# Patient Record
Sex: FEMALE | Race: WHITE | NOT HISPANIC OR LATINO | Employment: FULL TIME | URBAN - METROPOLITAN AREA
[De-identification: names, ages, dates, MRNs, and addresses within clinical notes are randomized per-mention and may not be internally consistent; named-entity substitution may affect disease eponyms.]

---

## 2020-11-11 ENCOUNTER — OFFICE VISIT (OUTPATIENT)
Dept: URGENT CARE | Facility: URGENT CARE | Age: 45
End: 2020-11-11
Payer: COMMERCIAL

## 2020-11-11 ENCOUNTER — ANCILLARY PROCEDURE (OUTPATIENT)
Dept: GENERAL RADIOLOGY | Facility: CLINIC | Age: 45
End: 2020-11-11
Attending: FAMILY MEDICINE
Payer: COMMERCIAL

## 2020-11-11 VITALS
TEMPERATURE: 100.8 F | SYSTOLIC BLOOD PRESSURE: 136 MMHG | HEART RATE: 121 BPM | DIASTOLIC BLOOD PRESSURE: 97 MMHG | OXYGEN SATURATION: 99 %

## 2020-11-11 DIAGNOSIS — Z20.822 SUSPECTED COVID-19 VIRUS INFECTION: ICD-10-CM

## 2020-11-11 DIAGNOSIS — R05.9 COUGH: ICD-10-CM

## 2020-11-11 DIAGNOSIS — R07.9 CHEST PAIN, UNSPECIFIED TYPE: ICD-10-CM

## 2020-11-11 DIAGNOSIS — R06.02 SOB (SHORTNESS OF BREATH): Primary | ICD-10-CM

## 2020-11-11 LAB
ALBUMIN SERPL-MCNC: 4 G/DL (ref 3.4–5)
ALP SERPL-CCNC: 68 U/L (ref 40–150)
ALT SERPL W P-5'-P-CCNC: 42 U/L (ref 0–50)
ANION GAP SERPL CALCULATED.3IONS-SCNC: 5 MMOL/L (ref 3–14)
AST SERPL W P-5'-P-CCNC: 94 U/L (ref 0–45)
BASOPHILS # BLD AUTO: 0 10E9/L (ref 0–0.2)
BASOPHILS NFR BLD AUTO: 0.3 %
BILIRUB SERPL-MCNC: 1 MG/DL (ref 0.2–1.3)
BUN SERPL-MCNC: 9 MG/DL (ref 7–30)
CALCIUM SERPL-MCNC: 8.8 MG/DL (ref 8.5–10.1)
CHLORIDE SERPL-SCNC: 103 MMOL/L (ref 94–109)
CO2 SERPL-SCNC: 26 MMOL/L (ref 20–32)
CREAT SERPL-MCNC: 0.8 MG/DL (ref 0.52–1.04)
DIFFERENTIAL METHOD BLD: ABNORMAL
EOSINOPHIL # BLD AUTO: 0.2 10E9/L (ref 0–0.7)
EOSINOPHIL NFR BLD AUTO: 1.6 %
ERYTHROCYTE [DISTWIDTH] IN BLOOD BY AUTOMATED COUNT: 13.2 % (ref 10–15)
ERYTHROCYTE [SEDIMENTATION RATE] IN BLOOD BY WESTERGREN METHOD: 5 MM/H (ref 0–20)
GFR SERPL CREATININE-BSD FRML MDRD: 89 ML/MIN/{1.73_M2}
GLUCOSE SERPL-MCNC: 97 MG/DL (ref 70–99)
HCT VFR BLD AUTO: 41.7 % (ref 35–47)
HGB BLD-MCNC: 14.4 G/DL (ref 11.7–15.7)
LYMPHOCYTES # BLD AUTO: 2.5 10E9/L (ref 0.8–5.3)
LYMPHOCYTES NFR BLD AUTO: 21.1 %
MCH RBC QN AUTO: 34.4 PG (ref 26.5–33)
MCHC RBC AUTO-ENTMCNC: 34.5 G/DL (ref 31.5–36.5)
MCV RBC AUTO: 100 FL (ref 78–100)
MONOCYTES # BLD AUTO: 1 10E9/L (ref 0–1.3)
MONOCYTES NFR BLD AUTO: 8.5 %
NEUTROPHILS # BLD AUTO: 8 10E9/L (ref 1.6–8.3)
NEUTROPHILS NFR BLD AUTO: 68.5 %
PLATELET # BLD AUTO: 203 10E9/L (ref 150–450)
POTASSIUM SERPL-SCNC: 4 MMOL/L (ref 3.4–5.3)
PROT SERPL-MCNC: 6.8 G/DL (ref 6.8–8.8)
RBC # BLD AUTO: 4.18 10E12/L (ref 3.8–5.2)
SODIUM SERPL-SCNC: 134 MMOL/L (ref 133–144)
WBC # BLD AUTO: 11.6 10E9/L (ref 4–11)

## 2020-11-11 PROCEDURE — 71046 X-RAY EXAM CHEST 2 VIEWS: CPT | Performed by: RADIOLOGY

## 2020-11-11 PROCEDURE — 85025 COMPLETE CBC W/AUTO DIFF WBC: CPT | Performed by: FAMILY MEDICINE

## 2020-11-11 PROCEDURE — U0003 INFECTIOUS AGENT DETECTION BY NUCLEIC ACID (DNA OR RNA); SEVERE ACUTE RESPIRATORY SYNDROME CORONAVIRUS 2 (SARS-COV-2) (CORONAVIRUS DISEASE [COVID-19]), AMPLIFIED PROBE TECHNIQUE, MAKING USE OF HIGH THROUGHPUT TECHNOLOGIES AS DESCRIBED BY CMS-2020-01-R: HCPCS | Performed by: FAMILY MEDICINE

## 2020-11-11 PROCEDURE — 93000 ELECTROCARDIOGRAM COMPLETE: CPT | Performed by: FAMILY MEDICINE

## 2020-11-11 PROCEDURE — 36415 COLL VENOUS BLD VENIPUNCTURE: CPT | Performed by: FAMILY MEDICINE

## 2020-11-11 PROCEDURE — 80053 COMPREHEN METABOLIC PANEL: CPT | Performed by: FAMILY MEDICINE

## 2020-11-11 PROCEDURE — 99203 OFFICE O/P NEW LOW 30 MIN: CPT | Performed by: FAMILY MEDICINE

## 2020-11-11 PROCEDURE — 85652 RBC SED RATE AUTOMATED: CPT | Performed by: FAMILY MEDICINE

## 2020-11-11 RX ORDER — ALPRAZOLAM 0.5 MG
0.5 TABLET ORAL 3 TIMES DAILY PRN
COMMUNITY
End: 2022-10-20

## 2020-11-11 RX ORDER — BENZONATATE 200 MG/1
200 CAPSULE ORAL 3 TIMES DAILY PRN
Qty: 30 CAPSULE | Refills: 0 | Status: SHIPPED | OUTPATIENT
Start: 2020-11-11 | End: 2022-08-15

## 2020-11-11 RX ORDER — ALBUTEROL SULFATE 90 UG/1
2 AEROSOL, METERED RESPIRATORY (INHALATION) EVERY 6 HOURS
Qty: 1 INHALER | Refills: 0 | Status: SHIPPED | OUTPATIENT
Start: 2020-11-11 | End: 2022-08-15

## 2020-11-11 RX ORDER — TOPIRAMATE SPINKLE 25 MG/1
25 CAPSULE ORAL 2 TIMES DAILY
COMMUNITY
End: 2022-08-15

## 2020-11-11 RX ORDER — BUPROPION HYDROCHLORIDE 75 MG/1
75 TABLET ORAL 2 TIMES DAILY
COMMUNITY
End: 2022-08-15

## 2020-11-11 NOTE — PROGRESS NOTES
SUBJECTIVE:   Mariya Núñez is a 45 year old female presenting with a chief complaint of chest pain, cough, SOB.    Initial symptoms was fatigue, started 2-3 days ago.  Endorsed positive exposure at work to COVID, thinks that was last week on Friday.  Wears her mask when out but clients does not always wear mask.    Today was taking client in to have COVID test done and patient started to feel more dizzy, cough and SOB.  Complain of having more chest pain anterior.  Appetite down.  Able to drink fluids.    No history of asthma.  Positive for TOB use.   May have had COVID infection back in March, test was obtained about 2 weeks after symptoms, result was negative.    No family history of asthma, CAD.  Father with melanoma    Medical history - depression/anxiety, bipolar 2,     No past medical history on file.  Current Outpatient Medications   Medication Sig Dispense Refill     ALPRAZolam (XANAX) 0.5 MG tablet Take 0.5 mg by mouth 3 times daily as needed for anxiety       buPROPion (WELLBUTRIN) 75 MG tablet Take 75 mg by mouth 2 times daily       topiramate (TOPAMAX) 25 MG capsule Take 25 mg by mouth 2 times daily       Social History     Tobacco Use     Smoking status: Current Every Day Smoker     Types: Cigarettes     Smokeless tobacco: Never Used   Substance Use Topics     Alcohol use: Not on file       ROS:  Review of systems negative except as stated above.    OBJECTIVE:  BP (!) 136/97 (BP Location: Right arm)   Pulse 121   Temp 100.8  F (38.2  C) (Tympanic)   SpO2 99%   Breastfeeding No   GENERAL APPEARANCE: healthy, alert, fatigue  EYES: EOMI,  PERRL, conjunctiva clear  HENT: ear canals and TM's normal.  Nose and mouth without ulcers, erythema or lesions  RESP: lungs clear to auscultation - no rales, rhonchi or wheezes  CV: tachycardic  Extremities: no peripheral edema or tenderness, peripheral pulses normal  PSYCH: mentation appears normal and anxious    CXR - no acute infiltrate, no pleural effusion, no  pneumothorax personally viewed by me    EKG - sinus tachy, rate 104, no PVC, no ST c/w ischemia    Results for orders placed or performed in visit on 11/11/20   XR Chest 2 Views     Status: None (Preliminary result)    Narrative    CHEST TWO VIEWS 11/11/2020 5:45 PM     HISTORY: Cough, chest pain, shortness of breath. SOB (shortness of  breath). Chest pain, unspecified type.    COMPARISON: None.       Impression    IMPRESSION: There are no acute infiltrates. The cardiac silhouette is  not enlarged. Pulmonary vasculature is unremarkable.   CBC with platelets and differential     Status: Abnormal   Result Value Ref Range    WBC 11.6 (H) 4.0 - 11.0 10e9/L    RBC Count 4.18 3.8 - 5.2 10e12/L    Hemoglobin 14.4 11.7 - 15.7 g/dL    Hematocrit 41.7 35.0 - 47.0 %     78 - 100 fl    MCH 34.4 (H) 26.5 - 33.0 pg    MCHC 34.5 31.5 - 36.5 g/dL    RDW 13.2 10.0 - 15.0 %    Platelet Count 203 150 - 450 10e9/L    Diff Method Automated Method     % Neutrophils 68.5 %    % Lymphocytes 21.1 %    % Monocytes 8.5 %    % Eosinophils 1.6 %    % Basophils 0.3 %    Absolute Neutrophil 8.0 1.6 - 8.3 10e9/L    Absolute Lymphocytes 2.5 0.8 - 5.3 10e9/L    Absolute Monocytes 1.0 0.0 - 1.3 10e9/L    Absolute Eosinophils 0.2 0.0 - 0.7 10e9/L    Absolute Basophils 0.0 0.0 - 0.2 10e9/L   Comprehensive metabolic panel     Status: Abnormal   Result Value Ref Range    Sodium 134 133 - 144 mmol/L    Potassium 4.0 3.4 - 5.3 mmol/L    Chloride 103 94 - 109 mmol/L    Carbon Dioxide 26 20 - 32 mmol/L    Anion Gap 5 3 - 14 mmol/L    Glucose 97 70 - 99 mg/dL    Urea Nitrogen 9 7 - 30 mg/dL    Creatinine 0.80 0.52 - 1.04 mg/dL    GFR Estimate 89 >60 mL/min/[1.73_m2]    GFR Estimate If Black 103 >60 mL/min/[1.73_m2]    Calcium 8.8 8.5 - 10.1 mg/dL    Bilirubin Total 1.0 0.2 - 1.3 mg/dL    Albumin 4.0 3.4 - 5.0 g/dL    Protein Total 6.8 6.8 - 8.8 g/dL    Alkaline Phosphatase 68 40 - 150 U/L    ALT 42 0 - 50 U/L    AST 94 (H) 0 - 45 U/L   ESR: Erythrocyte  sedimentation rate     Status: None   Result Value Ref Range    Sed Rate 5 0 - 20 mm/h       ASSESSMENT/PLAN:  (R06.02) SOB (shortness of breath)  (primary encounter diagnosis)  Plan: EKG 12-lead complete w/read - Clinics, XR Chest        2 Views, CBC with platelets and differential,         Comprehensive metabolic panel, ESR: Erythrocyte        sedimentation rate, albuterol (PROAIR         HFA/PROVENTIL HFA/VENTOLIN HFA) 108 (90 Base)         MCG/ACT inhaler, Symptomatic COVID-19 Virus         (Coronavirus) by PCR            (R07.9) Chest pain, unspecified type  Plan: EKG 12-lead complete w/read - Clinics, XR Chest        2 Views, CBC with platelets and differential,         Comprehensive metabolic panel, ESR: Erythrocyte        sedimentation rate            (Z20.828) Suspected COVID-19 virus infection  Plan: Symptomatic COVID-19 Virus (Coronavirus) by PCR            (R05) Cough  Plan: albuterol (PROAIR HFA/PROVENTIL HFA/VENTOLIN         HFA) 108 (90 Base) MCG/ACT inhaler, benzonatate        (TESSALON) 200 MG capsule            Reassurance given, reviewed symptomatic treatment with tylenol, ibuprofen, plenty of fluids and rest.  RX tessalon perles and RX albuterol inhaler given to help with symptoms.  Reviewed importance of hydration in setting of fever.  COVID screen obtained today and recommend to quarantine for at least 10 days from symptoms onset.    Work excuse note given  Follow up with primary provider if no improvement in 1 week    PPE - mask, face shield, gown, gloves    Ba Paul MD  November 11, 2020 7:00 PM

## 2020-11-11 NOTE — LETTER
November 11, 2020      Mariya Núñez  [address] [place] [address] [address]  [place] [address]        To Whom It May Concern:    Mariya Núñez  was seen on 11/11.  Please excuse her from work thru 11/19 due to illness, COVID-19 suspect.  She needs to quarantine for at least 10 days from symptom onset of 11/9.        Sincerely,        Ba Paul MD

## 2020-11-12 NOTE — PATIENT INSTRUCTIONS
Okay to take ibuprofen 200 mg - 4 tablets (800 mg) every 8 hours as needed.  Okay to take tylenol 500 mg - 2 tablets (1000 mg) every 6-8 hours as needed, do not exceed 3000 mg in 24 hours.  Okay to take tessalon perles to help with cough  Use albuterol inhaler to help with cough, shortness of breath and wheezing.      Patient Education     Understanding COVID-19 (Coronavirus Disease 2019)  COVID-19 is an illness of the lungs. It causes fever, coughing and trouble breathing. The illness is caused by a new virus in the coronavirus family called SARS-CoV-2.  First seen in late 2019, this virus has spread to many cities and countries around the world. Scientists are working to understand it better.   For the latest information, visit the CDC website at www.cdc.gov/coronavirus/2019-ncov. Or call 244-GIV-PXMJ ([phone]).   How does COVID-19 spread?  The virus seems to spread and infect people fairly easily. It may spread by:    Breathing in droplets of fluid that someone coughs or sneezes into the air.    Touching your eyes, nose or mouth after touching an infected surface. (The virus can live on handles, objects, and other surfaces.)  What are the symptoms of COVID-19?  Some people have no symptoms or only mild symptoms. Symptoms can vary from person to person. As experts learn more about COVID-19, new symptoms are being reported.  Symptoms may appear 2 to 14 days after contact with the virus. They include:    Fever or chills    Coughing    Trouble breathing or feeling short of breath    Sore throat    Stuffy or runny nose    Headache and body aches    Fatigue (feeling very tired)    Loss of appetite    Nausea or vomiting (feeling sick to your stomach or throwing up)    Diarrhea (loose, watery poop)    Abdominal (belly) pain    Loss of smell or taste  You can check your symptoms with the CDC s Coronavirus Self-.   What are possible problems from COVID-19?  In many cases, this virus can cause infection  (pneumonia) in both lungs. This can make a person very ill, and it can even cause death.  Your chances of severe illness are higher if:    You re an older adult    You have a serious health issue, such as heart or lung disease, diabetes or kidney disease    You have a health problem (or take a medicine) that suppresses the immune system  Rarely, some children develop a severe problem called MIS-C. This is similar to [place] disease, which causes blood vessels and body organs to be inflamed. We don t yet know if MIS-C happens only in children, or if adults are also at risk. We also don t know if it's related to COVID-19--many children with MIS-C test positive for the virus, but not all.  Experts continue to study MIS-C. The [facility] has asked hospitals to report any person under 21 who has all of the following:     A fever over 100.4 F (38.0 C) for more than 24 hours and either a positive COVID-19 test or exposure to the virus in the last 4 weeks    Inflammation in at least 2 organs such as the heart, lungs or kidneys, with lab tests that show inflammation    No other diagnoses besides COVID-19 explain the child's symptoms  How is COVID-19 diagnosed?  Your care team will ask about your symptoms, recent travel and contact with sick people.  Not everyone will be tested for the virus. If you need to be tested, we will use a cotton swab to take a sample of cells from your nose and throat.  You may have other tests as well, such as:    Antibody (blood test).  This test may show if you ve had the virus in the past--it won t tell us if you have it right now. (It takes a few weeks for the antibodies to show up in your blood). If you ve had the virus, you may have immunity (protection from the virus) in the future. We don t know yet how long you would be immune. Antibody tests aren t always accurate.    Sputum test (we may collect mucus that you have coughed up from your lungs).    Chest X-ray or CT scan.  Note about immunity  We  don t yet know if people can catch COVID-19 more than once. If a person who has fully recovered is re-tested within 3 months, the test may still show low levels of the virus in their body. (In other words, the test may show that they still have COVID-19, even though they aren t spreading it to others.)  This doesn't mean they can't catch COVID-19 again. They may be protected from the virus for a time, but we don t know how long immunity might last.  How is COVID-19 treated?  At this time, there is no medicine to prevent or treat COVID-19. Experts are testing different medicines, trying to find one that works.  So far, the most proven treatment is to support your body while it fights the virus.    Getting extra rest.    Drink extra fluids (6 to 8 glasses of liquids each day), unless a doctor has told you not to. Ask your care team which fluids are best for you. Avoid fluids that contain caffeine or alcohol.    Take over-the-counter (OTC) pain medicine to reduce pain and fever. Your care team will tell you which medicine to use.  If you are very sick, you may need to stay in the hospital.    We may give you fluids through a vein to keep you hydrated (IV fluids).    To make sure your body gets enough oxygen, we may give you an oxygen mask or a breathing machine (ventilator).    We may turn you onto your stomach (called  prone positioning ). This will increase the oxygen in your lungs.  Those who have recovered from COVID-19 may be asked to donate plasma. (This is called COVID-19 convalescent plasma donation.) The plasma may contain antibodies to help fight the virus in others. Scientists are testing whether this might be a treatment in the future. For more information, talk to your care team.  Are you at risk for COVID-19?  Some studies suggest that people without symptoms may spread COVID-19.  You re at risk for getting COVID-19 if:    You live in (or recently traveled to) an area with a COVID-19 outbreak.    You had  close contact with someone who has or may have COVID-19. Close contact means being within 6 feet, living in the same house, or visiting.  Date last modified: 10/13/2020  Ann last reviewed this educational content on 1/1/2020  This information has been modified by your health care provider with permission from the publisher.    6591-2343 [employer]. [address] [place] [address]. All rights reserved. This information is not intended as a substitute for professional medical care. Always follow your healthcare professional's instructions.

## 2020-11-12 NOTE — NURSING NOTE
I was in the room with this patient for more than 15 mins. Pt was ordered and EKG and other various task.    KATIUSKA Webb

## 2020-11-13 LAB
SARS-COV-2 RNA SPEC QL NAA+PROBE: NOT DETECTED
SPECIMEN SOURCE: NORMAL

## 2022-08-15 ENCOUNTER — HOSPITAL ENCOUNTER (INPATIENT)
Facility: CLINIC | Age: 47
LOS: 1 days | Discharge: HOME | End: 2022-08-16
Attending: EMERGENCY MEDICINE | Admitting: HOSPITALIST
Payer: COMMERCIAL

## 2022-08-15 ENCOUNTER — APPOINTMENT (OUTPATIENT)
Dept: CT IMAGING | Facility: CLINIC | Age: 47
End: 2022-08-15
Attending: EMERGENCY MEDICINE
Payer: COMMERCIAL

## 2022-08-15 DIAGNOSIS — F10.930 ALCOHOL WITHDRAWAL SYNDROME WITHOUT COMPLICATION (H): ICD-10-CM

## 2022-08-15 DIAGNOSIS — R11.2 NAUSEA AND VOMITING, INTRACTABILITY OF VOMITING NOT SPECIFIED, UNSPECIFIED VOMITING TYPE: Primary | ICD-10-CM

## 2022-08-15 DIAGNOSIS — E87.6 HYPOKALEMIA: ICD-10-CM

## 2022-08-15 DIAGNOSIS — K92.0 HEMATEMESIS, PRESENCE OF NAUSEA NOT SPECIFIED: ICD-10-CM

## 2022-08-15 DIAGNOSIS — E83.42 HYPOMAGNESEMIA: ICD-10-CM

## 2022-08-15 LAB
ABO/RH(D): NORMAL
ALBUMIN SERPL BCG-MCNC: 4.7 G/DL (ref 3.5–5.2)
ALBUMIN UR-MCNC: 200 MG/DL
ALP SERPL-CCNC: 89 U/L (ref 35–104)
ALT SERPL W P-5'-P-CCNC: 29 U/L (ref 10–35)
ANION GAP SERPL CALCULATED.3IONS-SCNC: 19 MMOL/L (ref 7–15)
ANTIBODY SCREEN: NEGATIVE
APPEARANCE UR: ABNORMAL
APTT PPP: 25 SECONDS (ref 22–38)
AST SERPL W P-5'-P-CCNC: 117 U/L (ref 10–35)
BACTERIA #/AREA URNS HPF: ABNORMAL /HPF
BASOPHILS # BLD AUTO: 0.1 10E3/UL (ref 0–0.2)
BASOPHILS NFR BLD AUTO: 1 %
BILIRUB SERPL-MCNC: 1.5 MG/DL
BILIRUB UR QL STRIP: ABNORMAL
BUN SERPL-MCNC: 13.3 MG/DL (ref 6–20)
CALCIUM SERPL-MCNC: 10.1 MG/DL (ref 8.6–10)
CHLORIDE SERPL-SCNC: 94 MMOL/L (ref 98–107)
COLOR UR AUTO: ABNORMAL
CREAT SERPL-MCNC: 0.7 MG/DL (ref 0.51–0.95)
DEPRECATED HCO3 PLAS-SCNC: 24 MMOL/L (ref 22–29)
EOSINOPHIL # BLD AUTO: 0.1 10E3/UL (ref 0–0.7)
EOSINOPHIL NFR BLD AUTO: 1 %
ERYTHROCYTE [DISTWIDTH] IN BLOOD BY AUTOMATED COUNT: 12.8 % (ref 10–15)
ETHANOL SERPL-MCNC: <0.01 G/DL
GFR SERPL CREATININE-BSD FRML MDRD: >90 ML/MIN/1.73M2
GLUCOSE SERPL-MCNC: 147 MG/DL (ref 70–99)
GLUCOSE UR STRIP-MCNC: 50 MG/DL
HCT VFR BLD AUTO: 47.3 % (ref 35–47)
HGB BLD-MCNC: 13 G/DL (ref 11.7–15.7)
HGB BLD-MCNC: 13.8 G/DL (ref 11.7–15.7)
HGB BLD-MCNC: 15.8 G/DL (ref 11.7–15.7)
HGB UR QL STRIP: ABNORMAL
HYALINE CASTS: 130 /LPF
IMM GRANULOCYTES # BLD: 0 10E3/UL
IMM GRANULOCYTES NFR BLD: 0 %
INR PPP: 0.95 (ref 0.85–1.15)
KETONES UR STRIP-MCNC: 60 MG/DL
LACTATE SERPL-SCNC: 1 MMOL/L (ref 0.7–2)
LEUKOCYTE ESTERASE UR QL STRIP: ABNORMAL
LIPASE SERPL-CCNC: 31 U/L (ref 13–60)
LYMPHOCYTES # BLD AUTO: 1.6 10E3/UL (ref 0.8–5.3)
LYMPHOCYTES NFR BLD AUTO: 17 %
MAGNESIUM SERPL-MCNC: 1.4 MG/DL (ref 1.7–2.3)
MCH RBC QN AUTO: 33 PG (ref 26.5–33)
MCHC RBC AUTO-ENTMCNC: 33.4 G/DL (ref 31.5–36.5)
MCV RBC AUTO: 99 FL (ref 78–100)
MONOCYTES # BLD AUTO: 0.6 10E3/UL (ref 0–1.3)
MONOCYTES NFR BLD AUTO: 7 %
MUCOUS THREADS #/AREA URNS LPF: PRESENT /LPF
NEUTROPHILS # BLD AUTO: 6.9 10E3/UL (ref 1.6–8.3)
NEUTROPHILS NFR BLD AUTO: 74 %
NITRATE UR QL: NEGATIVE
NRBC # BLD AUTO: 0 10E3/UL
NRBC BLD AUTO-RTO: 0 /100
PH UR STRIP: 6 [PH] (ref 5–7)
PHOSPHATE SERPL-MCNC: 2.9 MG/DL (ref 2.5–4.5)
PLATELET # BLD AUTO: 195 10E3/UL (ref 150–450)
POTASSIUM SERPL-SCNC: 3.3 MMOL/L (ref 3.4–5.3)
POTASSIUM SERPL-SCNC: 3.7 MMOL/L (ref 3.4–5.3)
PROT SERPL-MCNC: 7.5 G/DL (ref 6.4–8.3)
RBC # BLD AUTO: 4.79 10E6/UL (ref 3.8–5.2)
RBC URINE: 40 /HPF
SARS-COV-2 RNA RESP QL NAA+PROBE: NEGATIVE
SODIUM SERPL-SCNC: 137 MMOL/L (ref 136–145)
SP GR UR STRIP: 1.03 (ref 1–1.03)
SPECIMEN EXPIRATION DATE: NORMAL
SQUAMOUS EPITHELIAL: 25 /HPF
TRANSITIONAL EPI: 1 /HPF
UROBILINOGEN UR STRIP-MCNC: 6 MG/DL
WBC # BLD AUTO: 9.3 10E3/UL (ref 4–11)
WBC URINE: 26 /HPF

## 2022-08-15 PROCEDURE — 258N000003 HC RX IP 258 OP 636: Performed by: PHYSICIAN ASSISTANT

## 2022-08-15 PROCEDURE — 82077 ASSAY SPEC XCP UR&BREATH IA: CPT | Performed by: EMERGENCY MEDICINE

## 2022-08-15 PROCEDURE — 81001 URINALYSIS AUTO W/SCOPE: CPT | Performed by: EMERGENCY MEDICINE

## 2022-08-15 PROCEDURE — 86850 RBC ANTIBODY SCREEN: CPT | Performed by: EMERGENCY MEDICINE

## 2022-08-15 PROCEDURE — 36415 COLL VENOUS BLD VENIPUNCTURE: CPT | Performed by: EMERGENCY MEDICINE

## 2022-08-15 PROCEDURE — 96365 THER/PROPH/DIAG IV INF INIT: CPT | Mod: 59

## 2022-08-15 PROCEDURE — C9113 INJ PANTOPRAZOLE SODIUM, VIA: HCPCS | Performed by: EMERGENCY MEDICINE

## 2022-08-15 PROCEDURE — 250N000011 HC RX IP 250 OP 636: Performed by: EMERGENCY MEDICINE

## 2022-08-15 PROCEDURE — 96376 TX/PRO/DX INJ SAME DRUG ADON: CPT

## 2022-08-15 PROCEDURE — 250N000013 HC RX MED GY IP 250 OP 250 PS 637: Performed by: PHYSICIAN ASSISTANT

## 2022-08-15 PROCEDURE — 82040 ASSAY OF SERUM ALBUMIN: CPT | Performed by: EMERGENCY MEDICINE

## 2022-08-15 PROCEDURE — 99285 EMERGENCY DEPT VISIT HI MDM: CPT | Mod: 25

## 2022-08-15 PROCEDURE — 99223 1ST HOSP IP/OBS HIGH 75: CPT | Mod: AI | Performed by: HOSPITALIST

## 2022-08-15 PROCEDURE — 36415 COLL VENOUS BLD VENIPUNCTURE: CPT | Performed by: HOSPITALIST

## 2022-08-15 PROCEDURE — 80053 COMPREHEN METABOLIC PANEL: CPT | Performed by: EMERGENCY MEDICINE

## 2022-08-15 PROCEDURE — 83690 ASSAY OF LIPASE: CPT | Performed by: EMERGENCY MEDICINE

## 2022-08-15 PROCEDURE — 84132 ASSAY OF SERUM POTASSIUM: CPT | Performed by: HOSPITALIST

## 2022-08-15 PROCEDURE — 84100 ASSAY OF PHOSPHORUS: CPT | Performed by: EMERGENCY MEDICINE

## 2022-08-15 PROCEDURE — C9803 HOPD COVID-19 SPEC COLLECT: HCPCS

## 2022-08-15 PROCEDURE — U0003 INFECTIOUS AGENT DETECTION BY NUCLEIC ACID (DNA OR RNA); SEVERE ACUTE RESPIRATORY SYNDROME CORONAVIRUS 2 (SARS-COV-2) (CORONAVIRUS DISEASE [COVID-19]), AMPLIFIED PROBE TECHNIQUE, MAKING USE OF HIGH THROUGHPUT TECHNOLOGIES AS DESCRIBED BY CMS-2020-01-R: HCPCS | Performed by: EMERGENCY MEDICINE

## 2022-08-15 PROCEDURE — 250N000013 HC RX MED GY IP 250 OP 250 PS 637: Performed by: HOSPITALIST

## 2022-08-15 PROCEDURE — 85610 PROTHROMBIN TIME: CPT | Performed by: EMERGENCY MEDICINE

## 2022-08-15 PROCEDURE — 250N000009 HC RX 250: Performed by: EMERGENCY MEDICINE

## 2022-08-15 PROCEDURE — 96375 TX/PRO/DX INJ NEW DRUG ADDON: CPT

## 2022-08-15 PROCEDURE — 258N000003 HC RX IP 258 OP 636: Performed by: EMERGENCY MEDICINE

## 2022-08-15 PROCEDURE — 83735 ASSAY OF MAGNESIUM: CPT | Performed by: EMERGENCY MEDICINE

## 2022-08-15 PROCEDURE — 250N000013 HC RX MED GY IP 250 OP 250 PS 637: Performed by: EMERGENCY MEDICINE

## 2022-08-15 PROCEDURE — 85025 COMPLETE CBC W/AUTO DIFF WBC: CPT | Performed by: EMERGENCY MEDICINE

## 2022-08-15 PROCEDURE — 85730 THROMBOPLASTIN TIME PARTIAL: CPT | Performed by: EMERGENCY MEDICINE

## 2022-08-15 PROCEDURE — 74177 CT ABD & PELVIS W/CONTRAST: CPT

## 2022-08-15 PROCEDURE — 120N000001 HC R&B MED SURG/OB

## 2022-08-15 PROCEDURE — 250N000011 HC RX IP 250 OP 636: Performed by: PHYSICIAN ASSISTANT

## 2022-08-15 PROCEDURE — 85018 HEMOGLOBIN: CPT | Performed by: HOSPITALIST

## 2022-08-15 PROCEDURE — C9113 INJ PANTOPRAZOLE SODIUM, VIA: HCPCS | Performed by: PHYSICIAN ASSISTANT

## 2022-08-15 PROCEDURE — 96367 TX/PROPH/DG ADDL SEQ IV INF: CPT

## 2022-08-15 PROCEDURE — 87086 URINE CULTURE/COLONY COUNT: CPT | Performed by: EMERGENCY MEDICINE

## 2022-08-15 PROCEDURE — 83605 ASSAY OF LACTIC ACID: CPT | Performed by: EMERGENCY MEDICINE

## 2022-08-15 RX ORDER — ONDANSETRON 2 MG/ML
4 INJECTION INTRAMUSCULAR; INTRAVENOUS ONCE
Status: COMPLETED | OUTPATIENT
Start: 2022-08-15 | End: 2022-08-15

## 2022-08-15 RX ORDER — GABAPENTIN 100 MG/1
100 CAPSULE ORAL EVERY 8 HOURS
Status: DISCONTINUED | OUTPATIENT
Start: 2022-08-22 | End: 2022-08-16 | Stop reason: HOSPADM

## 2022-08-15 RX ORDER — GABAPENTIN 600 MG/1
1200 TABLET ORAL ONCE
Status: DISCONTINUED | OUTPATIENT
Start: 2022-08-15 | End: 2022-08-15

## 2022-08-15 RX ORDER — BUPROPION HYDROCHLORIDE 300 MG/1
300 TABLET ORAL EVERY MORNING
COMMUNITY

## 2022-08-15 RX ORDER — GABAPENTIN 300 MG/1
900 CAPSULE ORAL EVERY 8 HOURS
Status: DISCONTINUED | OUTPATIENT
Start: 2022-08-15 | End: 2022-08-15

## 2022-08-15 RX ORDER — FLUMAZENIL 0.1 MG/ML
0.2 INJECTION, SOLUTION INTRAVENOUS
Status: DISCONTINUED | OUTPATIENT
Start: 2022-08-15 | End: 2022-08-16 | Stop reason: HOSPADM

## 2022-08-15 RX ORDER — AMOXICILLIN 250 MG
1 CAPSULE ORAL 2 TIMES DAILY PRN
Status: DISCONTINUED | OUTPATIENT
Start: 2022-08-15 | End: 2022-08-16 | Stop reason: HOSPADM

## 2022-08-15 RX ORDER — HALOPERIDOL 5 MG/ML
2.5-5 INJECTION INTRAMUSCULAR EVERY 6 HOURS PRN
Status: DISCONTINUED | OUTPATIENT
Start: 2022-08-15 | End: 2022-08-15

## 2022-08-15 RX ORDER — SODIUM CHLORIDE 9 MG/ML
INJECTION, SOLUTION INTRAVENOUS CONTINUOUS
Status: DISCONTINUED | OUTPATIENT
Start: 2022-08-15 | End: 2022-08-16

## 2022-08-15 RX ORDER — MAGNESIUM SULFATE HEPTAHYDRATE 40 MG/ML
2 INJECTION, SOLUTION INTRAVENOUS ONCE
Status: COMPLETED | OUTPATIENT
Start: 2022-08-15 | End: 2022-08-15

## 2022-08-15 RX ORDER — FOLIC ACID 1 MG/1
1 TABLET ORAL ONCE
Status: COMPLETED | OUTPATIENT
Start: 2022-08-15 | End: 2022-08-15

## 2022-08-15 RX ORDER — GABAPENTIN 100 MG/1
100 CAPSULE ORAL EVERY 8 HOURS
Status: DISCONTINUED | OUTPATIENT
Start: 2022-08-22 | End: 2022-08-15

## 2022-08-15 RX ORDER — ONDANSETRON 4 MG/1
4 TABLET, ORALLY DISINTEGRATING ORAL EVERY 6 HOURS PRN
Status: DISCONTINUED | OUTPATIENT
Start: 2022-08-15 | End: 2022-08-16 | Stop reason: HOSPADM

## 2022-08-15 RX ORDER — DIAZEPAM 10 MG
10 TABLET ORAL EVERY 30 MIN PRN
Status: DISCONTINUED | OUTPATIENT
Start: 2022-08-15 | End: 2022-08-16 | Stop reason: HOSPADM

## 2022-08-15 RX ORDER — GABAPENTIN 300 MG/1
600 CAPSULE ORAL EVERY 8 HOURS
Status: DISCONTINUED | OUTPATIENT
Start: 2022-08-18 | End: 2022-08-15

## 2022-08-15 RX ORDER — OLANZAPINE 5 MG/1
5-10 TABLET, ORALLY DISINTEGRATING ORAL EVERY 6 HOURS PRN
Status: DISCONTINUED | OUTPATIENT
Start: 2022-08-15 | End: 2022-08-15

## 2022-08-15 RX ORDER — IOPAMIDOL 755 MG/ML
500 INJECTION, SOLUTION INTRAVASCULAR ONCE
Status: COMPLETED | OUTPATIENT
Start: 2022-08-15 | End: 2022-08-15

## 2022-08-15 RX ORDER — GABAPENTIN 100 MG/1
300 CAPSULE ORAL EVERY 8 HOURS
Status: DISCONTINUED | OUTPATIENT
Start: 2022-08-20 | End: 2022-08-15

## 2022-08-15 RX ORDER — FLUMAZENIL 0.1 MG/ML
0.2 INJECTION, SOLUTION INTRAVENOUS
Status: DISCONTINUED | OUTPATIENT
Start: 2022-08-15 | End: 2022-08-15

## 2022-08-15 RX ORDER — GABAPENTIN 600 MG/1
1200 TABLET ORAL ONCE
Status: COMPLETED | OUTPATIENT
Start: 2022-08-15 | End: 2022-08-15

## 2022-08-15 RX ORDER — DIAZEPAM 10 MG/2ML
5-10 INJECTION, SOLUTION INTRAMUSCULAR; INTRAVENOUS EVERY 30 MIN PRN
Status: DISCONTINUED | OUTPATIENT
Start: 2022-08-15 | End: 2022-08-16 | Stop reason: HOSPADM

## 2022-08-15 RX ORDER — TRAZODONE HYDROCHLORIDE 150 MG/1
75-150 TABLET ORAL
COMMUNITY
End: 2024-05-02

## 2022-08-15 RX ORDER — FOLIC ACID 1 MG/1
1 TABLET ORAL DAILY
Status: DISCONTINUED | OUTPATIENT
Start: 2022-08-16 | End: 2022-08-16 | Stop reason: HOSPADM

## 2022-08-15 RX ORDER — MULTIPLE VITAMINS W/ MINERALS TAB 9MG-400MCG
1 TAB ORAL DAILY
Status: DISCONTINUED | OUTPATIENT
Start: 2022-08-16 | End: 2022-08-16 | Stop reason: HOSPADM

## 2022-08-15 RX ORDER — ONDANSETRON 2 MG/ML
4 INJECTION INTRAMUSCULAR; INTRAVENOUS EVERY 6 HOURS PRN
Status: DISCONTINUED | OUTPATIENT
Start: 2022-08-15 | End: 2022-08-16 | Stop reason: HOSPADM

## 2022-08-15 RX ORDER — GABAPENTIN 300 MG/1
600 CAPSULE ORAL EVERY 8 HOURS
Status: DISCONTINUED | OUTPATIENT
Start: 2022-08-18 | End: 2022-08-16 | Stop reason: HOSPADM

## 2022-08-15 RX ORDER — POTASSIUM CHLORIDE 1500 MG/1
40 TABLET, EXTENDED RELEASE ORAL ONCE
Status: COMPLETED | OUTPATIENT
Start: 2022-08-15 | End: 2022-08-15

## 2022-08-15 RX ORDER — OLANZAPINE 5 MG/1
5-10 TABLET, ORALLY DISINTEGRATING ORAL EVERY 6 HOURS PRN
Status: DISCONTINUED | OUTPATIENT
Start: 2022-08-15 | End: 2022-08-16 | Stop reason: HOSPADM

## 2022-08-15 RX ORDER — BUPROPION HYDROCHLORIDE 150 MG/1
300 TABLET ORAL EVERY MORNING
Status: DISCONTINUED | OUTPATIENT
Start: 2022-08-15 | End: 2022-08-16 | Stop reason: HOSPADM

## 2022-08-15 RX ORDER — DIAZEPAM 10 MG/2ML
5 INJECTION, SOLUTION INTRAMUSCULAR; INTRAVENOUS ONCE
Status: COMPLETED | OUTPATIENT
Start: 2022-08-15 | End: 2022-08-15

## 2022-08-15 RX ORDER — GABAPENTIN 300 MG/1
900 CAPSULE ORAL EVERY 8 HOURS
Status: DISCONTINUED | OUTPATIENT
Start: 2022-08-15 | End: 2022-08-16 | Stop reason: HOSPADM

## 2022-08-15 RX ORDER — DIAZEPAM 10 MG/2ML
10 INJECTION, SOLUTION INTRAMUSCULAR; INTRAVENOUS ONCE
Status: COMPLETED | OUTPATIENT
Start: 2022-08-15 | End: 2022-08-15

## 2022-08-15 RX ORDER — DIAZEPAM 10 MG
10 TABLET ORAL EVERY 30 MIN PRN
Status: DISCONTINUED | OUTPATIENT
Start: 2022-08-15 | End: 2022-08-15

## 2022-08-15 RX ORDER — LIDOCAINE 40 MG/G
CREAM TOPICAL
Status: DISCONTINUED | OUTPATIENT
Start: 2022-08-15 | End: 2022-08-16 | Stop reason: HOSPADM

## 2022-08-15 RX ORDER — DIAZEPAM 10 MG/2ML
5-10 INJECTION, SOLUTION INTRAMUSCULAR; INTRAVENOUS EVERY 30 MIN PRN
Status: DISCONTINUED | OUTPATIENT
Start: 2022-08-15 | End: 2022-08-15

## 2022-08-15 RX ORDER — TOPIRAMATE 100 MG/1
150 TABLET, FILM COATED ORAL AT BEDTIME
COMMUNITY

## 2022-08-15 RX ORDER — POTASSIUM CHLORIDE 7.45 MG/ML
10 INJECTION INTRAVENOUS ONCE
Status: COMPLETED | OUTPATIENT
Start: 2022-08-15 | End: 2022-08-15

## 2022-08-15 RX ORDER — GABAPENTIN 300 MG/1
300 CAPSULE ORAL EVERY 8 HOURS
Status: DISCONTINUED | OUTPATIENT
Start: 2022-08-20 | End: 2022-08-16 | Stop reason: HOSPADM

## 2022-08-15 RX ORDER — PROCHLORPERAZINE MALEATE 10 MG
10 TABLET ORAL EVERY 6 HOURS PRN
Status: DISCONTINUED | OUTPATIENT
Start: 2022-08-15 | End: 2022-08-16 | Stop reason: HOSPADM

## 2022-08-15 RX ORDER — PROCHLORPERAZINE 25 MG
25 SUPPOSITORY, RECTAL RECTAL EVERY 12 HOURS PRN
Status: DISCONTINUED | OUTPATIENT
Start: 2022-08-15 | End: 2022-08-16 | Stop reason: HOSPADM

## 2022-08-15 RX ORDER — MULTIPLE VITAMINS W/ MINERALS TAB 9MG-400MCG
1 TAB ORAL ONCE
Status: COMPLETED | OUTPATIENT
Start: 2022-08-15 | End: 2022-08-15

## 2022-08-15 RX ORDER — HALOPERIDOL 5 MG/ML
2.5-5 INJECTION INTRAMUSCULAR EVERY 6 HOURS PRN
Status: DISCONTINUED | OUTPATIENT
Start: 2022-08-15 | End: 2022-08-16 | Stop reason: HOSPADM

## 2022-08-15 RX ORDER — THIAMINE HYDROCHLORIDE 100 MG/ML
100 INJECTION, SOLUTION INTRAMUSCULAR; INTRAVENOUS ONCE
Status: COMPLETED | OUTPATIENT
Start: 2022-08-15 | End: 2022-08-15

## 2022-08-15 RX ORDER — AMOXICILLIN 250 MG
2 CAPSULE ORAL 2 TIMES DAILY PRN
Status: DISCONTINUED | OUTPATIENT
Start: 2022-08-15 | End: 2022-08-16 | Stop reason: HOSPADM

## 2022-08-15 RX ADMIN — Medication 1 MG: at 12:10

## 2022-08-15 RX ADMIN — SODIUM CHLORIDE: 9 INJECTION, SOLUTION INTRAVENOUS at 22:43

## 2022-08-15 RX ADMIN — POTASSIUM CHLORIDE 40 MEQ: 1500 TABLET, EXTENDED RELEASE ORAL at 18:07

## 2022-08-15 RX ADMIN — DIAZEPAM 10 MG: 5 INJECTION, SOLUTION INTRAMUSCULAR; INTRAVENOUS at 09:33

## 2022-08-15 RX ADMIN — POTASSIUM CHLORIDE 10 MEQ: 7.46 INJECTION, SOLUTION INTRAVENOUS at 09:34

## 2022-08-15 RX ADMIN — TOPIRAMATE 150 MG: 100 TABLET, FILM COATED ORAL at 22:42

## 2022-08-15 RX ADMIN — THIAMINE HYDROCHLORIDE 100 MG: 100 INJECTION, SOLUTION INTRAMUSCULAR; INTRAVENOUS at 12:11

## 2022-08-15 RX ADMIN — GABAPENTIN 1200 MG: 600 TABLET, FILM COATED ORAL at 12:10

## 2022-08-15 RX ADMIN — SODIUM CHLORIDE, POTASSIUM CHLORIDE, SODIUM LACTATE AND CALCIUM CHLORIDE 1000 ML: 600; 310; 30; 20 INJECTION, SOLUTION INTRAVENOUS at 12:11

## 2022-08-15 RX ADMIN — SODIUM CHLORIDE 59 ML: 9 INJECTION, SOLUTION INTRAVENOUS at 10:19

## 2022-08-15 RX ADMIN — PANTOPRAZOLE SODIUM 80 MG: 40 INJECTION, POWDER, FOR SOLUTION INTRAVENOUS at 08:10

## 2022-08-15 RX ADMIN — SODIUM CHLORIDE: 9 INJECTION, SOLUTION INTRAVENOUS at 14:39

## 2022-08-15 RX ADMIN — TRAZODONE HYDROCHLORIDE 150 MG: 100 TABLET ORAL at 22:42

## 2022-08-15 RX ADMIN — SERTRALINE HYDROCHLORIDE 50 MG: 50 TABLET ORAL at 14:39

## 2022-08-15 RX ADMIN — DIAZEPAM 5 MG: 5 INJECTION, SOLUTION INTRAMUSCULAR; INTRAVENOUS at 08:46

## 2022-08-15 RX ADMIN — MAGNESIUM SULFATE HEPTAHYDRATE 2 G: 40 INJECTION, SOLUTION INTRAVENOUS at 08:46

## 2022-08-15 RX ADMIN — GABAPENTIN 900 MG: 300 CAPSULE ORAL at 21:10

## 2022-08-15 RX ADMIN — BUPROPION HYDROCHLORIDE 300 MG: 150 TABLET, FILM COATED, EXTENDED RELEASE ORAL at 14:39

## 2022-08-15 RX ADMIN — PANTOPRAZOLE SODIUM 40 MG: 40 INJECTION, POWDER, FOR SOLUTION INTRAVENOUS at 21:13

## 2022-08-15 RX ADMIN — IOPAMIDOL 71 ML: 755 INJECTION, SOLUTION INTRAVENOUS at 10:19

## 2022-08-15 RX ADMIN — MULTIPLE VITAMINS W/ MINERALS TAB 1 TABLET: TAB at 12:10

## 2022-08-15 RX ADMIN — SODIUM CHLORIDE 1000 ML: 9 INJECTION, SOLUTION INTRAVENOUS at 08:19

## 2022-08-15 RX ADMIN — THIAMINE HCL TAB 100 MG 100 MG: 100 TAB at 12:10

## 2022-08-15 RX ADMIN — ONDANSETRON 4 MG: 2 INJECTION INTRAMUSCULAR; INTRAVENOUS at 08:11

## 2022-08-15 ASSESSMENT — ACTIVITIES OF DAILY LIVING (ADL)
ADLS_ACUITY_SCORE: 35
ADLS_ACUITY_SCORE: 22
ADLS_ACUITY_SCORE: 22
ADLS_ACUITY_SCORE: 35
ADLS_ACUITY_SCORE: 22
ADLS_ACUITY_SCORE: 35

## 2022-08-15 ASSESSMENT — ENCOUNTER SYMPTOMS
TREMORS: 1
ABDOMINAL PAIN: 1
DIARRHEA: 0
FEVER: 0
VOMITING: 1
BLOOD IN STOOL: 0

## 2022-08-15 NOTE — ED PROVIDER NOTES
"  History   Chief Complaint:  Hematemesis     The history is provided by the patient.      Mariya Núñez is a 47 year old female with history of alcohol abuse, bulimia who presents with hematemesis with associated intermittent central abdominal pain, loose stool, and decreased urine output (only 2 times in last 24-36 hours). Patient states she began vomiting 36 hours ago and while initially non-bloody, she noted mild hematemesis as vomiting progressed. Patients states she is \"always shaky.\" Patient denies fever, diarrhea, bloody or tarry stools. Patients states she has hypertension along with anxiety, for which she manages with sertraline, citalopram, and trazodone. Patient denies any abdominal surgeries. Patient reports no recent drug or alcohol use.    Review of Systems   Constitutional: Negative for fever.   Gastrointestinal: Positive for abdominal pain (central) and vomiting (w blood). Negative for blood in stool and diarrhea.        + Loose stool   Genitourinary: Positive for decreased urine volume.   Neurological: Positive for tremors (at baseline).   All other systems reviewed and are negative.    Allergies:  The patient has no known allergies.     Medications:  Albuterol  Alprazolam  Benzonatate  Bupropion  Trazodone  Topiramate  Sertraline  Cetrizine  Lurasidone  Omeprazole    Past Medical History:     Uterine polyp  Depressive disorder  Anxiety  Alcohol abuse  Alcohol withdrawal hallucinosis  Adjustment disorder with mixed anxiety and depressed mood  Acute alcoholic hepatitis  Bulimia and anorexia  ADD  Bipolar 2 disorder     Past Surgical History:    Operative hysteroscopy  Right elbow repair  Polypectomy  [place] teeth extraction     Family History:    Father- hypertension  Mother- hyperlipidemia, thyroid disease    Social History:  PCP: No Ref-Primary, Physician   Patient presents with mother  Patient enters by car  Alcohol abuse    Physical Exam     Patient Vitals for the past 24 hrs:   BP Temp Temp " src Pulse Resp SpO2   08/15/22 1000 104/83 -- -- 108 -- 95 %   08/15/22 0930 (!) 134/103 -- -- 98 -- 97 %   08/15/22 0915 (!) 126/97 -- -- 99 -- 97 %   08/15/22 0901 (!) 121/95 -- -- 109 -- --   08/15/22 0859 (!) 134/99 -- -- 100 -- 95 %   08/15/22 0822 (!) 130/99 -- -- -- 18 95 %   08/15/22 0805 -- -- -- 103 -- 94 %   08/15/22 0740 (!) 151/107 96.8  F (36  C) Temporal (!) 129 18 94 %     Physical Exam  Constitutional: Alert, attentive, GCS 15   HENT:    Mouth/Throat: Dry mucosa, no tongue fasciculations  Eyes: EOM are normal, anicteric, conjugate gaze  CV: distal extremities warm, well perfused, tachycardic  Chest: Non-labored breathing on RA  GI:  No distension. No guarding or rebound. Centralized upper abdominal tenderness.  Neurological: Alert, attentive, moving all extremities equally. Tremulous.  Skin: Skin is warm and dry.    Emergency Department Course     Imaging:  Abd/pelvis CT,  IV  contrast only TRAUMA / AAA   Final Result   IMPRESSION:    1.  Marked fatty infiltration of the liver compatible with history.   Steatohepatitis not excluded.   2.  Otherwise no acute process demonstrated.       JEFF SOLER MD            SYSTEM ID:  [id number]        Report per radiology    Laboratory:  Labs Ordered and Resulted from Time of ED Arrival to Time of ED Departure   COMPREHENSIVE METABOLIC PANEL - Abnormal       Result Value    Sodium 137      Potassium 3.3 (*)     Creatinine 0.70      Urea Nitrogen 13.3      Chloride 94 (*)     Carbon Dioxide (CO2) 24      Anion Gap 19 (*)     Glucose 147 (*)     Calcium 10.1 (*)     Protein Total 7.5      Albumin 4.7      Bilirubin Total 1.5 (*)     Alkaline Phosphatase 89       (*)     ALT 29      GFR Estimate >90     ETHYL ALCOHOL LEVEL - Abnormal    Alcohol ethyl <0.01 (*)    MAGNESIUM - Abnormal    Magnesium 1.4 (*)    CBC WITH PLATELETS AND DIFFERENTIAL - Abnormal    WBC Count 9.3      RBC Count 4.79      Hemoglobin 15.8 (*)     Hematocrit 47.3 (*)     MCV 99      MCH  33.0      MCHC 33.4      RDW 12.8      Platelet Count 195      % Neutrophils 74      % Lymphocytes 17      % Monocytes 7      % Eosinophils 1      % Basophils 1      % Immature Granulocytes 0      NRBCs per 100 WBC 0      Absolute Neutrophils 6.9      Absolute Lymphocytes 1.6      Absolute Monocytes 0.6      Absolute Eosinophils 0.1      Absolute Basophils 0.1      Absolute Immature Granulocytes 0.0      Absolute NRBCs 0.0     ROUTINE UA WITH MICROSCOPIC - Abnormal    Color Urine Orange (*)     Appearance Urine Slightly Cloudy (*)     Glucose Urine 50  (*)     Bilirubin Urine Moderate (*)     Ketones Urine 60  (*)     Specific Gravity Urine 1.034      Blood Urine Trace (*)     pH Urine 6.0      Protein Albumin Urine 200  (*)     Urobilinogen Urine 6.0 (*)     Nitrite Urine Negative      Leukocyte Esterase Urine Large (*)     Bacteria Urine Few (*)     Mucus Urine Present (*)     RBC Urine 40 (*)     WBC Urine 26 (*)     Squamous Epithelials Urine 25 (*)     Transitional Epithelials Urine 1      Hyaline Casts Urine 130 (*)    INR - Normal    INR 0.95     PARTIAL THROMBOPLASTIN TIME - Normal    aPTT 25     LIPASE - Normal    Lipase 31     LACTIC ACID WHOLE BLOOD - Normal    Lactic Acid 1.0     PHOSPHORUS - Normal    Phosphorus 2.9     COVID-19 VIRUS (CORONAVIRUS) BY PCR - Normal    SARS CoV2 PCR Negative     TYPE AND SCREEN, ADULT    ABO/RH(D) O POS      Antibody Screen Negative      SPECIMEN EXPIRATION DATE [id number]     URINE CULTURE   ABO/RH TYPE AND SCREEN      Emergency Department Course:     Reviewed:  I reviewed nursing notes, vitals, past medical history and Care Everywhere.    Assessments:  0751 I obtained history and examined the patient as noted above.   1120 I rechecked the patient and explained findings. I discussed plan for admission and the patient is in agreement.    Consults:  1142 I consulted with Beronica Hoffman PA-C, a hospitalist, regarding the patient's history and presentation here in the  emergency department who accepted the patient for admission.    Interventions:  810 Pantoprazole 80 mg IV  0811 Ondansetron 4 mg IV  0819 NS 1 L IV  0846 Diazepam 5 mg IV  0846 Magnesium sulfate 2 g IV  0933 Diazepam 10 mg IV  0934 Potassium chloride 10 mEq IV    Disposition:  The patient was admitted to the hospital under the care of Dr. Henley.     Impression & Plan     Medical Decision Makin-year-old woman past medical history significant for bulimia and alcohol abuse presenting with reports of 36 hours of nausea vomiting and potentially mild hematemesis versus bilious vomiting.  She had predominantly epigastric abdominal tenderness prompting CT imaging, this is unremarkable except for fatty liver infiltrate, LFTs and lipase are within normal limits labs notable for mild hypomagnesemia at 1.4, mild hypokalemia at 3.3 with mildly elevated anion gap however lactate was within normal limits.  She was noted on exam to be tremulous, tachycardic, hypertensive with high suspicion for alcohol as etiology of this she is however here with her mother and does not want her mom to know about her alcohol abuse.  She reports her last drink was 2 days prior, she does not have a known history of varices though has not had an EGD.  Given persistent withdrawal symptoms despite multiple doses of IV Valium, will admit to medicine for continued alcohol withdrawal treatment.    Diagnosis:    ICD-10-CM    1. Hypokalemia  E87.6    2. Hypomagnesemia  E83.42    3. Alcohol withdrawal syndrome without complication (H)  F10.230    4. Hematemesis, presence of nausea not specified  K92.0     possible     Dougie Oro MD  [employer]  11:45 AM 08/15/22     Scribe Disclosure:  IAna Rosa, am serving as a scribe at 7:46 AM on 8/15/2022 to document services personally performed by Dougie Oro MD based on my observations and the provider's statements to me.     Darby LOMELI, am serving as  a scribe at 7:46 AM on 8/15/2022 to document services personally performed by Dougie Oro MD based on my observations and the provider's statements to me.       Dougie Oro MD  08/15/22 [id number]

## 2022-08-15 NOTE — ED TRIAGE NOTES
Patient presents to the ED reporting vomiting for the past 36 hours. States is unable to keep anything down. Reports that vomit has been black with some bright red blood in it as well.

## 2022-08-15 NOTE — H&P
History and Physical     Mariya Núñez MRN# [id number]   YOB: 1975 Age: 47 year old      Date of Admission:  8/15/2022    Primary care provider: No Ref-Primary, Physician          Assessment and Plan:   Mariya Núñez is a 47 year old female with a PMH significant for alcoholism, anxiety and bulimia who presents with intractable nausea, vomiting and hematemesis.     Patient was discussed with Dr. Oro, who was provider in ED. Chart review of ED work up was reviewed as well as chart review of Care Everywhere, previous visits and admissions.     #Suspect alcoholic gastritis and/or Eva-Palmer tear  #Intractable nausea/vomiting and generalized stomach discomfort  -Patient reports binge drinking session from 8/4 through 8/11 with development of intractable nausea, vomiting and abdominal pain on 8/13.    -Now describes inability to keep anything down along with generalized stomach discomfort and intractable vomiting with hematemesis without melena or hematochezia  -Hemoglobin 15.8 but there is some degree of volume contraction, she is mildly tachycardic but I suspect that is related to withdrawal/dehydration  -Repeat hemoglobin this afternoon  -CT of the abdomen is unremarkable except for fatty liver, lipase is normal  -On exam her abdomen is nondistended, soft and diffusely tender to palpation  -No further episodes of hematemesis in the ED  -Protonix IV BID  -If she has further evidence of bleeding will obtain GI consult for possible EGD  -Fluid support, clear liquid diet    #Alcohol withdrawal  #Alcoholism  -Last alcoholic beverage on 8/11 with development of above symptoms  -Patient is tremulous in the ED, nauseous and anxious.  Tachycardic with hypertension.  Visibly looks like she is going through withdrawal  -Continue CIWA protocol with Valium triggered dosing, gabapentin protocol ordered  -IV fluids with vitamins which we will transition to oral once ready  -Monitor electrolytes and replace  as needed  -Please do not discuss alcohol use in front of her mother  -Patient may possibly want to see chemical dependency    #Hypokalemia  #Hypomagnesemia  -Will order replacement protocol      #Anxiety  -Unfortunately missed her psychiatry appointment today with new psychiatrist  -Would like to see psychiatry while admitted  -Continue Wellbutrin, Zoloft, Topamax and trazodone    Social: Concern for alcoholism  Code: Discussed with patient  and they have chosen full code  VTE prophylaxis: PCDs  Disposition: Inpatient                    Chief Complaint:   Intractable nausea, vomiting and abdominal pain with hematemesis         History of Present Illness:   Mariya Núñez is a 47 year old female who presents with intractable nausea, vomiting and abdominal discomfort that started on the evening of 8/13.  She admits to having a prolonged binge drinking session from August 4 through 11.  She has not drink since but has had development of intractable nausea, vomiting and abdominal discomfort.  After she developed the vomiting she had a couple episodes of hematemesis but no bright red blood in her stool or melena.  She has never had stomach pain like this before or hematemesis.  She does not use any illegal drugs but does smoke 4 to 5 cigarettes a day.  She denies fever, shortness of breath, cough, chest discomfort, diarrhea and urinary symptoms.             Past Medical History:   Alcohol abuse, bulimia and anxiety          Past Surgical History:     Hysterectomy  Elbow repair          Social History:     Social History     Socioeconomic History     Marital status: Single     Spouse name: Not on file     Number of children: Not on file     Years of education: Not on file     Highest education level: Not on file   Occupational History     Not on file   Tobacco Use     Smoking status: Current Every Day Smoker     Types: Cigarettes     Smokeless tobacco: Never Used   Substance and Sexual Activity     Alcohol use: Not on  file     Drug use: Not on file     Sexual activity: Not on file   Other Topics Concern     Not on file   Social History Narrative     Not on file     Social Determinants of Health     Financial Resource Strain: Not on file   Food Insecurity: Not on file   Transportation Needs: Not on file   Physical Activity: Not on file   Stress: Not on file   Social Connections: Not on file   Intimate Partner Violence: Not on file   Housing Stability: Not on file               Family History:   Family history reviewed and is non contributory         Allergies:    No Known Allergies            Medications:     Prior to Admission medications    Medication Sig Last Dose Taking? Auth Provider Long Term End Date   ALPRAZolam (XANAX) 0.5 MG tablet Take 0.5 mg by mouth 3 times daily as needed for anxiety More than a month at Unknown time Yes Reported, Patient     buPROPion (WELLBUTRIN XL) 300 MG 24 hr tablet Take 300 mg by mouth every morning 8/14/2022 at am Yes Unknown, Entered By History No    sertraline (ZOLOFT) 50 MG tablet Take 50 mg by mouth daily 8/14/2022 at am Yes Unknown, Entered By History Yes    topiramate (TOPAMAX) 100 MG tablet Take 150 mg by mouth At Bedtime 8/14/2022 at hs Yes Unknown, Entered By History No    traZODone (DESYREL) 150 MG tablet Take  mg by mouth nightly as needed for sleep Past Week at Unknown time Yes Unknown, Entered By History Yes               Review of Systems:   A Comprehensive greater than 10 system review of systems was carried out.  Pertinent positives and negatives are noted above.  Otherwise negative for contributory information.            Physical Exam:   Blood pressure 104/83, pulse 108, temperature 96.8  F (36  C), temperature source Temporal, resp. rate 18, SpO2 95 %, not currently breastfeeding.  Exam:  GENERAL: Tremulous, seems uncomfortable  PSYCH: oriented  HEENT:  PERRLA. Normal conjunctiva, normal hearing, nasal mucosa and Oropharynx are normal.  NECK:  Supple, no neck vein  distention, adenopathy or bruits, normal thyroid.  HEART:  Tachycardic with no murmur, no pericardial rub, gallops or S3 or S4.  LUNGS:  Clear to auscultation, normal Respiratory effort. No wheezing, rales or ronchi.  ABDOMEN:  Soft, no hepatosplenomegaly, normal bowel sounds. Non-tender, non distended.   EXTREMITIES:  No pedal edema, +2 pulses bilateral and equal.  SKIN: Small scabs noted on bilateral arms and stomach without surrounding erythema or signs of infection  NEUROLOGIC:  CN 2-12 grossly intact,  sensation is intact with no focal deficits.               Data:     Recent Labs   Lab 08/15/22  0752   WBC 9.3   HGB 15.8*   HCT 47.3*   MCV 99        Recent Labs   Lab 08/15/22  0752      POTASSIUM 3.3*   CHLORIDE 94*   CO2 24   ANIONGAP 19*   *   BUN 13.3   CR 0.70   GFRESTIMATED >90   KATHRIN 10.1*   MAG 1.4*   PHOS 2.9   PROTTOTAL 7.5   ALBUMIN 4.7   BILITOTAL 1.5*   ALKPHOS 89   *   ALT 29     Recent Labs   Lab 08/15/22  0752   LIPASE 31         Recent Results (from the past 24 hour(s))   Abd/pelvis CT,  IV  contrast only TRAUMA / AAA    Narrative    CT ABDOMEN PELVIS WITH CONTRAST 8/15/2022 10:30 AM    CLINICAL HISTORY: Abdominal pain, upper abdominal, ETOH abuse.    TECHNIQUE: CT scan of the abdomen and pelvis was performed following  injection of IV contrast. Multiplanar reformats were obtained. Dose  reduction techniques were used.  CONTRAST: 71mL Isovue-370    COMPARISON: None.    FINDINGS:   LOWER CHEST: Minimal peripheral atelectasis and/or fibrosis. No  consolidation or effusion.    HEPATOBILIARY: Diffuse hepatic steatosis. No significant mass. No bile  duct dilatation. No calcified gallstones.    PANCREAS: No significant mass, duct dilatation, or inflammatory  change.    SPLEEN: Normal size.    ADRENAL GLANDS: No significant nodules.    KIDNEYS/BLADDER: No significant mass, stones, or hydronephrosis.    BOWEL: No obstruction or inflammatory change. Normal appendix.    PELVIC  ORGANS: No pelvic masses. IUD centrally positioned in the  uterus.    ADDITIONAL FINDINGS: No ascites.    MUSCULOSKELETAL: No frankly destructive bony lesions. Spine  degenerative changes including advanced degenerative disc disease in  the lower lumbar spine.      Impression    IMPRESSION:   1.  Marked fatty infiltration of the liver compatible with history.  Steatohepatitis not excluded.  2.  Otherwise no acute process demonstrated.          Beronica Hoffman PA-C    This patient was seen and discussed with Dr. Henley who agrees with the current plans as outlined above.

## 2022-08-15 NOTE — PHARMACY-ADMISSION MEDICATION HISTORY
Admission medication history interview status for this patient is complete. See [facility] admission navigator for allergy information, prior to admission medications and immunization status.     Medication history interview done, indicate source(s): Patient  Medication history resources (including written lists, pill bottles, clinic record):None  Pharmacy: [facility] [phone]    Changes made to PTA medication list:  Added: sertraline, trazodone  Changed: bupropion 75 mg BID --> bupropion  mg once daily, topiramate (25 mg BID --> 150 mg at bedtime)  Reported as Not Taking: none  Removed: none    Actions taken by pharmacist (provider contacted, etc):None   Additional medication history information:None  Medication reconciliation/reorder completed by provider prior to medication history?  no     Prior to Admission medications    Medication Sig Last Dose Taking? Auth Provider Long Term End Date   ALPRAZolam (XANAX) 0.5 MG tablet Take 0.5 mg by mouth 3 times daily as needed for anxiety More than a month at Unknown time Yes Reported, Patient     buPROPion (WELLBUTRIN XL) 300 MG 24 hr tablet Take 300 mg by mouth every morning 8/14/2022 at am Yes Unknown, Entered By History No    sertraline (ZOLOFT) 50 MG tablet Take 50 mg by mouth daily 8/14/2022 at am Yes Unknown, Entered By History Yes    topiramate (TOPAMAX) 100 MG tablet Take 150 mg by mouth At Bedtime 8/14/2022 at hs Yes Unknown, Entered By History No    traZODone (DESYREL) 150 MG tablet Take  mg by mouth nightly as needed for sleep Past Week at Unknown time Yes Unknown, Entered By History Yes

## 2022-08-15 NOTE — PLAN OF CARE
Goal Outcome Evaluation:    Pertinent assessments: Assumed care of pt from 0889-5030. VSS. A&Ox4. Apical pulse regular. Lungs CTA. Bowel sounds active in all quadrants. Tolerating clear liquid diet. Voiding spontaneously with adequate output. MIVF running at 100cc/hr. Denies pain.   Major Shift Events: up to floor, chem dep consult completed  Treatment Plan: serial HGB, pain management, monitor for bloody output

## 2022-08-15 NOTE — CONSULTS
"Triage and Transition - Consult and Liaison     Mariya Núñez  August 15, 2022    Session start: 3:43p  Session end: 4:10 pm  Session duration in minutes: 27  CPT utilized: 53576 - Brief diagnostic assessment (modifier 52)  Patient was seen virtually ([employer] cart or other teleconferencing device).    Diagnosis:   300.02 (F41.1) Generalized Anxiety Disorder, present, primary;  311 (F32.9) Unspecified Depressive Disorder , by history;     Plan/Recommendations:     Continue care coordination.    Will ask provider to review/comment on meds.    Pt expresses interest in OP mental health therapy and med. Management, appt setups as follows: (setting up currently)   Date: Wednesday, 8/17/2022  Time: 1:00 pm - 2:00 pm  Provider: Laura Morales MA  Supervised by: Kristin Chan MS  LMFT  Location: [facility], [address] [place] [address]  Phone: [phone]  Type: Therapy - Initial (In-Person)    Date: Wednesday, 8/24/2022  Time: 10:00 am - 11:00 am  Provider: Telma Park  MSN  CNP  Location: [facility], [address] [place] [address] [address] [facility] [address], [place] [address]  Phone: [phone]  Type: Medication Mgmt - Initial (In-Person)      Maintain current transition plan per care team.    Patient will not continue to be followed by this service.    Reason for consult: Psychiatry consult was requested due to \"anxiety with alcohol abuse\". Patient was seen by Triage and Transition Consult & Liaison team.     Identifying information: Mariya is 47 year old White  female   followed related to \"anxiety with alcohol abuse\".  Pt reports she lives in an apartment in [place].  Pt reports she works M-Sat. as a [employer] worker\".    Summary of Patient Situation  Pt agreeable to meet via TH today.  Pt mom in room prior to meeting, pt asked mom to leave room for assessment.  Per chart review pt with a hx of unspecified depression and alcohol use dx.    Pt today presents with anxiety sx including " "excessive worry, difficulty controlling worry.  Pt reports anxiety sx increasing with \"the start of covid, and a recent vacation\".  Pt denies any current depression sx.  Pt denies any SI, HI, AH/VH.  Pt reports she is open to CD consult to discuss alcohol use.  Pt reports she is hopeful and future oriented looking forward to getting back to daily life and seeing her cat.  Pt reports she does not see a therapist however expresses desire to do so (OP appt set up in plan).  Pt reports she had a OP psychiatry appt for today however missed because she in hosp.  Pt expresses desire in OP med. Management (OP appt set up in plan).  Pt reports the meds she is taking are not currently helping completely, pt interested in seeing onsite provider (LM for provider to comment on meds).      Upon inquiry regarding supports, pt reports she has adequate supports including \"my mom, bother and I have a lot of supportive friends\".      Upon inquiry regarding coping skills pt reports historical coping skills of walking and distraction.  Clinician psychoeducated on additional coping skills including deep breathing, and progressive muscle relaxation.  Pt reports she is Qu[employer] in [place] and that both her parents are/were retired pastors.        Collateral information:   Reviewed chart and coordinated with team.      Mental Status Exam   Affect: Appropriate  Appearance: Appropriate   Attention Span/Concentration: Attentive    Eye Contact: Engaged  Fund of Knowledge: Appropriate   Language /Speech Content: Fluent  Language /Speech Volume: Normal   Language /Speech Rate/Productions: Normal   Recent Memory: Intact  Remote Memory: Intact  Mood: Anxious   Orientation:   Person: Yes   Place: Yes  Time of Day: Yes   Date: Yes   Situation (Do they understand why they are here?): Yes   Psychomotor Behavior: Normal   Thought Content: Clear  Thought Form: Intact    Current medications: Per EHR  Current Facility-Administered Medications   Medication "     buPROPion (WELLBUTRIN XL) 24 hr tablet 300 mg     diazepam (VALIUM) tablet 10 mg    Or     diazepam (VALIUM) injection 5-10 mg     flumazenil (ROMAZICON) injection 0.2 mg     [START ON 8/16/2022] folic acid (FOLVITE) tablet 1 mg     [START ON 8/22/2022] gabapentin (NEURONTIN) capsule 100 mg     [START ON 8/20/2022] gabapentin (NEURONTIN) capsule 300 mg     [START ON 8/18/2022] gabapentin (NEURONTIN) capsule 600 mg     gabapentin (NEURONTIN) capsule 900 mg     OLANZapine zydis (zyPREXA) ODT tab 5-10 mg    Or     haloperidol lactate (HALDOL) injection 2.5-5 mg     lidocaine (LMX4) cream     lidocaine 1 % 0.1-1 mL     melatonin tablet 5 mg     [START ON 8/16/2022] multivitamin w/minerals (THERA-VIT-M) tablet 1 tablet     ondansetron (ZOFRAN ODT) ODT tab 4 mg    Or     ondansetron (ZOFRAN) injection 4 mg     pantoprazole (PROTONIX) IV push injection 40 mg     prochlorperazine (COMPAZINE) injection 10 mg    Or     prochlorperazine (COMPAZINE) tablet 10 mg    Or     prochlorperazine (COMPAZINE) suppository 25 mg     senna-docusate (SENOKOT-S/PERICOLACE) 8.6-50 MG per tablet 1 tablet    Or     senna-docusate (SENOKOT-S/PERICOLACE) 8.6-50 MG per tablet 2 tablet     sertraline (ZOLOFT) tablet 50 mg     sodium chloride (PF) 0.9% PF flush 3 mL     sodium chloride (PF) 0.9% PF flush 3 mL     sodium chloride 0.9% infusion     [START ON 8/16/2022] thiamine (B-1) tablet 100 mg     topiramate (TOPAMAX) tablet 150 mg     traZODone (DESYREL) tablet 150 mg     Therapeutic intervention and progress:  Therapeutic intervention consisted of building therapeutic rapport, active listening, validation, thought reframing, engaging in learning/practicing coping skills, normalizing and CBT concepts. Patient is making progress towards treatment goals as evidenced by pt engaged in assessment, pt willingness to engage in therapy.      Nazanin Ibrahim MSW, LGSW  Triage and Transition - Consult and Liaison   [phone]

## 2022-08-15 NOTE — PROVIDER NOTIFICATION
MD notified about pt's abnormal electrolyte values. Waiting to hear if RN protocols will be initiated.

## 2022-08-15 NOTE — ED NOTES
[facility]  ED Nurse Handoff Report    Mariya Núñez is a 47 year old female   ED Chief complaint: Hematemesis  . ED Diagnosis:   Final diagnoses:   Hypokalemia   Hypomagnesemia   Alcohol withdrawal syndrome without complication (H)   Hematemesis, presence of nausea not specified - possible     Allergies: No Known Allergies    Code Status: Full Code  Activity level - Baseline/Home: Independent . Activity Level - Current: Stand by Assist. Lift room needed: No. Bariatric: No   Needed: No   Isolation: No. Infection: Not Applicable.     Vital Signs:   Vitals:    08/15/22 0901 08/15/22 0915 08/15/22 0930 08/15/22 1000   BP: (!) 121/95 (!) 126/97 (!) 134/103 104/83   BP Location:       Pulse: 109 99 98 108   Resp:       Temp:       TempSrc:       SpO2:  97% 97% 95%       Cardiac Rhythm:  ,      Pain level:    Patient confused: No. Patient Falls Risk: Yes.   Elimination Status: Has voided   Patient Report - Initial Complaint: Patient presents to the ED reporting vomiting for the past 36 hours. States is unable to keep anything down. Reports that vomit has been black with some bright red blood in it as well.    Focused Assessment: Other flowsheet entries - Auditory Disturbances: 0-->not present  Paroxysmal Sweats: 0-->no sweat visible  Nausea and Vomitin  Visual Disturbances: 0-->not present  Anxiety: 2  Tactile Disturbances: 0-->none  Tremor: 3  Score: 8  Headache, Fullness in Head: 0-->not present  Agitation: 1-->somewhat more than normal activity  Orientation and Clouding of Sensorium: 0-->oriented and can do serial additions   Hematemesis Assessments - Nausea/Vomiting Signs/Symptoms: brown; emesis   Respiratory WDL - Respiratory WDL: WDL   Cognitive/Neuro/Behavioral WDL - Cognitive/Neuro/Behavioral WDL: WDL   Gastrointestinal WDL - Gastrointestinal WDL: .WDL except; GI symptoms  GI Signs/Symptoms: abdominal discomfort; vomiting   Skin WDL - Skin WDL: .WDL except; characteristics; integrity   Skin Integrity: bruised  Bruised (ecchymotic) location: Right; Left; Fore Arm  Tests Performed: EKG. Labs, covid, abd CT. Abnormal Results:   Abnormal Labs Resulted from Time of ED Arrival to Time of ED Departure   COMPREHENSIVE METABOLIC PANEL - Abnormal       Result Value    Sodium 137      Potassium 3.3 (*)     Creatinine 0.70      Urea Nitrogen 13.3      Chloride 94 (*)     Carbon Dioxide (CO2) 24      Anion Gap 19 (*)     Glucose 147 (*)     Calcium 10.1 (*)     Protein Total 7.5      Albumin 4.7      Bilirubin Total 1.5 (*)     Alkaline Phosphatase 89       (*)     ALT 29      GFR Estimate >90     ETHYL ALCOHOL LEVEL - Abnormal    Alcohol ethyl <0.01 (*)    MAGNESIUM - Abnormal    Magnesium 1.4 (*)    CBC WITH PLATELETS AND DIFFERENTIAL - Abnormal    WBC Count 9.3      RBC Count 4.79      Hemoglobin 15.8 (*)     Hematocrit 47.3 (*)     MCV 99      MCH 33.0      MCHC 33.4      RDW 12.8      Platelet Count 195      % Neutrophils 74      % Lymphocytes 17      % Monocytes 7      % Eosinophils 1      % Basophils 1      % Immature Granulocytes 0      NRBCs per 100 WBC 0      Absolute Neutrophils 6.9      Absolute Lymphocytes 1.6      Absolute Monocytes 0.6      Absolute Eosinophils 0.1      Absolute Basophils 0.1      Absolute Immature Granulocytes 0.0      Absolute NRBCs 0.0     ROUTINE UA WITH MICROSCOPIC - Abnormal    Color Urine Orange (*)     Appearance Urine Slightly Cloudy (*)     Glucose Urine 50  (*)     Bilirubin Urine Moderate (*)     Ketones Urine 60  (*)     Specific Gravity Urine 1.034      Blood Urine Trace (*)     pH Urine 6.0      Protein Albumin Urine 200  (*)     Urobilinogen Urine 6.0 (*)     Nitrite Urine Negative      Leukocyte Esterase Urine Large (*)     Bacteria Urine Few (*)     Mucus Urine Present (*)     RBC Urine 40 (*)     WBC Urine 26 (*)     Squamous Epithelials Urine 25 (*)     Transitional Epithelials Urine 1      Hyaline Casts Urine 130 (*)       Abd/pelvis CT,  IV   contrast only TRAUMA / AAA   Preliminary Result   IMPRESSION:    1.  Marked fatty infiltration of the liver compatible with history.   Steatohepatitis not excluded.   2.  Otherwise no acute process demonstrated.         Treatments provided: Fluids; see MAR  Family Comments: Mom at bedside  OBS brochure/video discussed/provided to patient:  N/A  ED Medications:   Medications   OLANZapine zydis (zyPREXA) ODT tab 5-10 mg (has no administration in time range)     Or   haloperidol lactate (HALDOL) injection 2.5-5 mg (has no administration in time range)   flumazenil (ROMAZICON) injection 0.2 mg (has no administration in time range)   melatonin tablet 5 mg (has no administration in time range)   gabapentin (NEURONTIN) capsule 900 mg (has no administration in time range)   gabapentin (NEURONTIN) capsule 600 mg (has no administration in time range)   gabapentin (NEURONTIN) capsule 300 mg (has no administration in time range)   gabapentin (NEURONTIN) capsule 100 mg (has no administration in time range)   diazepam (VALIUM) tablet 10 mg (has no administration in time range)     Or   diazepam (VALIUM) injection 5-10 mg (has no administration in time range)   lactated ringers BOLUS 1,000 mL (1,000 mLs Intravenous New Bag 8/15/22 1211)   0.9% sodium chloride BOLUS (0 mLs Intravenous Stopped 8/15/22 0947)   pantoprazole (PROTONIX) IV push injection 80 mg (80 mg Intravenous Given 8/15/22 0810)   ondansetron (ZOFRAN) injection 4 mg (4 mg Intravenous Given 8/15/22 0811)   diazepam (VALIUM) injection 5 mg (5 mg Intravenous Given 8/15/22 0846)   magnesium sulfate 2 g in water intermittent infusion (0 g Intravenous Stopped 8/15/22 0953)   potassium chloride 10 mEq in 100 mL sterile water intermittent infusion (premix) (0 mEq Intravenous Stopped 8/15/22 1101)   diazepam (VALIUM) injection 10 mg (10 mg Intravenous Given 8/15/22 0933)   thiamine (B-1) injection 100 mg (100 mg Intravenous Given 8/15/22 1211)   iopamidol (ISOVUE-370)  solution 500 mL (71 mLs Intravenous Given 8/15/22 1019)   CT Scan Flush (59 mLs Intravenous Given 8/15/22 1019)   gabapentin (NEURONTIN) tablet 1,200 mg (1,200 mg Oral Given 8/15/22 1210)   thiamine (B-1) tablet 100 mg (100 mg Oral Given 8/15/22 1210)   folic acid (FOLVITE) tablet 1 mg (1 mg Oral Given 8/15/22 1210)   multivitamin w/minerals (THERA-VIT-M) tablet 1 tablet (1 tablet Oral Given 8/15/22 1210)     Drips infusing:  No  For the majority of the shift, the patient's behavior Green. Interventions performed were N/A.    Sepsis treatment initiated: No     Patient tested for COVID 19 prior to admission: YES    ED Nurse Name/Phone Number: Mariya Meyers RN,   12:23 PM    RECEIVING UNIT ED HANDOFF REVIEW    Above ED Nurse Handoff Report was reviewed: Yes  Reviewed by: ANTHONY JUAN RN on August 15, 2022 at 1:10 PM

## 2022-08-15 NOTE — PROGRESS NOTES
SP[facility] Progress Note    MS 5    Met with patient and mother regarding advance directive consult request.    Patient is Episcopal and looks to her [place] as a resource.    Mother is a retired [employer] .   father was a [employer]  as well.    Medical record notes indicate that patient does NOT want her alcohol use discussed around her mother.    Briefly described all advance directive documents and left them with patient. (She described symptoms of nausea and said that she would like to look at them when she is feeling better.    SHS remains available if further needs arise.      Rev. Lu Smiley M.Div.  Staff   Phone  [phone]

## 2022-08-15 NOTE — DISCHARGE INSTRUCTIONS
"Your hospital follow up appointment has been scheduled for you with Dr. Juan Zapata at [facility] for Tuesday, August 23 at 9:10 am. Please bring your hospital discharge instructions, a photo ID, and insurance information with you to your appointment. Please call the clinic at [phone] if you need to reschedule.       Mental Health:    Pt expresses interest in OP mental health therapy and med. Management, appt setups as follows: (setting up currently)   Date: Wednesday, 8/17/2022  Time: 1:00 pm - 2:00 pm  Provider: Laura Morales MA  Supervised by: Kristin Chan MS  LMFT  Location: [facility], [address] [place] [address] [address] [place] [place] [id number]  Phone: [phone]  Type: Therapy - Initial (In-Person)     Date: Wednesday, 8/24/2022  Time: 10:00 am - 11:00 am  Provider: Telma Park  MSN  CNP  Location: [facility], [address] [place] [address], [address] [address]  Phone: [phone]  Type: Medication Mgmt - Initial (In-Person)          Please call Mental Health and Addiction Services Line: [phone] and make an appointment through [facility] if you would like an evaluation and referrals to chemical dependency treatment after you are discharged.     Chemical Dependency Resources:     [place]  Medityplus  [email or web address]     Supportive Services   SMART Recovery  https://www.smartrecovery.org     Alcoholics Anonymous  http://www.aa.org  Community Drug & Alcohol Support Resources   Alcoholics Anonymous   24/7 Phone Line: [phone]   https://aam[place].org/   https://aamin[place].org/   For additional list of online meetings: http://aa-intergroup.org       Narcotics Anonymous of [place]   24 Hour Helpline: 1-469.394.5291   https://www.[employer]Data.org/   For online meetings, click \"Find a Meeting\" on [employer]      [place] [facility]   [address] [place] [address]   Phone: " [phone]  http://[email or web address]

## 2022-08-16 VITALS
OXYGEN SATURATION: 91 % | HEIGHT: 66 IN | TEMPERATURE: 98.6 F | BODY MASS INDEX: 30.76 KG/M2 | RESPIRATION RATE: 16 BRPM | DIASTOLIC BLOOD PRESSURE: 78 MMHG | SYSTOLIC BLOOD PRESSURE: 117 MMHG | WEIGHT: 191.4 LBS | HEART RATE: 71 BPM

## 2022-08-16 LAB
ALBUMIN SERPL BCG-MCNC: 3.5 G/DL (ref 3.5–5.2)
ALP SERPL-CCNC: 67 U/L (ref 35–104)
ALT SERPL W P-5'-P-CCNC: 43 U/L (ref 10–35)
ANION GAP SERPL CALCULATED.3IONS-SCNC: 8 MMOL/L (ref 7–15)
AST SERPL W P-5'-P-CCNC: 221 U/L (ref 10–35)
BACTERIA UR CULT: NORMAL
BILIRUB SERPL-MCNC: 1.1 MG/DL
BUN SERPL-MCNC: 7.5 MG/DL (ref 6–20)
CALCIUM SERPL-MCNC: 8.5 MG/DL (ref 8.6–10)
CHLORIDE SERPL-SCNC: 106 MMOL/L (ref 98–107)
CREAT SERPL-MCNC: 0.74 MG/DL (ref 0.51–0.95)
DEPRECATED HCO3 PLAS-SCNC: 28 MMOL/L (ref 22–29)
ERYTHROCYTE [DISTWIDTH] IN BLOOD BY AUTOMATED COUNT: 12.6 % (ref 10–15)
GFR SERPL CREATININE-BSD FRML MDRD: >90 ML/MIN/1.73M2
GLUCOSE SERPL-MCNC: 113 MG/DL (ref 70–99)
HCT VFR BLD AUTO: 39.8 % (ref 35–47)
HGB BLD-MCNC: 12.8 G/DL (ref 11.7–15.7)
HGB BLD-MCNC: 12.8 G/DL (ref 11.7–15.7)
MAGNESIUM SERPL-MCNC: 1.9 MG/DL (ref 1.7–2.3)
MCH RBC QN AUTO: 33.3 PG (ref 26.5–33)
MCHC RBC AUTO-ENTMCNC: 32.2 G/DL (ref 31.5–36.5)
MCV RBC AUTO: 104 FL (ref 78–100)
PLATELET # BLD AUTO: 122 10E3/UL (ref 150–450)
POTASSIUM SERPL-SCNC: 3.6 MMOL/L (ref 3.4–5.3)
PROT SERPL-MCNC: 5.7 G/DL (ref 6.4–8.3)
RBC # BLD AUTO: 3.84 10E6/UL (ref 3.8–5.2)
SODIUM SERPL-SCNC: 142 MMOL/L (ref 136–145)
WBC # BLD AUTO: 5.7 10E3/UL (ref 4–11)

## 2022-08-16 PROCEDURE — 999N000216 HC STATISTIC ADULT CD FACE TO FACE-NO CHRG

## 2022-08-16 PROCEDURE — 36415 COLL VENOUS BLD VENIPUNCTURE: CPT | Performed by: PHYSICIAN ASSISTANT

## 2022-08-16 PROCEDURE — 83735 ASSAY OF MAGNESIUM: CPT | Performed by: HOSPITALIST

## 2022-08-16 PROCEDURE — 99222 1ST HOSP IP/OBS MODERATE 55: CPT | Performed by: NURSE PRACTITIONER

## 2022-08-16 PROCEDURE — 99239 HOSP IP/OBS DSCHRG MGMT >30: CPT | Performed by: HOSPITALIST

## 2022-08-16 PROCEDURE — 250N000011 HC RX IP 250 OP 636: Performed by: PHYSICIAN ASSISTANT

## 2022-08-16 PROCEDURE — 85027 COMPLETE CBC AUTOMATED: CPT | Performed by: PHYSICIAN ASSISTANT

## 2022-08-16 PROCEDURE — 80053 COMPREHEN METABOLIC PANEL: CPT | Performed by: PHYSICIAN ASSISTANT

## 2022-08-16 PROCEDURE — 250N000013 HC RX MED GY IP 250 OP 250 PS 637: Performed by: PHYSICIAN ASSISTANT

## 2022-08-16 PROCEDURE — C9113 INJ PANTOPRAZOLE SODIUM, VIA: HCPCS | Performed by: PHYSICIAN ASSISTANT

## 2022-08-16 RX ORDER — FOLIC ACID 1 MG/1
1 TABLET ORAL DAILY
Qty: 30 TABLET | Refills: 0 | Status: SHIPPED | OUTPATIENT
Start: 2022-08-17 | End: 2022-09-16

## 2022-08-16 RX ORDER — PANTOPRAZOLE SODIUM 40 MG/1
40 TABLET, DELAYED RELEASE ORAL 2 TIMES DAILY
Qty: 60 TABLET | Refills: 0 | Status: SHIPPED | OUTPATIENT
Start: 2022-08-16 | End: 2022-09-01

## 2022-08-16 RX ORDER — LANOLIN ALCOHOL/MO/W.PET/CERES
100 CREAM (GRAM) TOPICAL DAILY
Qty: 30 TABLET | Refills: 0 | Status: SHIPPED | OUTPATIENT
Start: 2022-08-17 | End: 2022-09-16

## 2022-08-16 RX ADMIN — BUPROPION HYDROCHLORIDE 300 MG: 150 TABLET, FILM COATED, EXTENDED RELEASE ORAL at 09:14

## 2022-08-16 RX ADMIN — PANTOPRAZOLE SODIUM 40 MG: 40 INJECTION, POWDER, FOR SOLUTION INTRAVENOUS at 09:14

## 2022-08-16 RX ADMIN — GABAPENTIN 900 MG: 300 CAPSULE ORAL at 12:27

## 2022-08-16 RX ADMIN — GABAPENTIN 900 MG: 300 CAPSULE ORAL at 04:23

## 2022-08-16 RX ADMIN — THIAMINE HCL TAB 100 MG 100 MG: 100 TAB at 09:14

## 2022-08-16 RX ADMIN — MULTIPLE VITAMINS W/ MINERALS TAB 1 TABLET: TAB at 09:14

## 2022-08-16 RX ADMIN — Medication 1 MG: at 09:14

## 2022-08-16 RX ADMIN — SERTRALINE HYDROCHLORIDE 50 MG: 50 TABLET ORAL at 09:14

## 2022-08-16 ASSESSMENT — ACTIVITIES OF DAILY LIVING (ADL)
ADLS_ACUITY_SCORE: 22
DEPENDENT_IADLS:: INDEPENDENT

## 2022-08-16 NOTE — CONSULTS
"Psychiatry Consultation      Patient name: Mariya Núñez   MRN: [id number]    Age: 47 year old    YOB: 1975    Please refer to Triage and Transition - Consult and Liaison  note for additional historical information, safety assessment, and psychosocial treatment details:     Reason for consult: Chart review and recommend medications  for medications     Diagnoses:    Alcohol use, intoxication, withdrawal: Resolved.   Mood changes due to above with anxiety features.  Adjustment reaction with anxiety due to psychosocial stressors.    Depression with anxiety.       Recommendations:  Referral to psychiatry and psychology: Consult team is assisting with that.  Follow up with psychologist as soon as the appointment is set.  Care management to provide information of crisis management/MICD resources if she is interested,   No new psychiatric medications warranted: patient does not wish for this.   Stable to discharge from psychiatric standpoint.     Mental status examination:    General Appearance: Not in acute distress, resting comfortably in bed.    Behavior: Good eye contact, no bizarre ideations  Speech: Coherent.  Thought Process: Linear, clear.  Thought content: No evidence of hallucinations, delusions or paranoia.    Thought Formation: Associations are connected  Judgment: Fair  Insight : Intact  Attention : Adequate  Memory: Sufficient  Fund Of Knowledge: Average  Affect: Neutral  Mood: Congruent  Alert : Awake  Suicidal ideation: Absent  Homicidal ideation: Absent  Orientation: X 4  Comprehension: Sufficient pertaining to current medical needs  Generative thought content: Adequate.  Spontaneous conversation  Language: Intact  Gait and Ambulation: Gait and ambulation at baseline.    Musculoskeletal: No tonal abnormalities      /78 (BP Location: Left arm)   Pulse 71   Temp 98.6  F (37  C) (Oral)   Resp 16   Ht 1.676 m (5' 6\")   Wt 86.8 kg (191 lb 6.4 oz)   SpO2 91%   BMI 30.89 kg/m  "         Pertinent information related to this encounter:      Reviewed [facility] notes, RN notes, Labs.    Mariya Núñez is a 47 year old female who presents with intractable nausea, vomiting and abdominal discomfort that started on the evening of 8/13.  She admits to having a prolonged binge drinking session from August 4 through 11.  She has not drink since but has had development of intractable nausea, vomiting and abdominal discomfort.  After she developed the vomiting she had a couple episodes of hematemesis but no bright red blood in her stool or melena.  She has never had stomach pain like this before or hematemesis.  She does not use any illegal drugs but does smoke 4 to 5 cigarettes a day.  She denies fever, shortness of breath, cough, chest discomfort, diarrhea and urinary symptoms.     Upon assessment via ipad in patient room 502 at [facility], provider location remote office in [facility]  for 15 minutes duration. Patient is consenting for this video visit.   Upon assessment, Ms Dempsey was noted to be pleasant, and reliable. She engaged in the conversation voluntarily.  Endorsing of multiple stressors in her life and not having anyone to talk to led to relapse on alcohol, after maintaining sobriety for several years. She does not want to consume alcohol as I worsens anxiety and overall her mood and well being. Therefore planning to continue with mental health services to include Psychology and psychiatry before leaving this hospital.  She use to have mental health providers at [facility] however due to a change in her insurance provider and poor coverage, she had to drop some services.   She works full time and wants to resume her work ASAP. Citing strong family support and having her mom and brother there for immediate support needs.  She has no thoughts of self harm, no hallucinations or paranoia.  Her medications are adequate and she does not want any augmentation, addition or changes to that regimen. She prefers  "to be set up with a psychiatric provider first and then review her medications.   Feeling safe, she would like to go home to be with her family and \" my cat\".She is worried about her cat also and thinks that her relapse may have \" worried my cat\".     Reviewed home medications to include but not limited to alprazolam 0.5 mg 3 times a day as needed.  Bupropion 300 mg daily.  Zoloft 50 mg daily.  Topiramate 150 mg at bedtime.  Trazodone 150 mg at bedtime.  Patient wants to continue with the aforementioned medications at this time until she sees the outpatient provider for psychiatry.    Medical History:    Refer to the latest internal medicine/hospitalist note which I reviewed.   No past medical history on file.         Medications:  Reviewed in epic   .medicati   Vital Signs:    B/P: 117/78, T: 98.6, P: 71, R: 16  Estimated body mass index is 30.89 kg/m  as calculated from the following:    Height as of this encounter: 1.676 m (5' 6\").    Weight as of this encounter: 86.8 kg (191 lb 6.4 oz).                   Please reconsult with psychiatry as needed.   TREVON Pereira CNP                           Medical History:  Refer to the latest internal medicine/hospitalist note which I reviewed.   No past medical history on file.         Medications:      Vital Signs:    B/P: 117/78, T: 98.6, P: 71, R: 16  Estimated body mass index is 30.89 kg/m  as calculated from the following:    Height as of this encounter: 1.676 m (5' 6\").    Weight as of this encounter: 86.8 kg (191 lb 6.4 oz).                    Please reconsult with psychiatry as needed.   TREVON Pereira CNP         "

## 2022-08-16 NOTE — PROGRESS NOTES
Care Management Discharge Note    Discharge Date: 08/16/2022       Discharge Disposition: Outpatient Mental Health, Outpatient Chemical Dependency    Discharge Services: Mental Health Resources, Chemical Dependency Resources    Discharge DME: None    Discharge Transportation: family or friend will provide    Private pay costs discussed: Not applicable    PAS Confirmation Code:  (N/A)  Patient/family educated on Medicare website which has current facility and service quality ratings: yes    Education Provided on the Discharge Plan:  yes  Persons Notified of Discharge Plans: Pt, pt's bedside nurse  Patient/Family in Agreement with the Plan: yes    Handoff Referral Completed: No    Additional Information:  Sw was seen by both MH and CD during this hospitalization.  There were OP MH appointments scheduled for the pt and they are on her discharge AVS.  Sw updated the pt's AVS to include the CD resources that were emailed to the pt.        SANJAY Brody, [facility]  Inpatient Care Coordination  [facility]  [phone]

## 2022-08-16 NOTE — DISCHARGE SUMMARY
[facility]    Discharge Summary  Hospitalist    Date of Admission:  8/15/2022  Date of Discharge:  8/16/2022  Discharging Provider: Enrique Henley MD  Date of Service (when I saw the patient): 08/16/22    Discharge Diagnoses   #Nausea with emesis  #Suspected Scarlet-Palmer tear from emesis  #Alcohol use disorder with withdrawal  #History of depression/anxiety  #Obesity  #Elevation of LFTs secondary to alcohol use    Hospital Course   Mariya Núñez is a 47 year old female with a PMH significant for alcoholism, anxiety and bulimia who presents with intractable nausea, vomiting and hematemesis.     #Nausea, vomiting. Suspected scarlet palmer tear. Alcohol use disorder with withdrawal: Patient reports binge drinking session from 8/4 through 8/11 with development of intractable nausea, vomiting and abdominal pain on 8/13.    Her last drink was on the a.m. of 8/13.  Subsequently describes inability to keep anything down along with generalized stomach discomfort and intractable vomiting with hematemesis without melena or hematochezia.  On admission, hemoglobin 15.8 but likely contracted due to poor p.o. intake.  She had mild tachycardia and some evidence of withdrawal in the ER.  CT abdomen pelvis done showed marked fatty infiltration of the liver but no other acute abnormality.  -Patient was admitted to the medical floor.  She had hemoglobin trended which went from 15.8 to 13.0 with IV fluid.  She did not have further episodes of emesis while on the inpatient unit.  She was able to tolerate clear liquids on 8/15.  She then was advanced to a general diet which was also tolerated.  She did have 1 BM on the a.m. of 8/16 which was more black than usual.  She did not have any lightheadedness or dizziness.  She ambulated without difficulty.  She voiced preference for discharge home.  -She was also monitored for alcohol withdrawal.  She was treated with vitamins and gabapentin.  She did not require further  Valium for withdrawal.  She did meet with chemical dependency during her admission and given resources to assist with alcohol cessation.  She did feel comfortable going home with these resources.  -She will follow-up with her PCP in 1 week with a repeat CBC and CMP.  I did also prescribe pantoprazole twice daily for 30 days along with folic acid and thiamine.     #Hypokalemia. Hypomagnesemia: Replacement    #Elevated LFT's: LFTs elevated in a manner consistent with alcohol use.  INR within normal limits.  Alcohol cessation as above.  Recommend repeat CMP with PCP follow-up in 1 week.     #Anxiety/Depression: Unfortunately missed her psychiatry appointment on the day of admission with new psychiatrist  -Patient did request to see psychiatry while admitted.  She was continued on her home medications.  She was seen by psychiatry who encouraged her to continue her current regimen and she will follow-up as an outpatient.  She did not have SI or HI.      Enrique Henley MD    Code Status   Full Code       Primary Care Physician   Physician No Ref-Primary    Physical Exam   Temp: 98.6  F (37  C) Temp src: Oral BP: 117/78 Pulse: 71   Resp: 16 SpO2: 91 % O2 Device: None (Room air)    Vitals:    08/15/22 1749   Weight: 86.8 kg (191 lb 6.4 oz)     Vital Signs with Ranges  Temp:  [98.6  F (37  C)] 98.6  F (37  C)  Pulse:  [71-77] 71  Resp:  [16-18] 16  BP: (117-128)/(78-89) 117/78  SpO2:  [91 %-95 %] 91 %  I/O last 3 completed shifts:  In: 1481.67 [P.O.:100; I.V.:1381.67]  Out: -     Gen: Nontoxic. NAD.   HEENT: MMM, no oral lesions. No elevation of JVD noted  CV: regular. No murmur. Non-tachy  Resp: Nl WOB, Clear  Abd: Obese, BS+, No tenderness. No rebound or guarding  Ext: WWP. No edema  Neuro: Nonfocal.  Ambulates without difficulty.  No tremor noted.  Psych: Calm and cooperative    Discharge Disposition   Discharged to home  Condition at discharge: Stable    Consultations This Hospital Stay   ADVANCE DIRECTIVE IP  CONSULT  PSYCHIATRY IP CONSULT  CHEMICAL DEPENDENCY IP CONSULT  SOCIAL WORK IP CONSULT    Time Spent on this Encounter   I, Enrique Henley MD, personally saw the patient today and spent greater than 30 minutes discharging this patient.    Discharge Orders      Reason for your hospital stay    You were hospitalized for nausea and emesis.  You had some blood noted in your emesis on admission.  Your hemoglobin was trended and stabilized.  You did not require any transfusion.  You were also monitored for withdrawal.  You were seen by both chemical dependency and psychiatry.  You should continue pantoprazole twice daily for 30 days.      Utilize the resources provided by chemical dependency to continue abstaining from alcohol as this will be most critical moving forward for your general health.    I do recommend you follow-up with PCP in the next week with a repeat CBC and CMP.  You should also follow-up with your outpatient therapist and psychiatrist.     Follow-up and recommended labs and tests     Follow up with primary care provider, Physician No Ref-Primary, within 7 days for hospital follow- up.  The following labs/tests are recommended: CBC and CMP.    Recommend follow-up with your outpatient therapist and psychiatrist in the coming weeks.  Utilize resources made available through chemical dependency to abstain from alcohol moving forward.     Activity    Your activity upon discharge: activity as tolerated     Full Code     Diet    Follow this diet upon discharge: Orders Placed This Encounter      Regular Diet Adult     Discharge Medications   Current Discharge Medication List      START taking these medications    Details   folic acid (FOLVITE) 1 MG tablet Take 1 tablet (1 mg) by mouth daily for 30 days  Qty: 30 tablet, Refills: 0    Associated Diagnoses: Alcohol withdrawal syndrome without complication (H)      pantoprazole (PROTONIX) 40 MG EC tablet Take 1 tablet (40 mg) by mouth 2 times daily for 30 days  Qty: 60  tablet, Refills: 0    Associated Diagnoses: Alcohol withdrawal syndrome without complication (H); Nausea and vomiting, intractability of vomiting not specified, unspecified vomiting type      thiamine (B-1) 100 MG tablet Take 1 tablet (100 mg) by mouth daily for 30 days  Qty: 30 tablet, Refills: 0    Associated Diagnoses: Alcohol withdrawal syndrome without complication (H)         CONTINUE these medications which have NOT CHANGED    Details   ALPRAZolam (XANAX) 0.5 MG tablet Take 0.5 mg by mouth 3 times daily as needed for anxiety      buPROPion (WELLBUTRIN XL) 300 MG 24 hr tablet Take 300 mg by mouth every morning      sertraline (ZOLOFT) 50 MG tablet Take 50 mg by mouth daily      topiramate (TOPAMAX) 100 MG tablet Take 150 mg by mouth At Bedtime      traZODone (DESYREL) 150 MG tablet Take  mg by mouth nightly as needed for sleep           Allergies   No Known Allergies  Data   Most Recent 3 CBC's:  Recent Labs   Lab Test 08/16/22  0621 08/15/22  2155 08/15/22  1452 08/15/22  0752 08/15/22  0752 11/11/20  1715   WBC 5.7  --   --   --  9.3 11.6*   HGB 12.8  12.8 13.0 13.8   < > 15.8* 14.4   *  --   --   --  99 100   *  --   --   --  195 203    < > = values in this interval not displayed.      Most Recent 3 BMP's:  Recent Labs   Lab Test 08/16/22  0621 08/15/22  2155 08/15/22  0752 11/11/20  1715     --  137 134   POTASSIUM 3.6 3.7 3.3* 4.0   CHLORIDE 106  --  94* 103   CO2 28  --  24 26   BUN 7.5  --  13.3 9   CR 0.74  --  0.70 0.80   ANIONGAP 8  --  19* 5   KATHRIN 8.5*  --  10.1* 8.8   *  --  147* 97     Most Recent 2 LFT's:  Recent Labs   Lab Test 08/16/22  0621 08/15/22  0752   * 117*   ALT 43* 29   ALKPHOS 67 89   BILITOTAL 1.1 1.5*     Most Recent INR's and Anticoagulation Dosing History:  Anticoagulation Dose History     Recent Dosing and Labs Latest Ref Rng & Units 8/15/2022    INR 0.85 - 1.15 0.95        Most Recent 3 Troponin's:No lab results found.  Most Recent  Cholesterol Panel:No lab results found.  Most Recent 6 Bacteria Isolates From Any Culture (See EPIC Reports for Culture Details):No lab results found.  Most Recent TSH, T4 and A1c Labs:No lab results found.

## 2022-08-16 NOTE — PLAN OF CARE
Goal Outcome Evaluation:    Pertinent assessments: Assumed care of pt from [id number]. AVSS. A&Ox4. Apical pulse regular. Lungs CTA. Bowel sounds active in all quadrants. Tolerating diet. Voiding spontaneously with adequate output. PIV removed. Denies pain. AVS signed and discussed with patient. No further questions or concerns. Pt discharge home with mother and all belongings.

## 2022-08-16 NOTE — PLAN OF CARE
Goal Outcome Evaluation:      To Do:  End of Shift Summary  For vital signs and complete assessments, please see documentation flowsheets.     Pertinent assessments: Pt A/Ox4. VSS. Denies nausea and SOB. Reported headache but declined intervention. CIWA 6 and 4. Hgb 13. No sign of bleeding noted. Up SBA.   Major Shift Events: uneventful  Treatment Plan: IVF, CIWA and symptom management and serial hgb Q8.  Bedside Nurse: Sudha Madrid RN

## 2022-08-16 NOTE — CONSULTS
"8/16/2022      Spoke with pt via telephone to offer CD services. Pt reports she would like to talk with her therapist before she makes a decision about CD treatment. Pt reports she has attended CD treatment but it was a long time ago. Pt reports she would like to receive CD resources. Email has been sent to pt with CD resources. Pt is able to call Mental Health and Addiction Services Line: [phone] and make an appt through [facility] if she would like an evaluation and referrals to CD treatment after she is discharged.     Home  Alum.ni  https://www.[employer]    Supportive Services   zuuka!  https://www.[employer].org    Alcoholics Anonymous  http://www.aa.org  Community Drug & Alcohol Support Resources   Alcoholics Anonymous   24/7 Phone Line: [phone]   https://ComCrowd.org/   https://DecideQuickneaViralytics.org/   For additional list of online meetings: http://aa-intergroup.org      Narcotics Anonymous [facility]   24 Hour Helpline: 1-515.847.5485   https://www.daysoft.org/   For online meetings, click \"Find a Meeting\" on homepage     [place] [facility]   [address] [place] [address] [address] [place] [address]   Phone: [phone]  [email or web address]      JESSICA Santiago  Phone: [phone]  Email: [email or web address]@[email or web address]  "

## 2022-08-16 NOTE — CONSULTS
Care Management Initial Consult    General Information  Assessment completed with: Patient,    Type of CM/SW Visit: Initial Assessment    Primary Care Provider verified and updated as needed:  (interested in establishing care with a [facility] provider)   Readmission within the last 30 days:        Reason for Consult: care coordination/care conference  Advance Care Planning:            Communication Assessment  Patient's communication style: spoken language (English or Bilingual)    Hearing Difficulty or Deaf: no   Wear Glasses or Blind: yes    Cognitive  Cognitive/Neuro/Behavioral: WDL                      Living Environment:   People in home: alone     Current living Arrangements: apartment      Able to return to prior arrangements: yes       Family/Social Support:  Care provided by: self  Provides care for: no one  Marital Status: Single             Description of Support System: Supportive, Involved    Support Assessment: Adequate family and caregiver support    Current Resources:   Patient receiving home care services: No     Community Resources: None  Equipment currently used at home: none  Supplies currently used at home: None    Employment/Financial:  Employment Status: employed full-time        Financial Concerns: No concerns identified           Lifestyle & Psychosocial Needs:  Social Determinants of Health     Tobacco Use: Not on file   Alcohol Use: Not on file   Financial Resource Strain: Not on file   Food Insecurity: Not on file   Transportation Needs: Not on file   Physical Activity: Not on file   Stress: Not on file   Social Connections: Not on file   Intimate Partner Violence: Not on file   Depression: Not on file   Housing Stability: Not on file       Functional Status:  Prior to admission patient needed assistance:   Dependent ADLs:: Independent  Dependent IADLs:: Independent       Mental Health Status:  Mental Health Status: No Current Concerns       Chemical Dependency Status:  Chemical  Dependency Status: No Current Concerns             Values/Beliefs:  Spiritual, Cultural Beliefs, Temple Practices, Values that affect care: no               Additional Information:   Patient has orders for discharge to home. CTS consulted to assist with scheduling PCP follow up. Met with patient. She confirms she does not have a PCP. Patient would like to establish care with [facility] provider. Assisted with scheduling appointment with Dr. Juan Zapata at [facility] for Tuesday, August 23 at 9:10 am. AVS updated with appointment information.     Patient also has resources and OP MH appointments and OP CD appointments currently scheduled.    Patient discharging home. Family available at bedside and will provide transportation.     Terri Trevizo RN Case Manager  Inpatient Care Coordination   [facility]   [phone]    Terri Trevizo RN

## 2022-08-16 NOTE — CONSULTS
Received consult and reviewed.  Request for medication consultation for psych meds.  Referred to [facility] for consult.  They will contact staff today

## 2022-09-01 ENCOUNTER — OFFICE VISIT (OUTPATIENT)
Dept: FAMILY MEDICINE | Facility: CLINIC | Age: 47
End: 2022-09-01
Payer: COMMERCIAL

## 2022-09-01 VITALS
SYSTOLIC BLOOD PRESSURE: 110 MMHG | TEMPERATURE: 97.8 F | DIASTOLIC BLOOD PRESSURE: 78 MMHG | WEIGHT: 192 LBS | BODY MASS INDEX: 30.99 KG/M2 | OXYGEN SATURATION: 96 % | HEART RATE: 96 BPM | RESPIRATION RATE: 16 BRPM

## 2022-09-01 DIAGNOSIS — E83.42 HYPOMAGNESEMIA: ICD-10-CM

## 2022-09-01 DIAGNOSIS — F41.9 ANXIETY AND DEPRESSION: ICD-10-CM

## 2022-09-01 DIAGNOSIS — F43.23 ADJUSTMENT DISORDER WITH MIXED ANXIETY AND DEPRESSED MOOD: ICD-10-CM

## 2022-09-01 DIAGNOSIS — Z11.4 SCREENING FOR HIV (HUMAN IMMUNODEFICIENCY VIRUS): ICD-10-CM

## 2022-09-01 DIAGNOSIS — R11.2 NAUSEA AND VOMITING, INTRACTABILITY OF VOMITING NOT SPECIFIED, UNSPECIFIED VOMITING TYPE: Primary | ICD-10-CM

## 2022-09-01 DIAGNOSIS — Z23 NEED FOR VACCINATION: ICD-10-CM

## 2022-09-01 DIAGNOSIS — E87.6 HYPOKALEMIA: ICD-10-CM

## 2022-09-01 DIAGNOSIS — F10.10 ALCOHOL ABUSE, CONTINUOUS: ICD-10-CM

## 2022-09-01 DIAGNOSIS — Z13.220 SCREENING FOR HYPERLIPIDEMIA: ICD-10-CM

## 2022-09-01 DIAGNOSIS — Z12.31 VISIT FOR SCREENING MAMMOGRAM: ICD-10-CM

## 2022-09-01 DIAGNOSIS — R39.89 URINE DISCOLORATION: ICD-10-CM

## 2022-09-01 DIAGNOSIS — Z11.59 NEED FOR HEPATITIS C SCREENING TEST: ICD-10-CM

## 2022-09-01 DIAGNOSIS — F10.939 ALCOHOL WITHDRAWAL SYNDROME WITH COMPLICATION (H): ICD-10-CM

## 2022-09-01 DIAGNOSIS — F32.A ANXIETY AND DEPRESSION: ICD-10-CM

## 2022-09-01 DIAGNOSIS — Z12.11 SCREEN FOR COLON CANCER: ICD-10-CM

## 2022-09-01 PROBLEM — F17.200 SMOKER: Status: ACTIVE | Noted: 2018-11-27

## 2022-09-01 LAB
ALBUMIN SERPL-MCNC: 3.2 G/DL (ref 3.4–5)
ALBUMIN UR-MCNC: NEGATIVE MG/DL
ALP SERPL-CCNC: 72 U/L (ref 40–150)
ALT SERPL W P-5'-P-CCNC: 22 U/L (ref 0–50)
ANION GAP SERPL CALCULATED.3IONS-SCNC: 11 MMOL/L (ref 3–14)
APPEARANCE UR: CLEAR
AST SERPL W P-5'-P-CCNC: 31 U/L (ref 0–45)
BACTERIA #/AREA URNS HPF: ABNORMAL /HPF
BASOPHILS # BLD AUTO: 0.1 10E3/UL (ref 0–0.2)
BASOPHILS NFR BLD AUTO: 1 %
BILIRUB SERPL-MCNC: 0.4 MG/DL (ref 0.2–1.3)
BILIRUB UR QL STRIP: NEGATIVE
BUN SERPL-MCNC: 8 MG/DL (ref 7–30)
CALCIUM SERPL-MCNC: 9 MG/DL (ref 8.5–10.1)
CHLORIDE BLD-SCNC: 111 MMOL/L (ref 94–109)
CHOLEST SERPL-MCNC: 132 MG/DL
CO2 SERPL-SCNC: 19 MMOL/L (ref 20–32)
COLOR UR AUTO: YELLOW
CREAT SERPL-MCNC: 0.67 MG/DL (ref 0.52–1.04)
EOSINOPHIL # BLD AUTO: 0.4 10E3/UL (ref 0–0.7)
EOSINOPHIL NFR BLD AUTO: 3 %
ERYTHROCYTE [DISTWIDTH] IN BLOOD BY AUTOMATED COUNT: 12.4 % (ref 10–15)
FASTING STATUS PATIENT QL REPORTED: YES
GFR SERPL CREATININE-BSD FRML MDRD: >90 ML/MIN/1.73M2
GLUCOSE BLD-MCNC: 139 MG/DL (ref 70–99)
GLUCOSE UR STRIP-MCNC: NEGATIVE MG/DL
HCT VFR BLD AUTO: 41.9 % (ref 35–47)
HCV AB SERPL QL IA: NONREACTIVE
HDLC SERPL-MCNC: 48 MG/DL
HGB BLD-MCNC: 14.4 G/DL (ref 11.7–15.7)
HGB UR QL STRIP: NEGATIVE
HIV 1+2 AB+HIV1 P24 AG SERPL QL IA: NONREACTIVE
KETONES UR STRIP-MCNC: NEGATIVE MG/DL
LDLC SERPL CALC-MCNC: 56 MG/DL
LEUKOCYTE ESTERASE UR QL STRIP: NEGATIVE
LYMPHOCYTES # BLD AUTO: 2.3 10E3/UL (ref 0.8–5.3)
LYMPHOCYTES NFR BLD AUTO: 17 %
MAGNESIUM SERPL-MCNC: 2.1 MG/DL (ref 1.6–2.3)
MCH RBC QN AUTO: 33.7 PG (ref 26.5–33)
MCHC RBC AUTO-ENTMCNC: 34.4 G/DL (ref 31.5–36.5)
MCV RBC AUTO: 98 FL (ref 78–100)
MONOCYTES # BLD AUTO: 0.9 10E3/UL (ref 0–1.3)
MONOCYTES NFR BLD AUTO: 6 %
NEUTROPHILS # BLD AUTO: 10.4 10E3/UL (ref 1.6–8.3)
NEUTROPHILS NFR BLD AUTO: 74 %
NITRATE UR QL: NEGATIVE
NONHDLC SERPL-MCNC: 84 MG/DL
PH UR STRIP: 6.5 [PH] (ref 5–7)
PLATELET # BLD AUTO: 342 10E3/UL (ref 150–450)
POTASSIUM BLD-SCNC: 4.1 MMOL/L (ref 3.4–5.3)
PROT SERPL-MCNC: 6.7 G/DL (ref 6.8–8.8)
RBC # BLD AUTO: 4.27 10E6/UL (ref 3.8–5.2)
RBC #/AREA URNS AUTO: ABNORMAL /HPF
SODIUM SERPL-SCNC: 141 MMOL/L (ref 133–144)
SP GR UR STRIP: 1.01 (ref 1–1.03)
SQUAMOUS #/AREA URNS AUTO: ABNORMAL /LPF
TRIGL SERPL-MCNC: 142 MG/DL
UROBILINOGEN UR STRIP-ACNC: 0.2 E.U./DL
WBC # BLD AUTO: 14 10E3/UL (ref 4–11)
WBC #/AREA URNS AUTO: ABNORMAL /HPF

## 2022-09-01 PROCEDURE — 90472 IMMUNIZATION ADMIN EACH ADD: CPT | Performed by: INTERNAL MEDICINE

## 2022-09-01 PROCEDURE — 85025 COMPLETE CBC W/AUTO DIFF WBC: CPT | Performed by: INTERNAL MEDICINE

## 2022-09-01 PROCEDURE — 86803 HEPATITIS C AB TEST: CPT | Performed by: INTERNAL MEDICINE

## 2022-09-01 PROCEDURE — 80061 LIPID PANEL: CPT | Performed by: INTERNAL MEDICINE

## 2022-09-01 PROCEDURE — 99215 OFFICE O/P EST HI 40 MIN: CPT | Mod: 25 | Performed by: INTERNAL MEDICINE

## 2022-09-01 PROCEDURE — 36415 COLL VENOUS BLD VENIPUNCTURE: CPT | Performed by: INTERNAL MEDICINE

## 2022-09-01 PROCEDURE — 90471 IMMUNIZATION ADMIN: CPT | Performed by: INTERNAL MEDICINE

## 2022-09-01 PROCEDURE — 83735 ASSAY OF MAGNESIUM: CPT | Performed by: INTERNAL MEDICINE

## 2022-09-01 PROCEDURE — 90677 PCV20 VACCINE IM: CPT | Performed by: INTERNAL MEDICINE

## 2022-09-01 PROCEDURE — 81001 URINALYSIS AUTO W/SCOPE: CPT | Performed by: INTERNAL MEDICINE

## 2022-09-01 PROCEDURE — 87389 HIV-1 AG W/HIV-1&-2 AB AG IA: CPT | Performed by: INTERNAL MEDICINE

## 2022-09-01 PROCEDURE — 90686 IIV4 VACC NO PRSV 0.5 ML IM: CPT | Performed by: INTERNAL MEDICINE

## 2022-09-01 PROCEDURE — 80053 COMPREHEN METABOLIC PANEL: CPT | Performed by: INTERNAL MEDICINE

## 2022-09-01 RX ORDER — PANTOPRAZOLE SODIUM 40 MG/1
40 TABLET, DELAYED RELEASE ORAL 2 TIMES DAILY
Qty: 60 TABLET | Refills: 0 | Status: SHIPPED | OUTPATIENT
Start: 2022-09-01 | End: 2022-09-13

## 2022-09-01 RX ORDER — TRIAMCINOLONE ACETONIDE 1 MG/G
CREAM TOPICAL
COMMUNITY
Start: 2022-04-18 | End: 2022-10-20

## 2022-09-01 RX ORDER — GABAPENTIN 100 MG/1
CAPSULE ORAL
COMMUNITY
Start: 2022-08-26 | End: 2023-10-26

## 2022-09-01 ASSESSMENT — PAIN SCALES - GENERAL: PAINLEVEL: NO PAIN (0)

## 2022-09-01 NOTE — PROGRESS NOTES
Assessment and Plan  1. Nausea and vomiting, intractability of vomiting not specified, unspecified vomiting type  2. Alcohol withdrawal syndrome with complication (H)  3. Alcohol abuse, continuous  4. Hypokalemia  5. Hypomagnesemia  Recent Hospitalization and discharge 8/15 - 8/16 for Nausea and Emesis 2/2 Acohol use disorder withdrawal , elevated LFTs. She did endorsed in hospital binge drinking session from 8/4 through 8/11 with development of intractable nausea, vomiting and abdominal pain on 8/13 , + hematemesis . CT abdomen pelvis done showed marked fatty infiltration of the liver but no other acute abnormality. Did have Hypokalemia. Hypomagnesemia and repleted. HBG was improving.   - Chronic alcohol every day since 20 yrs old and was progressively worsening , was on rehab in college and was sober for 4 years in between later gotten worser slowly and has been consuming 1 liter Vodka / day but denies any intake since this hospitalization.  - Stable. To continue Pantoprazole BID for 1 month.   - pantoprazole (PROTONIX) 40 MG EC tablet; Take 1 tablet (40 mg) by mouth 2 times daily  Dispense: 60 tablet; Refill: 0  - CBC with platelets and differential; Future  - CBC with platelets and differential  - [facility]  Referral; Future  - Comprehensive metabolic panel (BMP + Alb, Alk Phos, ALT, AST, Total. Bili, TP); Future  - Comprehensive metabolic panel (BMP + Alb, Alk Phos, ALT, AST, Total. Bili, TP)  - Magnesium; Future  - Magnesium    6. Anxiety and depression  7. Adjustment disorder with mixed anxiety and depressed mood  Ongoing problem, Uncontrolled. Pt was in [facility] 2018 for few weeks - [place] [facility]  Psychiatry who pt has been seeing is retired currently. Following [facility] ( yamel sanchez )   [address] , [facility] [address] ; Ph [phone]  - Opting for a referral with  system along with Addiction medicine, requesting for psychotherapy.   - Adult Mental  Health  Referral; Future  - Adult Mental Health  Referral; Future    8. Urine discoloration  - UA with Microscopic reflex to Culture - lab collect; Future  - UA with Microscopic reflex to Culture - lab collect  - Urine Microscopic    9. Visit for screening mammogram  - MA SCREENING DIGITAL BILAT - Future  (s+30); Future    10. Screen for colon cancer  - Colonoscopy Screening  Referral; Future    11. Screening for HIV (human immunodeficiency virus)  - HIV Antigen Antibody Combo; Future  - HIV Antigen Antibody Combo    12. Need for hepatitis C screening test  - Hepatitis C Screen Reflex to HCV RNA Quant and Genotype; Future  - Hepatitis C Screen Reflex to HCV RNA Quant and Genotype    13. Screening for hyperlipidemia  - Lipid panel reflex to direct LDL Non-fasting; Future  - Lipid panel reflex to direct LDL Non-fasting    14. Need for vaccination  - INFLUENZA VACCINE IM > 6 MONTHS VALENT IIV4 (AFLURIA/FLUZONE)      Over 40 minutes spent on reviewing patient chart,  face to face encounter, greater than 50% time spent with plan/cordination of care and documentation as above in my A/P.            Patient Instructions   As discussed , please remain sober from alcohol . Placed referral to Addiction medicine with [facility] discussed. Also placed psychotherapist as requested.     Please do the lab work ordered.     Placed referrals to Colonoscopy and Mammograms which you are due at this time.     =======================    Return in about 4 weeks (around 9/29/2022), or if symptoms worsen or fail to improve, for Preventative Visit , PAP .    Laura Vale MD  [facility] CLAUDIO SOLANOELIZABETH Meraz is a 47 year old presenting for the following health issues:  Hospital F/U      HPI       Hospital Follow-up Visit:    Hospital/Nursing Home/IP Rehab Facility: [facility]  Date of Admission: 8/15/2022  Date of Discharge: 8/16/2022  Reason(s) for Admission: Nausea  with emesis  Suspected Eva-Palmer tear from emesis  Alcohol use disorder with withdrawal  History of depression/anxiety  Obesity  Elevation of LFTs secondary to alcohol use    Was your hospitalization related to COVID-19? No   Problems taking medications regularly:  None  Medication changes since discharge: None  Problems adhering to non-medication therapy:  None    Summary of hospitalization:  [facility] discharge summary reviewed  Diagnostic Tests/Treatments reviewed.  Follow up needed: mental health , addiction medicine , PCP  Other Healthcare Providers Involved in Patient s Care:         Homecare  Update since discharge: improved.       Post Medication Reconciliation Status:        Plan of care communicated with patient           Pt is new to me, last seen [facility]  in 2020.      No Known Allergies     History reviewed. No pertinent past medical history.    History reviewed. No pertinent surgical history.    History reviewed. No pertinent family history.    Social History     Tobacco Use     Smoking status: Current Every Day Smoker     Packs/day: 0.25     Types: Cigarettes     Start date: 1/1/1990     Smokeless tobacco: Never Used     Tobacco comment: 4 Cig/day   Substance Use Topics     Alcohol use: Yes     Comment: 1 liter Vodka / day since 20 yrs age intermittently ; Last drink 8/13/22        Current Outpatient Medications   Medication     ALPRAZolam (XANAX) 0.5 MG tablet     buPROPion (WELLBUTRIN XL) 300 MG 24 hr tablet     folic acid (FOLVITE) 1 MG tablet     gabapentin (NEURONTIN) 100 MG capsule     pantoprazole (PROTONIX) 40 MG EC tablet     sertraline (ZOLOFT) 50 MG tablet     thiamine (B-1) 100 MG tablet     topiramate (TOPAMAX) 100 MG tablet     traZODone (DESYREL) 150 MG tablet     triamcinolone (KENALOG) 0.1 % external cream     No current facility-administered medications for this visit.        Review of Systems   Constitutional, HEENT, cardiovascular, pulmonary, GI, ,  musculoskeletal, neuro, skin, endocrine and psych systems are negative, except as otherwise noted.      Objective    /78 (BP Location: Right arm, Patient Position: Sitting, Cuff Size: Adult Regular)   Pulse 96   Temp 97.8  F (36.6  C) (Tympanic)   Resp 16   Wt 87.1 kg (192 lb)   SpO2 96%   Breastfeeding No   BMI 30.99 kg/m    Body mass index is 30.99 kg/m .  Physical Exam   GENERAL: healthy, alert and no distress  NECK: no adenopathy, no asymmetry, masses, or scars and thyroid normal to palpation  RESP: lungs clear to auscultation - no rales, rhonchi or wheezes  CV: regular rate and rhythm, normal S1 S2, no S3 or S4, no murmur, click or rub, no peripheral edema and peripheral pulses strong  ABDOMEN: soft, nontender, no hepatosplenomegaly, no masses and bowel sounds normal  MS: no gross musculoskeletal defects noted, no edema

## 2022-09-01 NOTE — PATIENT INSTRUCTIONS
As discussed , please remain sober from alcohol . Placed referral to Addiction medicine with [facility] discussed. Also placed psychotherapist as requested.     Please do the lab work ordered.     Placed referrals to Colonoscopy and Mammograms which you are due at this time.     =======================

## 2022-09-02 ENCOUNTER — TELEPHONE (OUTPATIENT)
Dept: FAMILY MEDICINE | Facility: CLINIC | Age: 47
End: 2022-09-02

## 2022-09-02 ENCOUNTER — NURSE TRIAGE (OUTPATIENT)
Dept: FAMILY MEDICINE | Facility: CLINIC | Age: 47
End: 2022-09-02

## 2022-09-02 NOTE — TELEPHONE ENCOUNTER
Provider Response to 2nd Level Triage Request    I have reviewed the RN documentation. My recommendation is:  Virtual Visit Same Day: work patient in on my schedule as an overbook. Ok VIDEO VISIT ( [employer] on her cell phone if she cannot do [facility] ) at 2.30 PM Double book     Thank you,   Laura Vale MD on 9/2/2022 at 12:44 PM

## 2022-09-02 NOTE — TELEPHONE ENCOUNTER
----- Message from Laura Vale MD sent at 9/1/2022 11:18 PM CDT -----  Good news! Your Liver enzymes have normalized.     Your White cell counts are remaining elevated for unspecified reason, which sometimes also happens while in stress , which was also seen in the past Will need follow up.     All your other labs normal, you may see some highlighted which do not have Clinical significance.  Laura Vale MD on 9/1/2022

## 2022-09-02 NOTE — TELEPHONE ENCOUNTER
Patient Contact     S/w pt and relayed message from Dr. Vale, see below. She stated understanding and also mentioned other symptoms she wants to be seen for, see separate telephone triage encounter from 9/2/22 sent to provider.     Gracy LANZA RN  [facility]

## 2022-09-02 NOTE — TELEPHONE ENCOUNTER
Nurse Triage SBAR    Is this a 2nd Level Triage? NO    Situation: Pt experiencing severe itching with a rash on both her arms, back, ,and stomach. She also notes chronic lower back pain. Nurse reached out to pt with result note and she mentioned these symptoms that were not discussed at yesterdays appointment and she wants to see Dr. Vale for.     Background: Future appointment for physical and f/u regarding labs was made for next moth. Rash started this past spring and chronic back pain has been going on for 25 years. Pt has gone to  2 times for the rash and tried steroids and anti itch creams, nothing have worked.     Assessment: see below    Protocol Recommended Disposition:   See in Office Today or Tomorrow    Recommendation: protocol states today or tomorrow, no availability and pt doesn't want to go to [facility] wants to see Dr. Vale. Can same day or other slot be used to schedule pt next week for this? Please review/advise.      Routed to provider    Does the patient meet one of the following criteria for ADS visit consideration? No    Ok to leave detailed vm.     Reason for Disposition    SEVERE local itching persists after 2 days of steroid cream    Additional Information    Negative: Sounds like a life-threatening emergency to the triager    Negative: Athlete's Foot suspected (i.e., itchy rash between the toes)    Negative: Insect bite(s) suspected    Negative: Jock Itch suspected (i.e., itchy rash on inner thighs near genital area)    Negative: Localized lump (or swelling) without redness or rash    Negative: Poison ivy, oak, or sumac contact suspected    Negative: Rash of female genital area (vulva)    Negative: Rash of male genital area (penis or scrotum)    Negative: Redness of immunization site    Negative: Shingles suspected (i.e., painful rash, multiple small blisters grouped together in one area of body; dermatomal distribution)    Negative: Small spot, skin growth, or mole    Negative:  "Wound infection suspected (i.e., pain, spreading redness, or pus; in a cut, puncture, scrape or sutured wound)    Negative: Fever and localized purple or blood-colored spots or dots that are not from injury or friction    Negative: Fever and localized rash is very painful    Negative: Patient sounds very sick or weak to the triager    Negative: Looks like a boil, infected sore, deep ulcer, or other infected rash (spreading redness, pus)    Negative: Painful rash with multiple small blisters grouped together (i.e., dermatomal distribution or 'band' or 'stripe')    Negative: Localized rash is very painful (no fever)    Negative: Localized purple or blood-colored spots or dots that are not from injury or friction (no fever)    Negative: Lyme disease suspected (e.g., bull's-eye rash or tick bite / exposure)    Negative: Patient wants to be seen    Answer Assessment - Initial Assessment Questions  1. APPEARANCE of RASH: \"Describe the rash.\"         Started left shoulder, right and left arm now. Little open sores, not raised, red spots.     2. LOCATION: \"Where is the rash located?\"         Both arms, stomach, back.     3. NUMBER: \"How many spots are there?\"         Several     4. SIZE: \"How big are the spots?\" (Inches, centimeters or compare to size of a coin)         Spots various sizes, about the size of pencil eraser size but some are smaller.     5. ONSET: \"When did the rash start?\"         Started in the spring. Went UC twice and was referred to derm but pt did not have insurance so didn't go. They didn't know what to do.     6. ITCHING: \"Does the rash itch?\" If Yes, ask: \"How bad is the itch?\"  (Scale 0-10; or none, mild, moderate, severe)        Yes, very badly, she can't help but itch it.     7. PAIN: \"Does the rash hurt?\" If Yes, ask: \"How bad is the pain?\"  (Scale 0-10; or none, mild, moderate, severe)     - NONE (0): no pain     - MILD (1-3): doesn't interfere with normal activities      - MODERATE (4-7): " "interferes with normal activities or awakens from sleep      - SEVERE (8-10): excruciating pain, unable to do any normal activities        Yes painful from itching. Severe, excruciating, itch so bad it's painful.     8. OTHER SYMPTOMS: \"Do you have any other symptoms?\" (e.g., fever)        No other symptoms.     9. PREGNANCY: \"Is there any chance you are pregnant?\" \"When was your last menstrual period?\"        NA    Protocols used: RASH OR REDNESS - [id number]    SEE IN OFFICE TODAY OR TOMORROW:   * You need to be examined. Let me give you an appointment.  * IF NO AVAILABLE OFFICE APPOINTMENTS: You need to be seen in an Urgent Care Center. Go there today. A nearby Urgent Care Center is often a good source of care.      CALL BACK IF:  * Rash spreads or becomes worse  * Rash lasts longer than 1 week  * You become worse        Patient/Caregiver understands and will follow care advice? Other, see kartik LANZA RN  [facility]   "

## 2022-09-02 NOTE — TELEPHONE ENCOUNTER
Patient Contact     S/w pt and relayed message from Dr. Vale, unfortunately pt is at work right now and is unable to do visit today. She requests visit next week Wednesday? The same day time at 2:30 would work for her. Is it OK to wait and schedule next Wednesday?    Gracy LANZA RN  [facility]

## 2022-09-03 NOTE — TELEPHONE ENCOUNTER
Yes, please . Can schedule as requested by the patient.     Thank you,  Laura Vale MD on 9/2/2022 at 7:42 PM

## 2022-09-06 NOTE — TELEPHONE ENCOUNTER
Provider Recommendation Follow Up:   Reached patient/caregiver. Informed of provider's recommendations. Patient verbalized understanding and agrees with the plan.     Patient was scheduled for the same day spot on 9/7/22.    Donna Shane RN  [facility] Triage Team

## 2022-09-07 ENCOUNTER — OFFICE VISIT (OUTPATIENT)
Dept: FAMILY MEDICINE | Facility: CLINIC | Age: 47
End: 2022-09-07
Payer: COMMERCIAL

## 2022-09-07 ENCOUNTER — ANCILLARY PROCEDURE (OUTPATIENT)
Dept: GENERAL RADIOLOGY | Facility: CLINIC | Age: 47
End: 2022-09-07
Payer: COMMERCIAL

## 2022-09-07 VITALS
HEART RATE: 100 BPM | BODY MASS INDEX: 30.86 KG/M2 | WEIGHT: 192 LBS | HEIGHT: 66 IN | TEMPERATURE: 97.3 F | OXYGEN SATURATION: 99 % | SYSTOLIC BLOOD PRESSURE: 104 MMHG | DIASTOLIC BLOOD PRESSURE: 75 MMHG

## 2022-09-07 DIAGNOSIS — M54.42 CHRONIC BILATERAL LOW BACK PAIN WITH BILATERAL SCIATICA: ICD-10-CM

## 2022-09-07 DIAGNOSIS — T78.40XA RASH DUE TO ALLERGY: Primary | ICD-10-CM

## 2022-09-07 DIAGNOSIS — R21 RASH DUE TO ALLERGY: Primary | ICD-10-CM

## 2022-09-07 DIAGNOSIS — G89.29 CHRONIC BILATERAL LOW BACK PAIN WITH BILATERAL SCIATICA: ICD-10-CM

## 2022-09-07 DIAGNOSIS — M54.41 CHRONIC BILATERAL LOW BACK PAIN WITH BILATERAL SCIATICA: ICD-10-CM

## 2022-09-07 DIAGNOSIS — S32.009A CLOSED FRACTURE OF LUMBAR VERTEBRA, UNSPECIFIED FRACTURE MORPHOLOGY, UNSPECIFIED LUMBAR VERTEBRAL LEVEL, INITIAL ENCOUNTER (H): ICD-10-CM

## 2022-09-07 DIAGNOSIS — R21 GENERALIZED MACULOPAPULAR RASH: ICD-10-CM

## 2022-09-07 DIAGNOSIS — F17.200 NICOTINE DEPENDENCE, UNCOMPLICATED, UNSPECIFIED NICOTINE PRODUCT TYPE: ICD-10-CM

## 2022-09-07 PROCEDURE — 72100 X-RAY EXAM L-S SPINE 2/3 VWS: CPT | Mod: TC | Performed by: RADIOLOGY

## 2022-09-07 PROCEDURE — 99214 OFFICE O/P EST MOD 30 MIN: CPT | Performed by: INTERNAL MEDICINE

## 2022-09-07 PROCEDURE — 99406 BEHAV CHNG SMOKING 3-10 MIN: CPT | Performed by: INTERNAL MEDICINE

## 2022-09-07 RX ORDER — METHYLPREDNISOLONE 4 MG
TABLET, DOSE PACK ORAL
Qty: 21 TABLET | Refills: 0 | Status: SHIPPED | OUTPATIENT
Start: 2022-09-07 | End: 2022-10-04

## 2022-09-07 ASSESSMENT — PAIN SCALES - GENERAL: PAINLEVEL: WORST PAIN (10)

## 2022-09-07 NOTE — PATIENT INSTRUCTIONS
As discussed , sent in medication for your skin rash. Placed referral to dermatology    Will check X ray of the back, sent in muscle relaxor to your pharmacy and placed referral to Physical therapy.     ============================              How to Quit Smoking  Smoking is a hard habit to break. About 50% of all people who have ever smoked have been able to quit. Most people who still smoke want to quit. Here are some of the best ways to stop smoking.     Keep in mind the health benefits of quitting  The health benefits of quitting start right away. They keep improving the longer you go without smoking. Knowing this can help inspire you to stay on track. These benefits occur at any age. If you are 17 or 70, quitting is a good choice. Some of the health benefits after your last cigarette include:     After 20 minutes: Your blood pressure and pulse return to normal.    After 8 hours: Your oxygen levels return to normal.    After 2 days: Your ability to smell and taste start to improve as damaged nerves regrow.    After 2 to 3 weeks: Your circulation and lung function improve.    After 1 to 9 months: Your coughing, congestion, and shortness of breath decrease. Your tiredness decreases.    After 1 year: Your risk of heart attack decreases by 50%.    After 5 years: Your risk of lung cancer decreases by 50%. Your risk of stroke becomes the same as a nonsmoker s.  Go cold turkey  Most former smokers quit cold turkey. This means stopping all at once. Trying to cut back slowly often doesn't work as well. This may be because it continues the habit of smoking. Also you may inhale more smoke while smoking fewer cigarettes. This leads to the same amount of nicotine in your body.   Get support  Support programs can be a big help, especially for heavy smokers. These groups offer lectures, ways to change behavior, and peer support. Here are some ways to find a support program:     Free national quitline 800-QUIT-NOW  (439.902.4551)    [facility] quit-smoking programs    American Lung Association 496-184-3769    American Cancer Society 297-226-9358  Support at home is important too. Family and friends can offer praise and reassurance. If the smoker in your life finds it hard to quit, encourage them to keep trying.   Try over-the-counter medicine  Nicotine replacement therapy may make it easier to quit. Some aids are available without a prescription. These include a nicotine patch, gum, and lozenges. But it's best to use these under the care of your healthcare provider. The skin patch gives a steady supply of nicotine. Nicotine gum and lozenges give short-time doses of low levels of nicotine. Both methods reduce the craving for cigarettes. If you have nausea, vomiting, dizziness, weakness, or a fast heartbeat, stop using these products. See your provider.   Ask about prescription medicine  After reviewing your smoking patterns and past attempts to quit, your healthcare provider may offer a prescription medicine such as bupropion, varenicline, a nicotine inhaler, or nasal spray. Each has advantages and side effects. Your provider can review these with you.   Keep trying  Most smokers make many attempts at quitting before they succeed. It s important not to give up.   To learn more  For more on how to quit smoking, try these resources:     www.cdc.gov/tobacco/quit_smoking/ 800-QUIT-NOW (797-039-1015)    www.smokefree.gov 877-44U-QUIT (340-334-0400)    www.lung.org/stop-smoking/ 800-LUNGUSA (907-166-3772)  Ann last reviewed this educational content on 12/1/2019 2000-2021 [employer]. All rights reserved. This information is not intended as a substitute for professional medical care. Always follow your healthcare professional's instructions.

## 2022-09-08 ENCOUNTER — TELEPHONE (OUTPATIENT)
Dept: FAMILY MEDICINE | Facility: CLINIC | Age: 47
End: 2022-09-08

## 2022-09-08 DIAGNOSIS — M54.42 CHRONIC BILATERAL LOW BACK PAIN WITH BILATERAL SCIATICA: ICD-10-CM

## 2022-09-08 DIAGNOSIS — G89.29 CHRONIC BILATERAL LOW BACK PAIN WITH BILATERAL SCIATICA: ICD-10-CM

## 2022-09-08 DIAGNOSIS — S32.009A CLOSED FRACTURE OF LUMBAR VERTEBRA, UNSPECIFIED FRACTURE MORPHOLOGY, UNSPECIFIED LUMBAR VERTEBRAL LEVEL, INITIAL ENCOUNTER (H): Primary | ICD-10-CM

## 2022-09-08 DIAGNOSIS — M54.41 CHRONIC BILATERAL LOW BACK PAIN WITH BILATERAL SCIATICA: ICD-10-CM

## 2022-09-08 LAB — RADIOLOGIST FLAGS: ABNORMAL

## 2022-09-08 NOTE — TELEPHONE ENCOUNTER
Spoke w/ pt regarding PCP result note below:    Your X ray is Showing recent Lumbar vertebra fracture . Recommend CT scan ordered as STAT. My TC Shea will reach out to you and help in scheduling this     Pt had no further questions. Pt understands that she will receive future call for scheduling of STAT CT. Pt states it is ok to leave detailed VM with appt time if she does not answer phone.     Smita ELMORE RN  EP Triage

## 2022-09-08 NOTE — TELEPHONE ENCOUNTER
Placed CT Lumbar as per radiology recommendations. Please call the patient and let her know. This is ordered as STAT so that Shea will reach out to patient and help in scheduling this I conveyed to her and also routing this to her. But let the patient know about the critical X ray result as mentioned in her result note by me .     Thank you ,  Laura Vale MD on 9/8/2022 at 9:40 AM

## 2022-09-08 NOTE — RESULT ENCOUNTER NOTE
Your X ray is Showing recent Lumbar vertebra fracture . Recommend CT scan ordered as STAT. My TC Shea will reach out to you and help in scheduling this    Please let me know if you have any questions.  Laura Vale MD on 9/8/2022 at 9:45 AM

## 2022-09-08 NOTE — TELEPHONE ENCOUNTER
[facility] calling to report critical XR Lumbar spine results:    Possible recent fracture involving the L3 vertebral  body requiring CT scan for further evaluation

## 2022-09-08 NOTE — TELEPHONE ENCOUNTER
Called pt to assist with scheduling STAT CT scan. Pt scheduled for Friday 9/9/22 at 12:05pm to have imaging completed.    Shea Lake,  [facility]

## 2022-09-09 ENCOUNTER — ANCILLARY PROCEDURE (OUTPATIENT)
Dept: CT IMAGING | Facility: CLINIC | Age: 47
End: 2022-09-09
Attending: INTERNAL MEDICINE
Payer: COMMERCIAL

## 2022-09-09 DIAGNOSIS — M54.42 CHRONIC BILATERAL LOW BACK PAIN WITH BILATERAL SCIATICA: ICD-10-CM

## 2022-09-09 DIAGNOSIS — S32.009A CLOSED FRACTURE OF LUMBAR VERTEBRA, UNSPECIFIED FRACTURE MORPHOLOGY, UNSPECIFIED LUMBAR VERTEBRAL LEVEL, INITIAL ENCOUNTER (H): ICD-10-CM

## 2022-09-09 DIAGNOSIS — G89.29 CHRONIC BILATERAL LOW BACK PAIN WITH BILATERAL SCIATICA: ICD-10-CM

## 2022-09-09 DIAGNOSIS — M54.41 CHRONIC BILATERAL LOW BACK PAIN WITH BILATERAL SCIATICA: ICD-10-CM

## 2022-09-09 PROCEDURE — 72131 CT LUMBAR SPINE W/O DYE: CPT

## 2022-09-12 ENCOUNTER — MYC MEDICAL ADVICE (OUTPATIENT)
Dept: FAMILY MEDICINE | Facility: CLINIC | Age: 47
End: 2022-09-12

## 2022-09-13 ENCOUNTER — TELEPHONE (OUTPATIENT)
Dept: FAMILY MEDICINE | Facility: CLINIC | Age: 47
End: 2022-09-13

## 2022-09-13 ENCOUNTER — MYC REFILL (OUTPATIENT)
Dept: FAMILY MEDICINE | Facility: CLINIC | Age: 47
End: 2022-09-13

## 2022-09-13 DIAGNOSIS — R11.2 NAUSEA AND VOMITING, INTRACTABILITY OF VOMITING NOT SPECIFIED, UNSPECIFIED VOMITING TYPE: ICD-10-CM

## 2022-09-13 DIAGNOSIS — F10.939 ALCOHOL WITHDRAWAL SYNDROME WITH COMPLICATION (H): ICD-10-CM

## 2022-09-13 NOTE — TELEPHONE ENCOUNTER
Please call the patient and let her know that I received a message from her Insurance stating that CT lumbar may not be covered , I have done Peer to Peer discussion and they have approved with below number for patient reference to tell her insurance if she receives any message.     Called second time again and spoke to Matt SWEENEY who has GIVEN the approval and the   [id number]  is the authorization number to be notified to the patient .     Thank you,  Laura Vale MD on 2022 at 8:17 AM    ====================    Hi Marivel,     Thank you for the information.     I spoke to Caterina Gee RN who has taken the explanation and justification but she states that since CT scan has already been done there may be options -     Provider Services : For post review as the health plan doesn't have capability     Caterina wants me to call - Ph : [phone] ( Provider services line ) - [facility] directly.     Called second time again and spoke to Matt SWEENEY who has GIVEN the approval and the   [id number]  is the authorization number to be notified to the patient .     Thank you,  Laura Vale MD on 2022 at 8:13 AM           ===View-only below this line===  ----- Message -----  From: Marivel Peralta  Sent: 2022   7:13 AM CDT  To: Laura Vale MD  Subject: Peer to Peer requested                           Peer to Peer Request    Patient Name:  Mariya Núñez  :    1975  MRN:    [id number]        Dr. Vale,    The authorization for procedure (description) CT LUMBAR on date of service  has been denied. We were unsuccessful in obtaining approval through clinical review. A [id number] review can be done by calling the insurance/third party authorization vendor with the following information:    Insurance: [employer]  Auth Vendor:  [employer]  Phone:  [phone]  [facility]    Clinicals already Provided  Order:    YES   Visit Notes:   OV   Results:   XRAY LUMBAR   Other:    N/A    Patient  ID: [id number]  Case/Ref #: [id number]    Patient contact: N/A  Patient response:  N/A  Patient Phone #: N/A    Denial Reason: This has not been met because:  You have not completed six weeks of provider directed treatment.  The provider directed treatment did not occur within the last three months.  Symptoms must be the same or worse after treatment to support imaging.  There was no contact with your provider after completing treatment      If you feel you have additional clinical information that could get this denial overturned, please complete the Peer to Peer.  If you choose not to do the P2P, please let me know as soon as possible so that we can reach out to the patient and advise them of their denial.      Thank you,    Marivel  [facility]      =========================

## 2022-09-14 NOTE — TELEPHONE ENCOUNTER
Patient Contact     Attempt # 1     Was call answered?    No  Left non-detailed message to call the clinic back at [phone].      On call back:      -Relay message from Dr. Vale, see below.     Gracy LANZA RN  [facility]

## 2022-09-15 NOTE — TELEPHONE ENCOUNTER
Patient given message and authorization number listed below. Patient verbalizes understanding. Chacha Tena RN

## 2022-09-16 RX ORDER — PANTOPRAZOLE SODIUM 40 MG/1
40 TABLET, DELAYED RELEASE ORAL 2 TIMES DAILY
Qty: 180 TABLET | Refills: 0 | Status: SHIPPED | OUTPATIENT
Start: 2022-09-16 | End: 2022-10-03

## 2022-09-16 NOTE — TELEPHONE ENCOUNTER
Prescription approved per [facility] Refill Protocol.  Dasha Edwards RN  [facility]

## 2022-09-30 ENCOUNTER — MYC MEDICAL ADVICE (OUTPATIENT)
Dept: FAMILY MEDICINE | Facility: CLINIC | Age: 47
End: 2022-09-30

## 2022-09-30 DIAGNOSIS — R21 GENERALIZED MACULOPAPULAR RASH: ICD-10-CM

## 2022-09-30 DIAGNOSIS — R21 RASH DUE TO ALLERGY: ICD-10-CM

## 2022-09-30 DIAGNOSIS — T78.40XA RASH DUE TO ALLERGY: ICD-10-CM

## 2022-10-03 DIAGNOSIS — F10.939 ALCOHOL WITHDRAWAL SYNDROME WITH COMPLICATION (H): ICD-10-CM

## 2022-10-03 RX ORDER — PANTOPRAZOLE SODIUM 40 MG/1
40 TABLET, DELAYED RELEASE ORAL 2 TIMES DAILY
Qty: 180 TABLET | Refills: 0 | Status: SHIPPED | OUTPATIENT
Start: 2022-10-03 | End: 2022-10-20

## 2022-10-03 NOTE — TELEPHONE ENCOUNTER
Prescription approved per [facility] Refill Protocol.  Gracy LANZA RN  [facility]

## 2022-10-03 NOTE — TELEPHONE ENCOUNTER
"We have sent Medrol dosepak to the same pharmacy too in her last OV as seen below. Can you please confirm me by calling pharmacy if she has picked up the medication at all ?     Marvin please change the \" VISIT TYPE \" to Annual physical for her upcoming OV .     Thank you,  Laura Vale MD on 10/3/2022 at 6:53 PM        "

## 2022-10-04 RX ORDER — METHYLPREDNISOLONE 4 MG
TABLET, DOSE PACK ORAL
Qty: 21 TABLET | Refills: 0 | Status: SHIPPED | OUTPATIENT
Start: 2022-10-04 | End: 2022-10-20

## 2022-10-04 NOTE — TELEPHONE ENCOUNTER
Called pharmacy and it was sent to the wrong [facility] pharmacy. The correct pharmacy is pended.    Donna Shane RN  [facility] Triage Team

## 2022-10-11 NOTE — PROGRESS NOTES
SUBJECTIVE:   CC: Mariya is an 47 year old who presents for preventive health visit.      Patient has been advised of split billing requirements and indicates understanding: Yes  History of Present Illness       Back Pain:  She presents for follow up of back pain. Patient's back pain is a chronic problem.  Location of back pain:  Right lower back and left lower back  Description of back pain: sharp, shooting and stabbing  Back pain spreads: right thigh and left thigh    Since patient first noticed back pain, pain is: always present, but gets better and worse  Does back pain interfere with her job:  Yes      Reason for visit:  Rash  uti    She eats 0-1 servings of fruits and vegetables daily.She consumes 1 sweetened beverage(s) daily.She exercises with enough effort to increase her heart rate 9 or less minutes per day.  She exercises with enough effort to increase her heart rate 3 or less days per week.   She is taking medications regularly.      Today's PHQ-2 Score:   PHQ-2 ( 1999 Pf[employer]) 9/1/2022   Q1: Little interest or pleasure in doing things 1   Q2: Feeling down, depressed or hopeless 1   PHQ-2 Score 2   Q1: Little interest or pleasure in doing things Several days   Q2: Feeling down, depressed or hopeless Several days   PHQ-2 Score 2       Abuse: Current or Past (Physical, Sexual or Emotional) - No  Do you feel safe in your environment? Yes    Have you ever done Advance Care Planning? (For example, a Health Directive, POLST, or a discussion with a medical provider or your loved ones about your wishes): No, advance care planning information given to patient to review.  Patient plans to discuss their wishes with loved ones or provider.      Social History     Tobacco Use     Smoking status: Every Day     Packs/day: 0.25     Types: Cigarettes     Start date: 1/1/1990     Smokeless tobacco: Never     Tobacco comments:     4 Cig/day   Substance Use Topics     Alcohol use: Yes     Comment: 1 liter Vodka / day since  20 yrs age intermittently ; Last drink 8/13/22     If you drink alcohol do you typically have >3 drinks per day or >7 drinks per week? Yes    No flowsheet data found.    Reviewed orders with patient.  Reviewed health maintenance and updated orders accordingly - Yes  Lab work is in process  Labs reviewed in EPIC    Breast Cancer Screening:  Any new diagnosis of family breast, ovarian, or bowel cancer? Yes     FHS-7:   Breast CA Risk Assessment (FHS-7) 10/12/2022   Did any of your first-degree relatives have breast or ovarian cancer? No   Did any of your relatives have bilateral breast cancer? Unknown   Did any man in your family have breast cancer? No   Did any woman in your family have breast and ovarian cancer? Unknown   Did any woman in your family have breast cancer before age 50 y? Unknown   Do you have 2 or more relatives with breast and/or ovarian cancer? Unknown   Do you have 2 or more relatives with breast and/or bowel cancer? Unknown     click delete button to remove this line now  Mammogram Screening: Recommended annual mammography  Pertinent mammograms are reviewed under the imaging tab.    History of abnormal Pap smear: NO - age 30- 65 PAP every 3 years recommended     Reviewed and updated as needed this visit by clinical staff   Tobacco  Allergies  Meds  Problems  Med Hx  Surg Hx  Fam Hx          Reviewed and updated as needed this visit by Provider   Tobacco  Allergies  Meds  Problems  Med Hx  Surg Hx  Fam Hx         History reviewed. No pertinent past medical history.   History reviewed. No pertinent surgical history.  OB History   No obstetric history on file.       Review of Systems  CONSTITUTIONAL: NEGATIVE for fever, chills, change in weight  INTEGUMENTARU/SKIN: NEGATIVE for worrisome rashes, moles or lesions  EYES: NEGATIVE for vision changes or irritation  ENT: NEGATIVE for ear, mouth and throat problems  RESP: NEGATIVE for significant cough or SOB  BREAST: NEGATIVE for masses,  "tenderness or discharge  CV: NEGATIVE for chest pain, palpitations or peripheral edema  GI: NEGATIVE for nausea, abdominal pain, heartburn, or change in bowel habits  : NEGATIVE for unusual urinary or vaginal symptoms. Periods are regular.  MUSCULOSKELETAL: NEGATIVE for significant arthralgias or myalgia  NEURO: NEGATIVE for weakness, dizziness or paresthesias  PSYCHIATRIC: NEGATIVE for changes in mood or affect     OBJECTIVE:   /79   Pulse 102   Temp 97.9  F (36.6  C) (Temporal)   Ht 1.676 m (5' 6\")   Wt 81.7 kg (180 lb 3.2 oz)   SpO2 97%   BMI 29.09 kg/m    Physical Exam  GENERAL: healthy, alert and no distress  EYES: Eyes grossly normal to inspection, PERRL and conjunctivae and sclerae normal  HENT: ear canals and TM's normal, nose and mouth without ulcers or lesions  NECK: no adenopathy, no asymmetry, masses, or scars and thyroid normal to palpation  RESP: lungs clear to auscultation - no rales, rhonchi or wheezes  BREAST: normal without masses, tenderness or nipple discharge and no palpable axillary masses or adenopathy  CV: regular rate and rhythm, normal S1 S2, no S3 or S4, no murmur, click or rub, no peripheral edema and peripheral pulses strong  ABDOMEN: soft, nontender, no hepatosplenomegaly, no masses and bowel sounds normal  MS: no gross musculoskeletal defects noted, no edema  SKIN: no suspicious lesions or rashes  NEURO: Normal strength and tone, mentation intact and speech normal  PSYCH: mentation appears normal, affect normal/bright    Diagnostic Test Results:  Labs reviewed in Epic    ASSESSMENT/PLAN:       Assessment and Plan  1. Routine general medical examination at a health care facility  Last seen pt on 9/7/22 for rash which did not respond to topical steroid and was given Medrol dosepak along with dermatology referral at that time. SHe is here for physical. Last labs in 9/1/2022 with normal CMP, Lipid panel but does have leucocytosis possibly due to inflammation at that time.   - " Colonoscopy Screening  Referral; Future    2. Type 2 diabetes mellitus without complication, without long-term current use of insulin (H)  Newly diagnosed on the lab work done today with A1C at 12.5% . Will start on PO medications , placed referral to Diabetic education and Diabetic HM referrals given. Pt notified.   - Albumin Random Urine Quantitative with Creat Ratio; Future  - Adult Eye  Referral; Future  - metFORMIN (GLUCOPHAGE XR) 500 MG 24 hr tablet; Take 2 tablets (1,000 mg) by mouth 2 times daily (with meals)  Dispense: 360 tablet; Refill: 1  - AMB Adult Diabetes Educator Referral; Future  - Albumin Random Urine Quantitative with Creat Ratio    3. Candidiasis of vagina  New problem, diagnosed on the vaginal swab. Medication sent to pharmacy. Pt will need to control her newly diagnosed DMT2 for improvement of this problem.   - fluconazole (DIFLUCAN) 150 MG tablet; Take 1 tablet (150 mg) by mouth once for 1 dose  Dispense: 1 tablet; Refill: 0    2. Alcohol withdrawal syndrome without complication (H)  8. Adjustment disorder with mixed anxiety and depressed mood  Pt states she has been Sober since 8/2022 Hospitalization and not consumed any given her continuous Alcohol intake. Discussed on keeping up her appointments with  Addiction medicine and Psychiatry referrals placed . She states she has been seeing [facility] with her current medication management on Trazodone, Wellbutrin, Zoloft , gabapentin and Topamax.   - TSH with free T4 reflex; Future    3. Closed fracture of lumbar vertebra with routine healing, unspecified fracture morphology, unspecified lumbar vertebral level, subsequent encounter  New problem, given recent X ray and CT spine done as follow up and following Spine surgeon referral placed recently and has appointment today.   - Vitamin D deficiency screening; Future  - DX Hip/Pelvis/Spine; Future    4. Encounter for screening for osteoporosis  Given recent fracture  and alcohol risk factor , will get DEXA for making sure no Osteoporosis.   - DX Hip/Pelvis/Spine; Future    5. Encounter for vitamin deficiency screening  - Vitamin D deficiency screening; Future    6. Neuropathy  New problem, as pt states that her feet are sometimes giving way causing falls. Will check A1C , B12 and to continue current Gabapentin for improvement.   - Vitamin B12; Future    7. IFG (impaired fasting glucose)  - Hemoglobin A1c; Future    9. Vaginal discharge  New problem, given pt symptoms will check Wet prep and treat if positive   - Wet prep - lab collect; Future    10. Dysuria  New problem, given pt symptoms will check UA and treat if positive   - UA Macro with Reflex to Micro and Culture - lab collect; Future    11. Screen for colon cancer  - Colonoscopy Screening  Referral; Future    12. Rash due to allergy  13. Generalized maculopapular rash  Ongoing problem, Uncontrolled. Last seen pt on 9/7/22 for rash which did not respond to topical steroid and was given Medrol dosepak along with dermatology referral at that time  - hydrOXYzine (ATARAX) 25 MG tablet; Take 1 tablet (25 mg) by mouth 3 times daily as needed for itching  Dispense: 30 tablet; Refill: 1    14. Leukocytosis, unspecified type  Unspecified reasons as mentioned above , will recheck to make sure its normalized.   - CBC with platelets and differential; Future    15. IUD (intrauterine device) in place  16. Visit for screening mammogram  Pt opting to follow [facility] with whom she is already scheduled for her PAP and IUD removal. Signed for the orders of Mammogram.   - Mammo Screening digital (bilat); Future    17. Menopause  New problem, with on and off spotting which pt is requesting for menopause hormaone check. Will do as requested.   - Follicle stimulating hormone; Future  - Luteinizing Hormone, Adult; Future    18. High priority for 2019-nCoV vaccine  - COVID-19,PF,PFIZER BOOSTER BIVALENT 12+Yrs    19. Need for  prophylactic vaccination and inoculation against influenza  - INFLUENZA VACCINE IM > 6 MONTHS VALENT IIV4 (AFLURIA/FLUZONE)      Over 40 minutes spent outside the preventive visit on reviewing patient chart,  face to face encounter, greater than 50% time spent with plan/cordination of care and documentation as above in my A/P.            Patient Instructions   As discussed , please keep up your appointments with Spine specialist , Addiction medicine specialist and Psychiatrist on the referral.     Complete the prednisone course given for your skin issues . Added Hydroxyzine which will help in your itching .     Please follow up with [facility] as discussed on your IUD removal and also PAP at that time as opted.     Ordered for DEXA scan     Placed hormone work up as requested.     Please call the below numbers for the referrals placed for Addiction medicine and also Psychiatry  [facility] will call you to coordinate your care as prescribed by your provider. If you don't hear from a representative within 2 business days, please call [phone].       ===============      Preventive Health Recommendations  Female Ages 40 to 49    Yearly exam:     See your health care provider every year in order to  1. Review health changes.   2. Discuss preventive care.    3. Review your medicines if your doctor prescribed any.      Get a Pap test every three years (unless you have an abnormal result and your provider advises testing more often).      If you get Pap tests with HPV test, you only need to test every 5 years, unless you have an abnormal result. You do not need a Pap test if your uterus was removed (hysterectomy) and you have not had cancer.      You should be tested each year for STDs (sexually transmitted diseases), if you're at risk.     Ask your doctor if you should have a mammogram.      Have a colonoscopy (test for colon cancer) if someone in your family has had colon cancer or polyps before age 50.  "      Have a cholesterol test every 5 years.       Have a diabetes test (fasting glucose) after age 45. If you are at risk for diabetes, you should have this test every 3 years.    Shots: Get a flu shot each year. Get a tetanus shot every 10 years.     Nutrition:     Eat at least 5 servings of fruits and vegetables each day.    Eat whole-grain bread, whole-wheat pasta and brown rice instead of white grains and rice.    Get adequate Calcium and Vitamin D.      Lifestyle    Exercise at least 150 minutes a week (an average of 30 minutes a day, 5 days a week). This will help you control your weight and prevent disease.    Limit alcohol to one drink per day.    No smoking.     Wear sunscreen to prevent skin cancer.    See your dentist every six months for an exam and cleaning.    Return in about 6 months (around 4/12/2023), or if symptoms worsen or fail to improve, for If symptoms persist, Follow up of last visit.    Laura Vlae MD  [facility]      Patient has been advised of split billing requirements and indicates understanding: Yes    COUNSELING:  Reviewed preventive health counseling, as reflected in patient instructions  Special attention given to:        Regular exercise       Healthy diet/nutrition       Vision screening       Hearing screening       Immunizations    Vaccinated for: Influenza and Td             Alcohol Use       Contraception       Osteoporosis prevention/bone health       (Sara)menopause management    Estimated body mass index is 29.09 kg/m  as calculated from the following:    Height as of this encounter: 1.676 m (5' 6\").    Weight as of this encounter: 81.7 kg (180 lb 3.2 oz).    Weight management plan: Discussed healthy diet and exercise guidelines    She reports that she has been smoking cigarettes. She started smoking about 32 years ago. She has been smoking an average of .25 packs per day. She has never used smokeless tobacco.  Nicotine/Tobacco Cessation " Plan:   Information offered: Patient not interested at this time      Counseling Resources:  ATP IV Guidelines  Pooled Cohorts Equation Calculator  Breast Cancer Risk Calculator  BRCA-Related Cancer Risk Assessment: FHS-7 Tool  FRAX Risk Assessment  ICSI Preventive Guidelines  Dietary Guidelines for Americans, 2010  USDA's MyPlate  ASA Prophylaxis  Lung CA Screening    Laura Vale MD  [facility]

## 2022-10-12 ENCOUNTER — OFFICE VISIT (OUTPATIENT)
Dept: FAMILY MEDICINE | Facility: CLINIC | Age: 47
End: 2022-10-12
Payer: COMMERCIAL

## 2022-10-12 ENCOUNTER — OFFICE VISIT (OUTPATIENT)
Dept: NEUROSURGERY | Facility: CLINIC | Age: 47
End: 2022-10-12
Attending: INTERNAL MEDICINE
Payer: COMMERCIAL

## 2022-10-12 ENCOUNTER — TELEPHONE (OUTPATIENT)
Dept: FAMILY MEDICINE | Facility: CLINIC | Age: 47
End: 2022-10-12

## 2022-10-12 VITALS
TEMPERATURE: 97.9 F | SYSTOLIC BLOOD PRESSURE: 112 MMHG | WEIGHT: 180.2 LBS | HEIGHT: 66 IN | HEART RATE: 102 BPM | BODY MASS INDEX: 28.96 KG/M2 | OXYGEN SATURATION: 97 % | DIASTOLIC BLOOD PRESSURE: 79 MMHG

## 2022-10-12 VITALS
HEART RATE: 100 BPM | SYSTOLIC BLOOD PRESSURE: 110 MMHG | HEIGHT: 66 IN | BODY MASS INDEX: 30.86 KG/M2 | WEIGHT: 192 LBS | OXYGEN SATURATION: 98 % | DIASTOLIC BLOOD PRESSURE: 80 MMHG

## 2022-10-12 DIAGNOSIS — G62.9 NEUROPATHY: ICD-10-CM

## 2022-10-12 DIAGNOSIS — Z23 HIGH PRIORITY FOR 2019-NCOV VACCINE: ICD-10-CM

## 2022-10-12 DIAGNOSIS — T78.40XA RASH DUE TO ALLERGY: ICD-10-CM

## 2022-10-12 DIAGNOSIS — Z12.11 SCREEN FOR COLON CANCER: ICD-10-CM

## 2022-10-12 DIAGNOSIS — Z23 NEED FOR PROPHYLACTIC VACCINATION AND INOCULATION AGAINST INFLUENZA: ICD-10-CM

## 2022-10-12 DIAGNOSIS — Z13.820 ENCOUNTER FOR SCREENING FOR OSTEOPOROSIS: ICD-10-CM

## 2022-10-12 DIAGNOSIS — Z13.21 ENCOUNTER FOR VITAMIN DEFICIENCY SCREENING: ICD-10-CM

## 2022-10-12 DIAGNOSIS — D72.829 LEUKOCYTOSIS, UNSPECIFIED TYPE: ICD-10-CM

## 2022-10-12 DIAGNOSIS — Z97.5 IUD (INTRAUTERINE DEVICE) IN PLACE: ICD-10-CM

## 2022-10-12 DIAGNOSIS — R21 GENERALIZED MACULOPAPULAR RASH: ICD-10-CM

## 2022-10-12 DIAGNOSIS — F43.23 ADJUSTMENT DISORDER WITH MIXED ANXIETY AND DEPRESSED MOOD: ICD-10-CM

## 2022-10-12 DIAGNOSIS — Z78.0 MENOPAUSE: ICD-10-CM

## 2022-10-12 DIAGNOSIS — E11.9 TYPE 2 DIABETES MELLITUS WITHOUT COMPLICATION, WITHOUT LONG-TERM CURRENT USE OF INSULIN (H): ICD-10-CM

## 2022-10-12 DIAGNOSIS — Z00.00 ROUTINE GENERAL MEDICAL EXAMINATION AT A HEALTH CARE FACILITY: Primary | ICD-10-CM

## 2022-10-12 DIAGNOSIS — R30.0 DYSURIA: ICD-10-CM

## 2022-10-12 DIAGNOSIS — R73.01 IFG (IMPAIRED FASTING GLUCOSE): ICD-10-CM

## 2022-10-12 DIAGNOSIS — S32.009A CLOSED FRACTURE OF LUMBAR VERTEBRA, UNSPECIFIED FRACTURE MORPHOLOGY, UNSPECIFIED LUMBAR VERTEBRAL LEVEL, INITIAL ENCOUNTER (H): ICD-10-CM

## 2022-10-12 DIAGNOSIS — B37.31 CANDIDIASIS OF VAGINA: ICD-10-CM

## 2022-10-12 DIAGNOSIS — F10.930 ALCOHOL WITHDRAWAL SYNDROME WITHOUT COMPLICATION (H): ICD-10-CM

## 2022-10-12 DIAGNOSIS — N89.8 VAGINAL DISCHARGE: ICD-10-CM

## 2022-10-12 DIAGNOSIS — S32.009D CLOSED FRACTURE OF LUMBAR VERTEBRA WITH ROUTINE HEALING, UNSPECIFIED FRACTURE MORPHOLOGY, UNSPECIFIED LUMBAR VERTEBRAL LEVEL, SUBSEQUENT ENCOUNTER: ICD-10-CM

## 2022-10-12 DIAGNOSIS — Z12.31 VISIT FOR SCREENING MAMMOGRAM: ICD-10-CM

## 2022-10-12 DIAGNOSIS — R21 RASH DUE TO ALLERGY: ICD-10-CM

## 2022-10-12 LAB
ALBUMIN UR-MCNC: NEGATIVE MG/DL
APPEARANCE UR: CLEAR
BACTERIA #/AREA URNS HPF: ABNORMAL /HPF
BASOPHILS # BLD AUTO: 0.1 10E3/UL (ref 0–0.2)
BASOPHILS NFR BLD AUTO: 1 %
BILIRUB UR QL STRIP: ABNORMAL
CLUE CELLS: ABNORMAL
COLOR UR AUTO: YELLOW
EOSINOPHIL # BLD AUTO: 0.5 10E3/UL (ref 0–0.7)
EOSINOPHIL NFR BLD AUTO: 4 %
ERYTHROCYTE [DISTWIDTH] IN BLOOD BY AUTOMATED COUNT: 12.1 % (ref 10–15)
FSH SERPL IRP2-ACNC: 35.8 MIU/ML
GLUCOSE UR STRIP-MCNC: 500 MG/DL
HBA1C MFR BLD: 12.3 % (ref 0–5.6)
HCT VFR BLD AUTO: 41.3 % (ref 35–47)
HGB BLD-MCNC: 14.6 G/DL (ref 11.7–15.7)
HGB UR QL STRIP: ABNORMAL
KETONES UR STRIP-MCNC: >=160 MG/DL
LEUKOCYTE ESTERASE UR QL STRIP: NEGATIVE
LH SERPL-ACNC: 24.1 MIU/ML
LYMPHOCYTES # BLD AUTO: 3.6 10E3/UL (ref 0.8–5.3)
LYMPHOCYTES NFR BLD AUTO: 30 %
MCH RBC QN AUTO: 32 PG (ref 26.5–33)
MCHC RBC AUTO-ENTMCNC: 35.4 G/DL (ref 31.5–36.5)
MCV RBC AUTO: 91 FL (ref 78–100)
MONOCYTES # BLD AUTO: 0.9 10E3/UL (ref 0–1.3)
MONOCYTES NFR BLD AUTO: 8 %
NEUTROPHILS # BLD AUTO: 6.8 10E3/UL (ref 1.6–8.3)
NEUTROPHILS NFR BLD AUTO: 57 %
NITRATE UR QL: NEGATIVE
PH UR STRIP: 5.5 [PH] (ref 5–7)
PLATELET # BLD AUTO: 266 10E3/UL (ref 150–450)
RBC # BLD AUTO: 4.56 10E6/UL (ref 3.8–5.2)
RBC #/AREA URNS AUTO: ABNORMAL /HPF
SP GR UR STRIP: 1.01 (ref 1–1.03)
SQUAMOUS #/AREA URNS AUTO: ABNORMAL /LPF
TRICHOMONAS, WET PREP: ABNORMAL
TSH SERPL DL<=0.005 MIU/L-ACNC: 3.18 MU/L (ref 0.4–4)
UROBILINOGEN UR STRIP-ACNC: 0.2 E.U./DL
VIT B12 SERPL-MCNC: 1667 PG/ML (ref 232–1245)
WBC # BLD AUTO: 11.9 10E3/UL (ref 4–11)
WBC #/AREA URNS AUTO: ABNORMAL /HPF
WBC'S/HIGH POWER FIELD, WET PREP: ABNORMAL
YEAST #/AREA URNS HPF: ABNORMAL /HPF
YEAST, WET PREP: PRESENT

## 2022-10-12 PROCEDURE — 87210 SMEAR WET MOUNT SALINE/INK: CPT | Performed by: INTERNAL MEDICINE

## 2022-10-12 PROCEDURE — 99396 PREV VISIT EST AGE 40-64: CPT | Mod: 25 | Performed by: INTERNAL MEDICINE

## 2022-10-12 PROCEDURE — 84443 ASSAY THYROID STIM HORMONE: CPT | Performed by: INTERNAL MEDICINE

## 2022-10-12 PROCEDURE — 85025 COMPLETE CBC W/AUTO DIFF WBC: CPT | Performed by: INTERNAL MEDICINE

## 2022-10-12 PROCEDURE — 83001 ASSAY OF GONADOTROPIN (FSH): CPT | Performed by: INTERNAL MEDICINE

## 2022-10-12 PROCEDURE — 83002 ASSAY OF GONADOTROPIN (LH): CPT | Performed by: INTERNAL MEDICINE

## 2022-10-12 PROCEDURE — 81001 URINALYSIS AUTO W/SCOPE: CPT | Performed by: INTERNAL MEDICINE

## 2022-10-12 PROCEDURE — 99214 OFFICE O/P EST MOD 30 MIN: CPT | Mod: 25 | Performed by: INTERNAL MEDICINE

## 2022-10-12 PROCEDURE — 36415 COLL VENOUS BLD VENIPUNCTURE: CPT | Performed by: INTERNAL MEDICINE

## 2022-10-12 PROCEDURE — 99203 OFFICE O/P NEW LOW 30 MIN: CPT | Performed by: PHYSICIAN ASSISTANT

## 2022-10-12 PROCEDURE — 82607 VITAMIN B-12: CPT | Performed by: INTERNAL MEDICINE

## 2022-10-12 PROCEDURE — 82043 UR ALBUMIN QUANTITATIVE: CPT | Performed by: INTERNAL MEDICINE

## 2022-10-12 PROCEDURE — 82306 VITAMIN D 25 HYDROXY: CPT | Performed by: INTERNAL MEDICINE

## 2022-10-12 PROCEDURE — 90471 IMMUNIZATION ADMIN: CPT | Performed by: INTERNAL MEDICINE

## 2022-10-12 PROCEDURE — 0124A COVID-19,PF,PFIZER BOOSTER BIVALENT: CPT | Performed by: INTERNAL MEDICINE

## 2022-10-12 PROCEDURE — 90714 TD VACC NO PRESV 7 YRS+ IM: CPT | Performed by: INTERNAL MEDICINE

## 2022-10-12 PROCEDURE — 90686 IIV4 VACC NO PRSV 0.5 ML IM: CPT | Performed by: INTERNAL MEDICINE

## 2022-10-12 PROCEDURE — 90472 IMMUNIZATION ADMIN EACH ADD: CPT | Performed by: INTERNAL MEDICINE

## 2022-10-12 PROCEDURE — 83036 HEMOGLOBIN GLYCOSYLATED A1C: CPT | Performed by: INTERNAL MEDICINE

## 2022-10-12 PROCEDURE — 91312 COVID-19,PF,PFIZER BOOSTER BIVALENT: CPT | Performed by: INTERNAL MEDICINE

## 2022-10-12 RX ORDER — HYDROXYZINE HYDROCHLORIDE 25 MG/1
25 TABLET, FILM COATED ORAL 3 TIMES DAILY PRN
Qty: 30 TABLET | Refills: 1 | Status: SHIPPED | OUTPATIENT
Start: 2022-10-12 | End: 2022-11-03

## 2022-10-12 RX ORDER — METFORMIN HCL 500 MG
1000 TABLET, EXTENDED RELEASE 24 HR ORAL 2 TIMES DAILY WITH MEALS
Qty: 360 TABLET | Refills: 1 | Status: SHIPPED | OUTPATIENT
Start: 2022-10-12 | End: 2023-03-28

## 2022-10-12 RX ORDER — FLUCONAZOLE 150 MG/1
150 TABLET ORAL ONCE
Qty: 1 TABLET | Refills: 0 | Status: SHIPPED | OUTPATIENT
Start: 2022-10-12 | End: 2022-10-12

## 2022-10-12 ASSESSMENT — PAIN SCALES - GENERAL
PAINLEVEL: SEVERE PAIN (7)
PAINLEVEL: MODERATE PAIN (4)

## 2022-10-12 NOTE — PATIENT INSTRUCTIONS
As discussed , please keep up your appointments with Spine specialist , Addiction medicine specialist and Psychiatrist on the referral.     Complete the prednisone course given for your skin issues . Added Hydroxyzine which will help in your itching .     Please follow up with [facility] clinic as discussed on your IUD removal and also PAP at that time as opted.     Ordered for DEXA scan     Placed hormone work up as requested.     Please call the below numbers for the referrals placed for Addiction medicine and also Psychiatry  [facility] will call you to coordinate your care as prescribed by your provider. If you don't hear from a representative within 2 business days, please call [phone].       ===============      Preventive Health Recommendations  Female Ages 40 to 49    Yearly exam:   See your health care provider every year in order to  Review health changes.   Discuss preventive care.    Review your medicines if your doctor prescribed any.    Get a Pap test every three years (unless you have an abnormal result and your provider advises testing more often).    If you get Pap tests with HPV test, you only need to test every 5 years, unless you have an abnormal result. You do not need a Pap test if your uterus was removed (hysterectomy) and you have not had cancer.    You should be tested each year for STDs (sexually transmitted diseases), if you're at risk.   Ask your doctor if you should have a mammogram.    Have a colonoscopy (test for colon cancer) if someone in your family has had colon cancer or polyps before age 50.     Have a cholesterol test every 5 years.     Have a diabetes test (fasting glucose) after age 45. If you are at risk for diabetes, you should have this test every 3 years.    Shots: Get a flu shot each year. Get a tetanus shot every 10 years.     Nutrition:   Eat at least 5 servings of fruits and vegetables each day.  Eat whole-grain bread, whole-wheat pasta and brown rice instead  of white grains and rice.  Get adequate Calcium and Vitamin D.      Lifestyle  Exercise at least 150 minutes a week (an average of 30 minutes a day, 5 days a week). This will help you control your weight and prevent disease.  Limit alcohol to one drink per day.  No smoking.   Wear sunscreen to prevent skin cancer.  See your dentist every six months for an exam and cleaning.

## 2022-10-12 NOTE — PROGRESS NOTES
Neurosurgery Consult    HPI    Ms. Núñez is a 47-year-old female who presents to clinic for evaluation of back pain.  She recently had a lumbar CT scan which demonstrated subacute fractures involving the L3, L4 and L5 vertebral bodies.  There has been no changes on imaging dating back to August 15, 2022.  Patient states she has had back pain for 20 years.  She denies constant radicular symptoms but does have occasional pain shooting down her legs.  She states she has increased back pain when she stands or walks for a long time.  She denies any bowel or bladder symptoms or saddle anesthesia denies numbness in her lower extremities.    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  No change dating back to 08/15/2022.  2.  Subacute fractures involving the L3, L4 and L5 vertebral bodies.  3.  Associated mild height loss of the L3 superior endplate.  4.  10 degree levoscoliosis apex L4-L5.    Assessment    Back pain    Plan:      I recommend the patient obtain a lumbar MRI without contrast to evaluate the acuity of the compression fractures in her back, and I will follow-up with her with the results when they are available.    Addendum 10/24/2022  Lumbar MRI reviewed, no acute fractures noted, degenerative changes are stable, recommend continue conservative therapies and follow-up as needed.  Contacted the patient and left a message for her to call back to clinic to  discuss further.

## 2022-10-12 NOTE — TELEPHONE ENCOUNTER
Patient Contact     Attempt # 1     Was call answered?    Yes  Pt is currently at work and cannot talk at this time. She will call the clinic back today when finished with work. She was given clinic number.      On call back:      -Relay message from Dr. Vale, see below.     Gracy LANZA RN  [facility]

## 2022-10-12 NOTE — TELEPHONE ENCOUNTER
"----- Message from Laura Vale MD sent at 10/12/2022  1:06 PM CDT -----  Please call the patient and let her know that she has \" Newly diagnosed Diabetes mellitus \" at 12.3 % which severely high than normal of 6.5 and possibly causing your symptoms. At this levels we normally start off on Insulin for treating diabetes. Since given your age and you stopped alcohol consumption completely I am OK to try Oral Metformin oral medication which I sent to your pharmacy ( May cause diarrhea as side effect initially but resolves in 1-2 weeks ) , Placed referral to Eye doctor and Diabetic education which you will need to keep up her appointments.     Thank you,  Laura Vale MD on 10/12/2022 at 1:05 PM   "

## 2022-10-12 NOTE — NURSING NOTE
"Mariya Núñez is a 47 year old female who presents for:  Chief Complaint   Patient presents with     Consult     Closed Fracture of Lumbar Vertebra        Initial Vitals:  /80   Pulse 100   Ht 5' 6\" (1.676 m)   Wt 192 lb (87.1 kg)   SpO2 98%   BMI 30.99 kg/m   Estimated body mass index is 30.99 kg/m  as calculated from the following:    Height as of this encounter: 5' 6\" (1.676 m).    Weight as of this encounter: 192 lb (87.1 kg).. Body surface area is 2.01 meters squared. BP completed using cuff size: regular  Data Unavailable        Kimberley Gaxiola  "

## 2022-10-12 NOTE — TELEPHONE ENCOUNTER
Pt called back providers message reviewed. She has scheduled future follow up visit. Pt was given phone number for diabetes educator. She is requesting a lab letter with results. Asked if letter can be mailed to her. She is also requesting advise from PCP if she should have MRI lumbar spine. States she was seen today with neurosurgeon and he did not seem to worried about it. Please advice. Ok to leave a detailed voice message with providers advice.

## 2022-10-12 NOTE — LETTER
10/12/2022         RE: Mariya Núñez  [address]  [place] [id number]        Dear Colleague,    Thank you for referring your patient, Mariya Núñez, to the [facility]. Please see a copy of my visit note below.    Neurosurgery Consult    HPI    Ms. Núñez is a 47-year-old female who presents to clinic for evaluation of back pain.  She recently had a lumbar CT scan which demonstrated subacute fractures involving the L3, L4 and L5 vertebral bodies.  There has been no changes on imaging dating back to August 15, 2022.  Patient states she has had back pain for 20 years.  She denies constant radicular symptoms but does have occasional pain shooting down her legs.  She states she has increased back pain when she stands or walks for a long time.  She denies any bowel or bladder symptoms or saddle anesthesia denies numbness in her lower extremities.    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  No change dating back to 08/15/2022.  2.  Subacute fractures involving the L3, L4 and L5 vertebral bodies.  3.  Associated mild height loss of the L3 superior endplate.  4.  10 degree levoscoliosis apex L4-L5.    Assessment    Back pain    Plan:      I recommend the patient obtain a lumbar MRI without contrast to evaluate the acuity of the compression fractures in her back, and I will follow-up with her with the results when they  are available.          Again, thank you for allowing me to participate in the care of your patient.        Sincerely,        Sue Amador PA-C

## 2022-10-13 ENCOUNTER — TELEPHONE (OUTPATIENT)
Dept: FAMILY MEDICINE | Facility: CLINIC | Age: 47
End: 2022-10-13

## 2022-10-13 LAB
CREAT UR-MCNC: 74 MG/DL
DEPRECATED CALCIDIOL+CALCIFEROL SERPL-MC: 32 UG/L (ref 20–75)
MICROALBUMIN UR-MCNC: <5 MG/L
MICROALBUMIN/CREAT UR: NORMAL MG/G{CREAT}

## 2022-10-13 NOTE — TELEPHONE ENCOUNTER
If spine specialist not worried on her back, MRI is not indicated     Please schedule 3 months follow up for her diabetes with me as we are booked out far for 2months    Thank you,  Laura Vale MD on 10/13/2022

## 2022-10-13 NOTE — TELEPHONE ENCOUNTER
Patient Contact    Attempt # 1    Was call answered?  No.  Left message on voicemail with information to call triage back at [phone], option 2. Pt also told the message is viewable on [facility]    On call back:  PCP result note below    Smita ELMORE RN  EP Triage

## 2022-10-13 NOTE — TELEPHONE ENCOUNTER
Diabetes Education Scheduling Outreach #1:    Call to patient to schedule. Left message with phone number to call to schedule.    Also sent [employer] message for second attempt. Requested patient to call to schedule.    Gina Jacobs OnCall  Diabetes and Nutrition Scheduling

## 2022-10-14 ENCOUNTER — HOSPITAL ENCOUNTER (OUTPATIENT)
Dept: MRI IMAGING | Facility: CLINIC | Age: 47
Discharge: HOME | End: 2022-10-14
Attending: PHYSICIAN ASSISTANT | Admitting: PHYSICIAN ASSISTANT
Payer: COMMERCIAL

## 2022-10-14 DIAGNOSIS — S32.009A CLOSED FRACTURE OF LUMBAR VERTEBRA, UNSPECIFIED FRACTURE MORPHOLOGY, UNSPECIFIED LUMBAR VERTEBRAL LEVEL, INITIAL ENCOUNTER (H): ICD-10-CM

## 2022-10-14 PROCEDURE — 72148 MRI LUMBAR SPINE W/O DYE: CPT

## 2022-10-16 ENCOUNTER — MYC MEDICAL ADVICE (OUTPATIENT)
Dept: FAMILY MEDICINE | Facility: CLINIC | Age: 47
End: 2022-10-16

## 2022-10-18 NOTE — TELEPHONE ENCOUNTER
Please schedule telephone visit to discuss on her MRI results done by her Spine specialist. OK to double book on one of the days in this week on my schedule.     Thank you,  Laura Vale MD on 10/17/2022 at 8:06 PM

## 2022-10-19 NOTE — TELEPHONE ENCOUNTER
Attempt #1    Called pt to assist with scheduling a telephone visit to discuss MRI results. No answer, left voice message for pt to return call. Mychart response sent to pt as well.    [place],  [facility]

## 2022-10-20 ENCOUNTER — OFFICE VISIT (OUTPATIENT)
Dept: MIDWIFE SERVICES | Facility: CLINIC | Age: 47
End: 2022-10-20
Payer: COMMERCIAL

## 2022-10-20 VITALS
SYSTOLIC BLOOD PRESSURE: 108 MMHG | BODY MASS INDEX: 31.11 KG/M2 | DIASTOLIC BLOOD PRESSURE: 66 MMHG | WEIGHT: 186.7 LBS | HEIGHT: 65 IN

## 2022-10-20 DIAGNOSIS — N89.8 VAGINAL DISCHARGE: ICD-10-CM

## 2022-10-20 DIAGNOSIS — Z12.4 PAP SMEAR FOR CERVICAL CANCER SCREENING: ICD-10-CM

## 2022-10-20 DIAGNOSIS — R30.0 DYSURIA: ICD-10-CM

## 2022-10-20 DIAGNOSIS — Z30.432 ENCOUNTER FOR REMOVAL OF INTRAUTERINE CONTRACEPTIVE DEVICE: Primary | ICD-10-CM

## 2022-10-20 LAB
CLUE CELLS: ABNORMAL
TRICHOMONAS, WET PREP: ABNORMAL
WBC'S/HIGH POWER FIELD, WET PREP: ABNORMAL
YEAST, WET PREP: ABNORMAL

## 2022-10-20 PROCEDURE — 58301 REMOVE INTRAUTERINE DEVICE: CPT | Performed by: ADVANCED PRACTICE MIDWIFE

## 2022-10-20 PROCEDURE — G0145 SCR C/V CYTO,THINLAYER,RESCR: HCPCS | Performed by: ADVANCED PRACTICE MIDWIFE

## 2022-10-20 PROCEDURE — 87210 SMEAR WET MOUNT SALINE/INK: CPT | Performed by: ADVANCED PRACTICE MIDWIFE

## 2022-10-20 PROCEDURE — 81003 URINALYSIS AUTO W/O SCOPE: CPT | Performed by: ADVANCED PRACTICE MIDWIFE

## 2022-10-20 PROCEDURE — 81015 MICROSCOPIC EXAM OF URINE: CPT | Performed by: ADVANCED PRACTICE MIDWIFE

## 2022-10-20 PROCEDURE — 87624 HPV HI-RISK TYP POOLED RSLT: CPT | Performed by: ADVANCED PRACTICE MIDWIFE

## 2022-10-20 PROCEDURE — 87086 URINE CULTURE/COLONY COUNT: CPT | Performed by: ADVANCED PRACTICE MIDWIFE

## 2022-10-20 PROCEDURE — 99203 OFFICE O/P NEW LOW 30 MIN: CPT | Mod: 25 | Performed by: ADVANCED PRACTICE MIDWIFE

## 2022-10-20 NOTE — TELEPHONE ENCOUNTER
Pt returning call. Virtual video visit appt made for Friday 10/21/22 at 9:10am (check-in) to see Dr. Vale.    Shea Lake,  [facility]

## 2022-10-20 NOTE — PATIENT INSTRUCTIONS
Preventive Health Recommendations  Female Ages 40-50    Yearly exam:   See your health care provider every year in order to  Review health changes   Discuss preventive care    Review your medicines if your provider prescribed any    Get a Pap test every five years with co-testing for HPV (unless you have an abnormal result and your provider advises testing more often)     You do not need a Pap test if your uterus was removed (hysterectomy) and you have not had cancer    You should be tested each year for STD's (sexually transmitted diseases) if you are at risk    Talk with your provider about starting mammogram screenings for breast cancer and how often you should be screened    Have a cholesterol test every 3-5 years     Have a diabetes test (fasting glucose) after age 45. If you are at risk for diabetes, you should have this test every 3 years    You may begin to experience some changes in your menstrual cycle as you age, you may also begin to have some symptoms of perimenopause such as hot flashes. Women typically go through menopause anytime between 45-55 years of age.    Shots: Get a flu shot each year. Get a tetanus shot every 10 years.     Nutrition:   Eat at least 5 servings of fruits and vegetables each day  Eat REAL food, stay away from processed foods  Eat whole-grain bread, whole-wheat pasta and brown rice instead of white grains and rice  For bone health:  Eat calcium-rich foods or take calcium pills (500 to 600 mg) twice a day with food (1200 mg per day). Also take vitamin D (1,000-3,000 IUs) each day.     Lifestyle  Exercise at least 150 minutes a week (an average of 30 minutes a day, 5 days a week). This will help you control your weight and prevent disease.  Limit alcohol to one drink per day  No smoking   Wear sunscreen to prevent skin cancer  See your dentist every six months for an exam and cleaning  Weight bearing exercise to encourage good bone health prevent osteoporosis

## 2022-10-20 NOTE — PROGRESS NOTES
SUBJECTIVE:                                                   Mariya Núñez is a 47 year old who presents to clinic today for the following health issue(s):  Patient presents with:  Contraception: Mirena IUD inserted on 10/12/2017  Women's Health: Pap last on 2017: ASCUS/HPV positive  10/12/2017: Colposcopy, hysteroscopy, polypectomy, and IUD insertion under anesthesia: Pathology showed a benign endometrial polyp with fragments of proliferative endometrium. Colposcopic biopsies showed HPV effect and mild inflammation. ECC was negative.  Urinary Problem: Possible UTI or yeast infection.      HPI:  Mariya presents today to have her Mirena IUD removed. Had IUD placed in 10/2017. The IUD was placed due to heavy menses due to what she thought was fibroids.  She is choosing not to have it replaced as she is not sexually active and recent lab tests indicate she may be postmenopausal. She is also having vaginal itching and burning, and dysuria. Was treated with one pill of diflucan for yeast on 10/12/22. Symptoms have not improved. She is also due for a pap.     No LMP recorded. (Menstrual status: IUD).  Menstrual History: amenorrhea x 1 year, prior to that did have occ'l menses with Mirena  Patient is not sexually active  .    Last PHQ-9 score on record = No flowsheet data found.  Last GAD7 score on record = No flowsheet data found.  Alcohol Score = Just stopped drinking recently.     Problem list and histories reviewed & adjusted, as indicated.  Additional history: as documented.    Patient Active Problem List   Diagnosis     Hypokalemia     Hypomagnesemia     Alcohol withdrawal syndrome without complication (H)     Hematemesis, presence of nausea not specified     Alcohol abuse, continuous     Adjustment disorder with mixed anxiety and depressed mood     Smoker     Closed fracture of lumbar vertebra (H)     Diabetes mellitus, type 2 (H)     History reviewed. No pertinent surgical history.   Social History  "    Tobacco Use     Smoking status: Every Day     Packs/day: 0.25     Types: Cigarettes     Start date: 1/1/1990     Smokeless tobacco: Never     Tobacco comments:     4 Cig/day   Substance Use Topics     Alcohol use: Not Currently     Comment: 1 liter Vodka / day since 20 yrs age intermittently ; Last drink 8/13/22           Current Outpatient Medications   Medication Sig     aspirin (ASA) 325 MG EC tablet Take 325 mg by mouth every 6 hours as needed for moderate pain     buPROPion (WELLBUTRIN XL) 300 MG 24 hr tablet Take 300 mg by mouth every morning     gabapentin (NEURONTIN) 100 MG capsule      hydrOXYzine (ATARAX) 25 MG tablet Take 1 tablet (25 mg) by mouth 3 times daily as needed for itching     metFORMIN (GLUCOPHAGE XR) 500 MG 24 hr tablet Take 2 tablets (1,000 mg) by mouth 2 times daily (with meals)     Multiple Vitamin (MULTIVITAMIN ADULT PO)      sertraline (ZOLOFT) 50 MG tablet Take 50 mg by mouth daily     topiramate (TOPAMAX) 100 MG tablet Take 150 mg by mouth At Bedtime     traZODone (DESYREL) 150 MG tablet Take  mg by mouth nightly as needed for sleep     No current facility-administered medications for this visit.     No Known Allergies    ROS:  CONSTITUTIONAL: NEGATIVE for fever, chills, change in weight  GI: NEGATIVE for nausea, abdominal pain, heartburn, or change in bowel habits  : NEGATIVE  No vaginal bleeding.  POSITIVE for vaginal irritation and dysuria  NEURO: NEGATIVE for weakness, dizziness or paresthesias  HEME/ALLERGY/IMMUNE: NEGATIVE for bleeding problems  PSYCHIATRIC: NEGATIVE for changes in mood or affect     OBJECTIVE:     /66 (BP Location: Right arm, Cuff Size: Adult Regular)   Ht 1.651 m (5' 5\")   Wt 84.7 kg (186 lb 11.2 oz)   BMI 31.07 kg/m    Body mass index is 31.07 kg/m .    PHYSICAL EXAM:  Constitutional:  Appearance: Well nourished, well developed alert, in no acute distress  Chest:  Respiratory Effort:  Breathing unlabored.   Neurologic:  Mental Status:  " Oriented X3.  Normal strength and tone, sensory exam grossly normal, mentation intact and speech normal.    Psychiatric:  Mentation appears normal and affect normal/bright.    Pelvic Exam:  Vulva: No external lesions, normal hair distribution, no adenopathy  Vagina: Moist, pink, no abnormal discharge, well rugated, no lesions, swab collected for wet prep  Cervix: Pap smear is taken, pink, no visible lesions, IUD strings visualized at os  Uterus: deferred by patient   Ovaries: deferred by patient   Rectal exam: Deferred    Procedure:  Strings grasped with ring forceps and IUD easily removed, intact.   Patient tolerated well, no complications.    Exam very uncomfortable for patient. She chose to defer the bimanual exam.    In-Clinic Test Results:  No results found for this or any previous visit (from the past 24 hour(s)).    ASSESSMENT/PLAN:                                                        ICD-10-CM    1. Encounter for removal of intrauterine contraceptive device  Z30.432 REMOVE INTRAUTERINE DEVICE      2. Vaginal discharge  N89.8 Urine Culture     Wet preparation     UA with Microscopic     CANCELED: UA with Microscopic      3. Dysuria  R30.0 Urine Culture     UA with Microscopic     CANCELED: UA with Microscopic      4. Pap smear for cervical cancer screening  Z12.4 Pap screen with HPV - recommended age 30 - 65 years          PLAN:    1. IUD removed without difficulty. Patient chose not to have replaced.  2. Labs pending, and not available by the end of the visit. Will contact patient with results when available.  3.   Labs pending, and not available by the end of the visit. Will contact patient with results when available.  4. Pap pending. Will advise patient of results when available.    TREVON Ambrose, CNM

## 2022-10-21 ENCOUNTER — VIRTUAL VISIT (OUTPATIENT)
Dept: FAMILY MEDICINE | Facility: CLINIC | Age: 47
End: 2022-10-21
Payer: COMMERCIAL

## 2022-10-21 DIAGNOSIS — Z12.11 SCREEN FOR COLON CANCER: ICD-10-CM

## 2022-10-21 DIAGNOSIS — S32.009D CLOSED FRACTURE OF LUMBAR VERTEBRA WITH ROUTINE HEALING, UNSPECIFIED FRACTURE MORPHOLOGY, UNSPECIFIED LUMBAR VERTEBRAL LEVEL, SUBSEQUENT ENCOUNTER: Primary | ICD-10-CM

## 2022-10-21 LAB
ALBUMIN UR-MCNC: NEGATIVE MG/DL
APPEARANCE UR: CLEAR
BACTERIA #/AREA URNS HPF: ABNORMAL /HPF
BILIRUB UR QL STRIP: ABNORMAL
COLOR UR AUTO: YELLOW
GLUCOSE UR STRIP-MCNC: NEGATIVE MG/DL
HGB UR QL STRIP: NEGATIVE
KETONES UR STRIP-MCNC: 40 MG/DL
LEUKOCYTE ESTERASE UR QL STRIP: NEGATIVE
NITRATE UR QL: NEGATIVE
PH UR STRIP: 6.5 [PH] (ref 5–7)
RBC #/AREA URNS AUTO: ABNORMAL /HPF
SP GR UR STRIP: >=1.03 (ref 1–1.03)
SQUAMOUS #/AREA URNS AUTO: ABNORMAL /LPF
UROBILINOGEN UR STRIP-ACNC: 0.2 E.U./DL
WBC #/AREA URNS AUTO: ABNORMAL /HPF

## 2022-10-21 PROCEDURE — 99214 OFFICE O/P EST MOD 30 MIN: CPT | Mod: 95 | Performed by: INTERNAL MEDICINE

## 2022-10-21 RX ORDER — CHOLECALCIFEROL (VITAMIN D3) 50 MCG
1 TABLET ORAL DAILY
Qty: 90 TABLET | Refills: 1 | Status: SHIPPED | OUTPATIENT
Start: 2022-10-21 | End: 2023-04-11

## 2022-10-21 NOTE — PROGRESS NOTES
Mariya is a 47 year old who is being evaluated via a billable video visit.      How would you like to obtain your AVS? MyChart  If the video visit is dropped, the invitation should be resent by: Text to cell phone: [phone]  Will anyone else be joining your video visit? No      Assessment and Plan  1. Closed fracture of lumbar vertebra with routine healing, unspecified fracture morphology, unspecified lumbar vertebral level, subsequent encounter  Ongoing mild back pain which is well controlled on current Gabapentin which her Psychiatrist gives for her anxiety and depression. Continue current Gabapentin 100 mg TID.   - Discussed in detail the MRI report with depiction by picture on shared screen by me. Answered all the questions.   - Pt reassures , but she is concerned that she didn't hear back any from her Neurosurgeon atleast the results of MRI Lumbar done and the next steps. Requests me to CC this request to Neurosurgeon.  Will do as requested.   - Placed DEXA scan as per shared decision.   - DX Hip/Pelvis/Spine; Future  - vitamin D3 (CHOLECALCIFEROL) 50 mcg (2000 units) tablet; Take 1 tablet (50 mcg) by mouth daily  Dispense: 90 tablet; Refill: 1    2. Screen for colon cancer  - Fecal colorectal cancer screen (FIT); Future     Patient Instructions   As discussed in detail , your Lumbar vertebral fractures are more Subacute in nature and not a compression fracture. Please follow up with your Neurosurgeon for next steps.     ======================            Return in about 3 months (around 1/21/2023), or if symptoms worsen or fail to improve, for If symptoms persist, Follow up of last visit.    Laura Vale MD  [facility] [place]   Mariya is a 47 year old, presenting for the following health issues:  Back Pain and MRI Transcription (Results)      History of Present Illness       Back Pain:  She presents for follow up of back pain. Patient's back pain is a chronic  problem.  Location of back pain:  Right lower back and left lower back  Description of back pain: sharp, shooting and stabbing  Back pain spreads: right thigh and left thigh    Since patient first noticed back pain, pain is: always present, but gets better and worse  Does back pain interfere with her job:  Yes      Reason for visit:  Rash  uti    She eats 0-1 servings of fruits and vegetables daily.She consumes 1 sweetened beverage(s) daily.She exercises with enough effort to increase her heart rate 9 or less minutes per day.  She exercises with enough effort to increase her heart rate 3 or less days per week.   She is taking medications regularly.    Last seen pt on 10/2022 for Annual physical.      No Known Allergies     History reviewed. No pertinent past medical history.    History reviewed. No pertinent surgical history.    History reviewed. No pertinent family history.    Social History     Tobacco Use     Smoking status: Every Day     Packs/day: 0.25     Types: Cigarettes     Start date: 1/1/1990     Smokeless tobacco: Never     Tobacco comments:     4 Cig/day   Substance Use Topics     Alcohol use: Not Currently     Comment: 1 liter Vodka / day since 20 yrs age intermittently ; Last drink 8/13/22        Current Outpatient Medications   Medication     aspirin (ASA) 325 MG EC tablet     buPROPion (WELLBUTRIN XL) 300 MG 24 hr tablet     gabapentin (NEURONTIN) 100 MG capsule     hydrOXYzine (ATARAX) 25 MG tablet     metFORMIN (GLUCOPHAGE XR) 500 MG 24 hr tablet     Multiple Vitamin (MULTIVITAMIN ADULT PO)     sertraline (ZOLOFT) 50 MG tablet     topiramate (TOPAMAX) 100 MG tablet     traZODone (DESYREL) 150 MG tablet     vitamin D3 (CHOLECALCIFEROL) 50 mcg (2000 units) tablet     No current facility-administered medications for this visit.        Review of Systems   Constitutional, HEENT, cardiovascular, pulmonary, GI, , musculoskeletal, neuro, skin, endocrine and psych systems are negative, except as  otherwise noted.      Objective           Vitals:  No vitals were obtained today due to virtual visit.    Physical Exam   GENERAL: Healthy, alert and no distress  EYES: Eyes grossly normal to inspection.  No discharge or erythema, or obvious scleral/conjunctival abnormalities.  RESP: No audible wheeze, cough, or visible cyanosis.  No visible retractions or increased work of breathing.    SKIN: Visible skin clear. No significant rash, abnormal pigmentation or lesions.  NEURO: Cranial nerves grossly intact.  Mentation and speech appropriate for age.  PSYCH: Mentation appears normal, affect normal/bright, judgement and insight intact, normal speech and appearance well-groomed.      Video-Visit Details    Video Start Time: Start : 9.39 AM    Type of service:  Video Visit    Video End Time:Stop : 9.55 AM     Originating Location (pt. Location): Home        Distant Location (provider location):  On-site    Platform used for Video Visit: [employer]

## 2022-10-21 NOTE — Clinical Note
Yahir Rogers,  Patient requesting that Neurosurgery didn't contact her after MRI and asking for the plan from you.   Thanks & Regards, Laura Vale MD on 10/21/2022 at 1:16 PM

## 2022-10-21 NOTE — PATIENT INSTRUCTIONS
As discussed in detail , your Lumbar vertebral fractures are more Subacute in nature and not a compression fracture. Please follow up with your Neurosurgeon for next steps.     ======================

## 2022-10-22 LAB — BACTERIA UR CULT: NORMAL

## 2022-10-24 LAB
BKR LAB AP GYN ADEQUACY: NORMAL
BKR LAB AP GYN INTERPRETATION: NORMAL
BKR LAB AP HPV REFLEX: NORMAL
BKR LAB AP PREVIOUS ABNORMAL: NORMAL
PATH REPORT.COMMENTS IMP SPEC: NORMAL
PATH REPORT.COMMENTS IMP SPEC: NORMAL
PATH REPORT.RELEVANT HX SPEC: NORMAL

## 2022-10-25 ENCOUNTER — TELEPHONE (OUTPATIENT)
Dept: NEUROSURGERY | Facility: CLINIC | Age: 47
End: 2022-10-25

## 2022-10-26 ENCOUNTER — ALLIED HEALTH/NURSE VISIT (OUTPATIENT)
Dept: EDUCATION SERVICES | Facility: CLINIC | Age: 47
End: 2022-10-26
Payer: COMMERCIAL

## 2022-10-26 DIAGNOSIS — E11.9 TYPE 2 DIABETES MELLITUS WITHOUT COMPLICATION, WITHOUT LONG-TERM CURRENT USE OF INSULIN (H): ICD-10-CM

## 2022-10-26 LAB
HUMAN PAPILLOMA VIRUS 16 DNA: NEGATIVE
HUMAN PAPILLOMA VIRUS 18 DNA: NEGATIVE
HUMAN PAPILLOMA VIRUS FINAL DIAGNOSIS: ABNORMAL
HUMAN PAPILLOMA VIRUS OTHER HR: POSITIVE

## 2022-10-26 PROCEDURE — G0108 DIAB MANAGE TRN  PER INDIV: HCPCS | Performed by: DIETITIAN, REGISTERED

## 2022-10-26 RX ORDER — BLOOD SUGAR DIAGNOSTIC
STRIP MISCELLANEOUS
Qty: 100 STRIP | Refills: 3 | Status: SHIPPED | OUTPATIENT
Start: 2022-10-26 | End: 2022-12-23

## 2022-10-26 RX ORDER — LANCETS
EACH MISCELLANEOUS
Qty: 100 EACH | Refills: 4 | Status: SHIPPED | OUTPATIENT
Start: 2022-10-26 | End: 2023-10-26

## 2022-10-26 NOTE — LETTER
10/26/2022         RE: Mariya Núñez  [address]  [place] [address]        Dear Colleague,    Thank you for referring your patient, Mariya Núñez, to the [facility]. Please see a copy of my visit note below.    Diabetes Self-Management Education & Support    Presents for: Initial Assessment for new diagnosis    Type of Service: In Person Visit    Assessment Type:   ASSESSMENT:  Patient with a new dx of Type 2 diabetes  Very tearful and overwhelmed today  Taking Metformin without side effects  Has adjusted diet - avoiding carbs (bread, pasta, rice, etc)  Notes a hx of bulimia and alcoholism  BG in clinic today = 111  Notes blurry vision and some thirst    Patient's most recent   Lab Results   Component Value Date    A1C 12.3 10/12/2022     is not meeting goal of <7.0    Diabetes knowledge and skills assessment:   Patient is knowledgeable in diabetes management concepts related to: Taking Medication    Continue education with the following diabetes management concepts: Healthy Eating, Being Active, Monitoring, Problem Solving, Reducing Risks and Healthy Coping    Based on learning assessment above, most appropriate setting for further diabetes education would be: Individual setting.      PLAN    Continue Metformin  Test glucose 2-3x/day, fasting, pre-meal or 2 hrs postmeal  Return in 4-6 weeks to discuss carb controlled diet    Topics to cover at upcoming visits: Healthy Eating, Problem Solving and Reducing Risks    Follow-up: 11/30    See Care Plan for co-developed, patient-state behavior change goals.  AVS provided for patient today.    Education Materials Provided:  [facility] Understanding Diabetes Booklet and BG Log Sheet      SUBJECTIVE/OBJECTIVE:  Presents for: Initial Assessment for new diagnosis  Accompanied by: Self  Diabetes education in the past 24mo: No  Focus of Visit: Patient Unsure  Diabetes type: Type 2  Disease course: Other  How confident are  "you filling out medical forms by yourself:: Extremely  Diabetes management related comments/concerns: knowing sugar levels  Other concerns:: None  Cultural Influences/Ethnic Background:  Not  or       Diabetes Symptoms & Complications:  Fatigue: No  Neuropathy: No  Polydipsia: Sometimes  Polyphagia: No  Polyuria: No  Visual change: Yes  Slow healing wounds: Sometimes  Complications assessed today?: No  Autonomic neuropathy: No  CVA: No  Heart disease: No  Nephropathy: No  Peripheral neuropathy: No  Peripheral Vascular Disease: No  Retinopathy: Other  Sexual dysfunction: No    Patient Problem List and Family Medical History reviewed for relevant medical history, current medical status, and diabetes risk factors.    Vitals:  There were no vitals taken for this visit.  Estimated body mass index is 31.07 kg/m  as calculated from the following:    Height as of 10/20/22: 1.651 m (5' 5\").    Weight as of 10/20/22: 84.7 kg (186 lb 11.2 oz).   Last 3 BP:   BP Readings from Last 3 Encounters:   10/20/22 108/66   10/12/22 110/80   10/12/22 112/79       History   Smoking Status     Every Day     Packs/day: 0.25     Types: Cigarettes     Start date: 1/1/1990   Smokeless Tobacco     Never       Labs:  Lab Results   Component Value Date    A1C 12.3 10/12/2022     Lab Results   Component Value Date     09/01/2022    GLC 97 11/11/2020     Lab Results   Component Value Date    LDL 56 09/01/2022     Direct Measure HDL   Date Value Ref Range Status   09/01/2022 48 (L) >=50 mg/dL Final   ]  GFR Estimate   Date Value Ref Range Status   09/01/2022 >90 >60 mL/min/1.73m2 Final     Comment:     Effective December 21, 2021 eGFRcr in adults is calculated using the 2021 CKD-EPI creatinine equation which includes age and gender (Savana castillo al., NEJM, DOI: 10.1056/FCQHzq6114733)   11/11/2020 89 >60 mL/min/[1.73_m2] Final     Comment:     Starting 12/18/2018, serum creatinine based estimated GFR (eGFR) will be   calculated using " the Chronic Kidney Disease Epidemiology Collaboration   (CKD-EPI) equation.       GFR Estimate If Black   Date Value Ref Range Status   11/11/2020 103 >60 mL/min/[1.73_m2] Final     Comment:     Starting 12/18/2018, serum creatinine based estimated GFR (eGFR) will be   calculated using the Chronic Kidney Disease Epidemiology Collaboration   (CKD-EPI) equation.       Lab Results   Component Value Date    CR 0.67 09/01/2022    CR 0.80 11/11/2020     No results found for: MICROALBUMIN    Healthy Eating:  Healthy Eating Assessed Today: Yes  Cultural/Protestant diet restrictions?: No  Meal planning/habits: Avoiding sweets, Low carb, Heart healthy, Low salt, Smaller portions, Keeps food records  How many times a week on average do you eat food made away from home (restaurant/take-out)?: 0  Meals include: Dinner, Morning Snack, Evening Snack  Lunch: 12pm - salad kit, sunflower seeds OR raw veggies, 1 oz cheese or HB egg  Dinner: 5/6pm - veggies (cucumber, squash, peppers, et), chicken, Greek yogurt OR canned soup  Snacks: hs - veggies w/ hummus  Beverages: Water  Has patient met with a dietitian in the past?: No    Being Active:  Being Active Assessed Today: Yes  Exercise:: Currently not exercising  Barrier to exercise: Safety, Physical limitation (spine fracture, degenerative changes)    Monitoring:  Monitoring Assessed Today: Yes  Did patient bring glucose meter to appointment? : No  Times checking blood sugar at home (number): Never  Blood glucose trend: Other        Taking Medications:  Diabetes Medication(s)     Biguanides       metFORMIN (GLUCOPHAGE XR) 500 MG 24 hr tablet    Take 2 tablets (1,000 mg) by mouth 2 times daily (with meals)          Taking Medication Assessed Today: Yes  Current Treatments: Oral Medication (taken by mouth)  Problems taking diabetes medications regularly?: No  Diabetes medication side effects?: No    Problem Solving:  Is the patient at risk for hypoglycemia?: No  Is the patient at risk for  DKA?: No              Reducing Risks:  Reducing Risks Assessed Today: No  CAD Risks: Diabetes Mellitus, Hypertension, Post-menopausal, Sedentary lifestyle, Stress, Tobacco exposure    Healthy Coping:  Healthy Coping Assessed Today: Yes  Emotional response to diabetes: Ready to learn, Concern for health and well-being, Fear/Anxiety  Informal Support system:: Friends  Stage of change: ACTION (Actively working towards change)  Support resources: None  Patient Activation Measure Survey Score:  No flowsheet data found.      Care Plan and Education Provided:  Patient was instructed on Accu-Chek Guide meter and was able to provide an accurate return demonstration. Patient's blood glucose reading today was 111 mg/dL.    YASMANY Warren [facility]      Time Spent: 60 minutes  Encounter Type: Individual    Any diabetes medication dose changes were made via the CDE Protocol per the patient's referring provider. A copy of this encounter was shared with the provider.

## 2022-10-26 NOTE — PROGRESS NOTES
Diabetes Self-Management Education & Support    Presents for: Initial Assessment for new diagnosis    Type of Service: In Person Visit    Assessment Type:   ASSESSMENT:  Patient with a new dx of Type 2 diabetes  Very tearful and overwhelmed today  Taking Metformin without side effects  Has adjusted diet - avoiding carbs (bread, pasta, rice, etc)  Notes a hx of bulimia and alcoholism  BG in clinic today = 111  Notes blurry vision and some thirst    Patient's most recent   Lab Results   Component Value Date    A1C 12.3 10/12/2022     is not meeting goal of <7.0    Diabetes knowledge and skills assessment:   Patient is knowledgeable in diabetes management concepts related to: Taking Medication    Continue education with the following diabetes management concepts: Healthy Eating, Being Active, Monitoring, Problem Solving, Reducing Risks and Healthy Coping    Based on learning assessment above, most appropriate setting for further diabetes education would be: Individual setting.      PLAN    Continue Metformin  Test glucose 2-3x/day, fasting, pre-meal or 2 hrs postmeal  Return in 4-6 weeks to discuss carb controlled diet    Topics to cover at upcoming visits: Healthy Eating, Problem Solving and Reducing Risks    Follow-up: 11/30    See Care Plan for co-developed, patient-state behavior change goals.  AVS provided for patient today.    Education Materials Provided:  [facility] Understanding Diabetes Booklet and BG Log Sheet      SUBJECTIVE/OBJECTIVE:  Presents for: Initial Assessment for new diagnosis  Accompanied by: Self  Diabetes education in the past 24mo: No  Focus of Visit: Patient Unsure  Diabetes type: Type 2  Disease course: Other  How confident are you filling out medical forms by yourself:: Extremely  Diabetes management related comments/concerns: knowing sugar levels  Other concerns:: None  Cultural Influences/Ethnic Background:  Not  or       Diabetes Symptoms & Complications:  Fatigue:  "No  Neuropathy: No  Polydipsia: Sometimes  Polyphagia: No  Polyuria: No  Visual change: Yes  Slow healing wounds: Sometimes  Complications assessed today?: No  Autonomic neuropathy: No  CVA: No  Heart disease: No  Nephropathy: No  Peripheral neuropathy: No  Peripheral Vascular Disease: No  Retinopathy: Other  Sexual dysfunction: No    Patient Problem List and Family Medical History reviewed for relevant medical history, current medical status, and diabetes risk factors.    Vitals:  There were no vitals taken for this visit.  Estimated body mass index is 31.07 kg/m  as calculated from the following:    Height as of 10/20/22: 1.651 m (5' 5\").    Weight as of 10/20/22: 84.7 kg (186 lb 11.2 oz).   Last 3 BP:   BP Readings from Last 3 Encounters:   10/20/22 108/66   10/12/22 110/80   10/12/22 112/79       History   Smoking Status     Every Day     Packs/day: 0.25     Types: Cigarettes     Start date: 1/1/1990   Smokeless Tobacco     Never       Labs:  Lab Results   Component Value Date    A1C 12.3 10/12/2022     Lab Results   Component Value Date     09/01/2022    GLC 97 11/11/2020     Lab Results   Component Value Date    LDL 56 09/01/2022     Direct Measure HDL   Date Value Ref Range Status   09/01/2022 48 (L) >=50 mg/dL Final   ]  GFR Estimate   Date Value Ref Range Status   09/01/2022 >90 >60 mL/min/1.73m2 Final     Comment:     Effective December 21, 2021 eGFRcr in adults is calculated using the 2021 CKD-EPI creatinine equation which includes age and gender (Savana castillo al., NEJM, DOI: 10.1056/KRVDej8654803)   11/11/2020 89 >60 mL/min/[1.73_m2] Final     Comment:     Starting 12/18/2018, serum creatinine based estimated GFR (eGFR) will be   calculated using the Chronic Kidney Disease Epidemiology Collaboration   (CKD-EPI) equation.       GFR Estimate If Black   Date Value Ref Range Status   11/11/2020 103 >60 mL/min/[1.73_m2] Final     Comment:     Starting 12/18/2018, serum creatinine based estimated GFR (eGFR) " will be   calculated using the Chronic Kidney Disease Epidemiology Collaboration   (CKD-EPI) equation.       Lab Results   Component Value Date    CR 0.67 09/01/2022    CR 0.80 11/11/2020     No results found for: MICROALBUMIN    Healthy Eating:  Healthy Eating Assessed Today: Yes  Cultural/Orthodoxy diet restrictions?: No  Meal planning/habits: Avoiding sweets, Low carb, Heart healthy, Low salt, Smaller portions, Keeps food records  How many times a week on average do you eat food made away from home (restaurant/take-out)?: 0  Meals include: Dinner, Morning Snack, Evening Snack  Lunch: 12pm - salad kit, sunflower seeds OR raw veggies, 1 oz cheese or HB egg  Dinner: 5/6pm - veggies (cucumber, squash, peppers, et), chicken, Greek yogurt OR canned soup  Snacks: hs - veggies w/ hummus  Beverages: Water  Has patient met with a dietitian in the past?: No    Being Active:  Being Active Assessed Today: Yes  Exercise:: Currently not exercising  Barrier to exercise: Safety, Physical limitation (spine fracture, degenerative changes)    Monitoring:  Monitoring Assessed Today: Yes  Did patient bring glucose meter to appointment? : No  Times checking blood sugar at home (number): Never  Blood glucose trend: Other        Taking Medications:  Diabetes Medication(s)     Biguanides       metFORMIN (GLUCOPHAGE XR) 500 MG 24 hr tablet    Take 2 tablets (1,000 mg) by mouth 2 times daily (with meals)          Taking Medication Assessed Today: Yes  Current Treatments: Oral Medication (taken by mouth)  Problems taking diabetes medications regularly?: No  Diabetes medication side effects?: No    Problem Solving:  Is the patient at risk for hypoglycemia?: No  Is the patient at risk for DKA?: No              Reducing Risks:  Reducing Risks Assessed Today: No  CAD Risks: Diabetes Mellitus, Hypertension, Post-menopausal, Sedentary lifestyle, Stress, Tobacco exposure    Healthy Coping:  Healthy Coping Assessed Today: Yes  Emotional response to  diabetes: Ready to learn, Concern for health and well-being, Fear/Anxiety  Informal Support system:: Friends  Stage of change: ACTION (Actively working towards change)  Support resources: None  Patient Activation Measure Survey Score:  No flowsheet data found.      Care Plan and Education Provided:  Patient was instructed on Accu-Chek Guide meter and was able to provide an accurate return demonstration. Patient's blood glucose reading today was 111 mg/dL.    YASMANY [facility]      Time Spent: 60 minutes  Encounter Type: Individual    Any diabetes medication dose changes were made via the CDE Protocol per the patient's referring provider. A copy of this encounter was shared with the provider.

## 2022-10-26 NOTE — PATIENT INSTRUCTIONS
MY DIABETES TODAY:    1)  Goal A1C is under <7.0  Mine is:      Lab Results   Component Value Date    A1C 12.3 10/12/2022       2)  Goal LDL (bad cholesterol) under 100  (measured at least yearly)- I am currently at:   Lab Results   Component Value Date    LDL 56 09/01/2022       3)  Goal blood pressure under 130/80- mine was Data Unavailable today    Care Plan:  Meal Plan Recommendation: eat 3 meals a day, have small snacks between meals, if needed, and use portion control  Exercise / activity plan: As able  Check blood sugars 2-3x/day    Follow up:  Follow-up diabetes education appointment scheduled on 11/30.      Bring blood glucose meter and logbook with you to all doctor and follow-up appointments.     [place] [facility] for the [facility] Area:  For Your Diabetes or Nutrition Education Appointments Call:  [phone]   For Diabetes or Nutrition Related Questions:   [phone]  Send RVX Message   If you need a medication refill please contact your pharmacy. Please allow 3 business days for your refills to be completed.

## 2022-10-27 ENCOUNTER — TELEPHONE (OUTPATIENT)
Dept: NEUROSURGERY | Facility: CLINIC | Age: 47
End: 2022-10-27

## 2022-10-27 NOTE — TELEPHONE ENCOUNTER
----- Message from Loretta Rios RN sent at 10/27/2022  9:52 AM CDT -----  Regarding: Please schedule phone visit  Hi Team,     Sue attempted to reach patient to review MRI, no answer.     Please call patient to schedule phone visit with Sue to review MRI results.     Thanks!    LZ

## 2022-10-27 NOTE — TELEPHONE ENCOUNTER
Staff message sent to scheduling team to contact patient to schedule telephone visit with Sue to discuss MRI results.

## 2022-10-28 ENCOUNTER — PATIENT OUTREACH (OUTPATIENT)
Dept: MIDWIFE SERVICES | Facility: CLINIC | Age: 47
End: 2022-10-28

## 2022-10-28 NOTE — LETTER
December 19, 2022      Mariya Núñez  [address]  [place] [address]        Dear ,    At [facility], your health and wellness are our primary concern. That is why we are following up on your most recent positive high-risk Human Papillomavirus (HPV) test.    Please call [phone] to schedule an appointment for your recommended follow-up Colposcopy (this cannot be scheduled through [facility]) at your earliest convenience.     If you have completed the appointment outside of the [facility] system, please have the records forwarded to our office. We will update your chart for your provider to review before your next annual wellness visit.     Thank you for choosing [facility]!      Sincerely,    Your [facility] Care Team

## 2022-10-31 ENCOUNTER — TELEPHONE (OUTPATIENT)
Dept: FAMILY MEDICINE | Facility: CLINIC | Age: 47
End: 2022-10-31

## 2022-10-31 DIAGNOSIS — T78.40XA RASH DUE TO ALLERGY: ICD-10-CM

## 2022-10-31 DIAGNOSIS — E11.9 TYPE 2 DIABETES MELLITUS WITHOUT COMPLICATION, WITHOUT LONG-TERM CURRENT USE OF INSULIN (H): Primary | ICD-10-CM

## 2022-10-31 DIAGNOSIS — R21 GENERALIZED MACULOPAPULAR RASH: ICD-10-CM

## 2022-10-31 DIAGNOSIS — R21 RASH DUE TO ALLERGY: ICD-10-CM

## 2022-11-03 RX ORDER — HYDROXYZINE HYDROCHLORIDE 25 MG/1
TABLET, FILM COATED ORAL
Qty: 30 TABLET | Refills: 1 | Status: SHIPPED | OUTPATIENT
Start: 2022-11-03 | End: 2022-12-23

## 2022-11-03 NOTE — TELEPHONE ENCOUNTER
Prescription approved per [facility] Refill Protocol.  Nuvia Daniels, RN  [facility] Triage Nurse

## 2022-11-07 ENCOUNTER — MYC MEDICAL ADVICE (OUTPATIENT)
Dept: FAMILY MEDICINE | Facility: CLINIC | Age: 47
End: 2022-11-07

## 2022-11-07 ENCOUNTER — VIRTUAL VISIT (OUTPATIENT)
Dept: NEUROSURGERY | Facility: CLINIC | Age: 47
End: 2022-11-07
Payer: COMMERCIAL

## 2022-11-07 DIAGNOSIS — G89.29 CHRONIC MIDLINE LOW BACK PAIN WITHOUT SCIATICA: Primary | ICD-10-CM

## 2022-11-07 DIAGNOSIS — M54.50 CHRONIC MIDLINE LOW BACK PAIN WITHOUT SCIATICA: Primary | ICD-10-CM

## 2022-11-07 PROCEDURE — 99213 OFFICE O/P EST LOW 20 MIN: CPT | Mod: 95 | Performed by: PHYSICIAN ASSISTANT

## 2022-11-07 ASSESSMENT — PAIN SCALES - GENERAL: PAINLEVEL: EXTREME PAIN (8)

## 2022-11-07 NOTE — NURSING NOTE
"Mariya Núñez is a 47 year old female who presents for:  Chief Complaint   Patient presents with     Results     MRI follow up        Initial Vitals:  There were no vitals taken for this visit. Estimated body mass index is 31.07 kg/m  as calculated from the following:    Height as of 10/20/22: 5' 5\" (1.651 m).    Weight as of 10/20/22: 186 lb 11.2 oz (84.7 kg).. There is no height or weight on file to calculate BSA. BP completed using cuff size: regular  Extreme Pain (8)    Nursing Comments:     Sarah Morel MA  "

## 2022-11-07 NOTE — PROGRESS NOTES
"Mariya Núñez is a 47 year old female who is being evaluated via a billable telephone visit.      Due to COVID-19 this visit has been performed over the phone verses face to face.     The patient has been notified of following:     \"This telephone visit will be conducted via a call between you and your physician/provider. We have found that certain health care needs can be provided without the need for a physical exam.  This service lets us provide the care you need with a short phone conversation.  If a prescription is necessary we can send it directly to your pharmacy.  If lab work is needed we can place an order for that and you can then stop by our lab to have the test done at a later time.    If during the course of the call the physician/provider feels a telephone visit is not appropriate, you will not be charged for this service.\"     Mariya Núñez complains of    Chief Complaint   Patient presents with     Results     MRI follow up       I have reviewed and updated the patient's Past Medical History, Social History, Family History and Medication List.    ALLERGIES  Patient has no known allergies.    Additional provider notes:    Mariya Núñez is a 47 year old female who was seen in clinic for back pain.  She had a CT scan which demonstrated subacute fractures of L3-L4 and L5, this was followed up with a lumbar MRI, lumbar MRI did not demonstrate any abnormal edema signal and of the vertebral bodies.  There was no high-grade central or foraminal stenosis on the MRI.    Patient states she continues to have back pain, and now she is getting occasional pain in her legs.  She would be interested in meeting with the pain clinic for comprehensive evaluation.  She states her symptoms have been going on for many years    Assessment    Back pain with occasional radiculopathy      Plan:    I recommend the patient meet with the pain clinic for comprehensive evaluation for conservative therapies and possible " injections and other options.  She can follow-up with us as needed if she is not improving.    Phone call duration: 10 minutes  Start Time 1120  End Time 1130    Sue Amador PA-C    Patient provided verbal consent for the phone visit today.

## 2022-11-07 NOTE — LETTER
"    11/7/2022         RE: Mariya Núñez  [address]  [place] [address]        Dear Colleague,    Thank you for referring your patient, Mariya Núñez, to the [facility]. Please see a copy of my visit note below.    Mariya Núñez is a 47 year old female who is being evaluated via a billable telephone visit.      Due to COVID-19 this visit has been performed over the phone verses face to face.     The patient has been notified of following:     \"This telephone visit will be conducted via a call between you and your physician/provider. We have found that certain health care needs can be provided without the need for a physical exam.  This service lets us provide the care you need with a short phone conversation.  If a prescription is necessary we can send it directly to your pharmacy.  If lab work is needed we can place an order for that and you can then stop by our lab to have the test done at a later time.    If during the course of the call the physician/provider feels a telephone visit is not appropriate, you will not be charged for this service.\"     Mariya Núñez complains of    Chief Complaint   Patient presents with     Results     MRI follow up       I have reviewed and updated the patient's Past Medical History, Social History, Family History and Medication List.    ALLERGIES  Patient has no known allergies.    Additional provider notes:    Mariya Núñez is a 47 year old female who was seen in clinic for back pain.  She had a CT scan which demonstrated subacute fractures of L3-L4 and L5, this was followed up with a lumbar MRI, lumbar MRI did not demonstrate any abnormal edema signal and of the vertebral bodies.  There was no high-grade central or foraminal stenosis on the MRI.    Patient states she continues to have back pain, and now she is getting occasional pain in her legs.  She would be interested in meeting with the pain clinic for comprehensive " evaluation.  She states her symptoms have been going on for many years    Assessment    Back pain with occasional radiculopathy      Plan:    I recommend the patient meet with the pain clinic for comprehensive evaluation for conservative therapies and possible injections and other options.  She can follow-up with us as needed if she is not improving.    Phone call duration: 10 minutes  Start Time 1120  End Time 1130    Sue Amador PA-C    Patient provided verbal consent for the phone visit today.       Again, thank you for allowing me to participate in the care of your patient.        Sincerely,        Sue Amador PA-C

## 2022-11-11 ENCOUNTER — VIRTUAL VISIT (OUTPATIENT)
Dept: FAMILY MEDICINE | Facility: CLINIC | Age: 47
End: 2022-11-11
Payer: COMMERCIAL

## 2022-11-11 DIAGNOSIS — M47.816 LUMBAR SPONDYLOSIS: ICD-10-CM

## 2022-11-11 DIAGNOSIS — S32.009D CLOSED FRACTURE OF LUMBAR VERTEBRA WITH ROUTINE HEALING, UNSPECIFIED FRACTURE MORPHOLOGY, UNSPECIFIED LUMBAR VERTEBRAL LEVEL, SUBSEQUENT ENCOUNTER: ICD-10-CM

## 2022-11-11 DIAGNOSIS — L29.9 PRURITIC DISORDER: Primary | ICD-10-CM

## 2022-11-11 DIAGNOSIS — M51.369 DDD (DEGENERATIVE DISC DISEASE), LUMBAR: ICD-10-CM

## 2022-11-11 PROBLEM — F32.9 MAJOR DEPRESSION: Status: ACTIVE | Noted: 2022-11-11

## 2022-11-11 PROCEDURE — 99214 OFFICE O/P EST MOD 30 MIN: CPT | Mod: 95 | Performed by: INTERNAL MEDICINE

## 2022-11-11 RX ORDER — METHYLPREDNISOLONE 4 MG
TABLET, DOSE PACK ORAL
Qty: 21 TABLET | Refills: 0 | Status: SHIPPED | OUTPATIENT
Start: 2022-11-11 | End: 2022-12-23

## 2022-11-11 NOTE — TELEPHONE ENCOUNTER
Well, Since pt  is already following the Neurosurgeon specialist I would recommend to follow the recommendations of him. If she is needing a second opinion from another neurosurgeon of her choice, I will be happy to place another referral . '    Thank you,  Laura Vale MD on 11/11/2022

## 2022-11-11 NOTE — PROGRESS NOTES
Mariya is a 47 year old who is being evaluated via a billable video visit.      How would you like to obtain your AVS? MyChart  If the video visit is dropped, the invitation should be resent by: Send to e-mail at: [email or web address]  Will anyone else be joining your video visit? No      Assessment and Plan  1. Pruritic disorder  Ongoing problem, uncontrolled.  Tried hydroxyzine but failed.  Will give Medrol Dosepak and placed referral to allergy specialist for knowing the triggering factors of pruritic disorder which is all over her body as she states, no visible rash except excoriations on this video visit.  - methylPREDNISolone (MEDROL DOSEPAK) 4 MG tablet therapy pack; Follow Package Directions  Dispense: 21 tablet; Refill: 0  - Adult Allergy/Asthma Referral; Future    2. Closed fracture of lumbar vertebra with routine healing, unspecified fracture morphology, unspecified lumbar vertebral level, subsequent encounter  3. Lumbar spondylosis  4. DDD (degenerative disc disease), lumbar  New problems of lumbar spondylosis added to the problem list today along with the previous closed fracture of lumbar vertebra diagnosed for her back pain in the clinic.  She was given referral to neurosurgery who has opined no acute needs of any intervention at this time since this is not an acute fracture and there are healing at this time.  She was given referral to pain management by neurosurgery.  Patient had questions on how the next management will be with the pain clinic, discussed most part of this appointment on keeping of her pain management referral and appointment.  Patient understood and agreed with the plan.       Patient Instructions   As discussed Sent Medrol Dosepak to your pharmacy, please keep up your appointment with the allergy specialist referral placed to know the triggering factors causing your current pruritus/itching.    Please keep up your appointment with the pain clinic as recommended by the neurosurgery  for managing your back pain given the recent new diagnosis on the MRI.    Return in about 3 months (around 2/11/2023), or if symptoms worsen or fail to improve, for Follow up of last visit, If symptoms persist.    Laura Vale MD  [facility] CLAUDIO Meraz is a 47 year old, presenting for the following health issues:  Derm Problem      History of Present Illness       Back Pain:  She presents for follow up of back pain. Patient's back pain is a chronic problem.  Location of back pain:  Right lower back, left lower back, right hip and left hip  Description of back pain: fullness, shooting and other  Back pain spreads: right buttocks and right thigh    Since patient first noticed back pain, pain is: always present, but gets better and worse  Does back pain interfere with her job:  Yes      Reason for visit:  Itchy skin and radilogy recommendations    She eats 4 or more servings of fruits and vegetables daily.She consumes 0 sweetened beverage(s) daily.She exercises with enough effort to increase her heart rate 9 or less minutes per day.  She exercises with enough effort to increase her heart rate 3 or less days per week.   She is taking medications regularly.     No Known Allergies     History reviewed. No pertinent past medical history.    History reviewed. No pertinent surgical history.    History reviewed. No pertinent family history.    Social History     Tobacco Use     Smoking status: Every Day     Packs/day: 0.25     Types: Cigarettes     Start date: 1/1/1990     Smokeless tobacco: Never     Tobacco comments:     4 Cig/day   Substance Use Topics     Alcohol use: Not Currently     Comment: 1 liter Vodka / day since 20 yrs age intermittently ; Last drink 8/13/22        Current Outpatient Medications   Medication     ACCU-CHEK GUIDE test strip     aspirin (ASA) 325 MG EC tablet     blood glucose monitoring (NO BRAND SPECIFIED) meter device kit     blood glucose monitoring  (SOFTCLIX) lancets     buPROPion (WELLBUTRIN XL) 300 MG 24 hr tablet     gabapentin (NEURONTIN) 100 MG capsule     hydrOXYzine (ATARAX) 25 MG tablet     metFORMIN (GLUCOPHAGE XR) 500 MG 24 hr tablet     methylPREDNISolone (MEDROL DOSEPAK) 4 MG tablet therapy pack     Multiple Vitamin (MULTIVITAMIN ADULT PO)     sertraline (ZOLOFT) 50 MG tablet     topiramate (TOPAMAX) 100 MG tablet     traZODone (DESYREL) 150 MG tablet     vitamin D3 (CHOLECALCIFEROL) 50 mcg (2000 units) tablet     No current facility-administered medications for this visit.        Review of Systems   Constitutional, HEENT, cardiovascular, pulmonary, GI, , musculoskeletal, neuro, skin, endocrine and psych systems are negative, except as otherwise noted.      Objective           Vitals:  No vitals were obtained today due to virtual visit.    Physical Exam   GENERAL: Healthy, alert and no distress  EYES: Eyes grossly normal to inspection.  No discharge or erythema, or obvious scleral/conjunctival abnormalities.  RESP: No audible wheeze, cough, or visible cyanosis.  No visible retractions or increased work of breathing.    SKIN: Visible skin clear. No significant rash, abnormal pigmentation or lesions.  NEURO: Cranial nerves grossly intact.  Mentation and speech appropriate for age.  PSYCH: Mentation appears normal, affect normal/bright, judgement and insight intact, normal speech and appearance well-groomed.    Labs and imaging reviewed and discussed with the patient.    Video-Visit Details    Video Start Time: 3.15 PM     Type of service:  Video Visit    Video End Time:3.35 PM    Originating Location (pt. Location): Home        Distant Location (provider location):  On-site    Platform used for Video Visit: Jarad

## 2022-11-11 NOTE — PATIENT INSTRUCTIONS
As discussed Sent Medrol Dosepak to your pharmacy, please keep up your appointment with the allergy specialist referral placed to know the triggering factors causing your current pruritus/itching.    Please keep up your appointment with the pain clinic as recommended by the neurosurgery for managing your back pain given the recent new diagnosis on the MRI.

## 2022-11-14 NOTE — TELEPHONE ENCOUNTER
Patient notified of provider's response via [employer].    Donna Shane RN  [facility] Triage Team

## 2022-11-15 ENCOUNTER — ANCILLARY PROCEDURE (OUTPATIENT)
Dept: MAMMOGRAPHY | Facility: CLINIC | Age: 47
End: 2022-11-15
Attending: INTERNAL MEDICINE
Payer: COMMERCIAL

## 2022-11-15 ENCOUNTER — ANCILLARY PROCEDURE (OUTPATIENT)
Dept: BONE DENSITY | Facility: CLINIC | Age: 47
End: 2022-11-15
Attending: INTERNAL MEDICINE
Payer: COMMERCIAL

## 2022-11-15 DIAGNOSIS — S32.009D CLOSED FRACTURE OF LUMBAR VERTEBRA WITH ROUTINE HEALING, UNSPECIFIED FRACTURE MORPHOLOGY, UNSPECIFIED LUMBAR VERTEBRAL LEVEL, SUBSEQUENT ENCOUNTER: ICD-10-CM

## 2022-11-15 DIAGNOSIS — Z12.31 VISIT FOR SCREENING MAMMOGRAM: ICD-10-CM

## 2022-11-15 PROCEDURE — 77080 DXA BONE DENSITY AXIAL: CPT | Performed by: INTERNAL MEDICINE

## 2022-11-15 PROCEDURE — 77067 SCR MAMMO BI INCL CAD: CPT | Mod: TC | Performed by: RADIOLOGY

## 2022-11-17 ENCOUNTER — TELEPHONE (OUTPATIENT)
Dept: FAMILY MEDICINE | Facility: CLINIC | Age: 47
End: 2022-11-17

## 2022-11-17 DIAGNOSIS — M80.00XD OSTEOPOROSIS WITH CURRENT PATHOLOGICAL FRACTURE WITH ROUTINE HEALING, UNSPECIFIED OSTEOPOROSIS TYPE, SUBSEQUENT ENCOUNTER: Primary | ICD-10-CM

## 2022-11-17 RX ORDER — ALENDRONATE SODIUM 70 MG/1
70 TABLET ORAL
Qty: 4 TABLET | Refills: 4 | Status: SHIPPED | OUTPATIENT
Start: 2022-11-17 | End: 2022-12-23

## 2022-11-17 NOTE — RESULT ENCOUNTER NOTE
Your DEXA scan is positive for Osteoporosis with high risk of fracture. You will need to be on medication called Fosamax once a week for improvement. Please take Vitamin D 2000 units daily in addition.   The medication needs to be taken as instructed by the pharmacist and also need to let your dentist know about it , for any plans of dental procedures he will need to stop the medication before planned procedures.     Please let me know if you have any questions.   Laura Vale MD on 11/17/2022

## 2022-11-30 ENCOUNTER — ALLIED HEALTH/NURSE VISIT (OUTPATIENT)
Dept: EDUCATION SERVICES | Facility: CLINIC | Age: 47
End: 2022-11-30
Payer: COMMERCIAL

## 2022-11-30 DIAGNOSIS — E11.9 TYPE 2 DIABETES MELLITUS WITHOUT COMPLICATION, WITHOUT LONG-TERM CURRENT USE OF INSULIN (H): Primary | ICD-10-CM

## 2022-11-30 PROCEDURE — G0108 DIAB MANAGE TRN  PER INDIV: HCPCS | Performed by: DIETITIAN, REGISTERED

## 2022-11-30 NOTE — PATIENT INSTRUCTIONS
MY DIABETES TODAY:    1)  Goal A1C is under <7.0  Mine is:      Lab Results   Component Value Date    A1C 12.3 10/12/2022       2)  Goal LDL (bad cholesterol) under 100  (measured at least yearly)- I am currently at:   Lab Results   Component Value Date    LDL 56 09/01/2022       3)  Goal blood pressure under 130/80- mine was Data Unavailable today    Care Plan:  Meal Plan Recommendation: eat 3 meals a day, have small snacks between meals, if needed, and use portion control  Check blood sugars once daily, rotate times  No changes in the patient's current treatment plan    Follow up:  Follow-up diabetes education appointment scheduled on 1/25.      Bring blood glucose meter and logbook with you to all doctor and follow-up appointments.     [place] [facility] for the [facility] Area:  For Your Diabetes or Nutrition Education Appointments Call:  [phone]   For Diabetes or Nutrition Related Questions:   [phone]  Send [employer] Message   If you need a medication refill please contact your pharmacy. Please allow 3 business days for your refills to be completed.

## 2022-11-30 NOTE — PROGRESS NOTES
Diabetes Self-Management Education & Support    Presents for: Follow-up    Type of Service: In Person Visit    Assessment Type:   ASSESSMENT:  Patient states she is feeling well today.  Less anxiety and fear about diabetes diagnosis.  Tolerating Metformin.  Maybe struggling with constipation more.  Still following a relatively low carb diet.  Activity limited at this time.  Glucose levels are all within target.  Testing 2-3x/day.    Patient's most recent   Lab Results   Component Value Date    A1C 12.3 10/12/2022     is not meeting goal of <7.0    Diabetes knowledge and skills assessment:   Patient is knowledgeable in diabetes management concepts related to: Healthy Eating, Monitoring and Taking Medication    Continue education with the following diabetes management concepts: Problem Solving    Based on learning assessment above, most appropriate setting for further diabetes education would be: Individual setting.      PLAN    Schedule eye and dental visits.  Continue Metformin.  Test glucose levels once daily, rotate times.  A1c in early January.  Follow-up in 2-3 months.    Topics to cover at upcoming visits: Problem Solving    Follow-up: 1/25    See Care Plan for co-developed, patient-state behavior change goals.  AVS provided for patient today.    Education Materials Provided:  BG Log Sheet and Osteoporosis nutrition therapy      SUBJECTIVE/OBJECTIVE:  Presents for: Follow-up  Accompanied by: Self  Diabetes education in the past 24mo: Yes  Focus of Visit: Patient Unsure  Diabetes type: Type 2  Disease course: Other  How confident are you filling out medical forms by yourself:: Extremely  Diabetes management related comments/concerns: knowing sugar levels  Other concerns:: None  Cultural Influences/Ethnic Background:  Not  or       Diabetes Symptoms & Complications:  Fatigue: No  Neuropathy: No  Polydipsia: Sometimes  Polyphagia: No  Polyuria: No  Visual change: Yes  Slow healing wounds:  "Sometimes  Complications assessed today?: No  Autonomic neuropathy: No  CVA: No  Heart disease: No  Nephropathy: No  Peripheral neuropathy: No  Peripheral Vascular Disease: No  Retinopathy: Other  Sexual dysfunction: No    Patient Problem List and Family Medical History reviewed for relevant medical history, current medical status, and diabetes risk factors.    Vitals:  There were no vitals taken for this visit.  Estimated body mass index is 31.07 kg/m  as calculated from the following:    Height as of 10/20/22: 1.651 m (5' 5\").    Weight as of 10/20/22: 84.7 kg (186 lb 11.2 oz).   Last 3 BP:   BP Readings from Last 3 Encounters:   10/20/22 108/66   10/12/22 110/80   10/12/22 112/79       History   Smoking Status     Every Day     Packs/day: 0.25     Types: Cigarettes     Start date: 1/1/1990   Smokeless Tobacco     Never       Labs:  Lab Results   Component Value Date    A1C 12.3 10/12/2022     Lab Results   Component Value Date     09/01/2022    GLC 97 11/11/2020     Lab Results   Component Value Date    LDL 56 09/01/2022     Direct Measure HDL   Date Value Ref Range Status   09/01/2022 48 (L) >=50 mg/dL Final   ]  GFR Estimate   Date Value Ref Range Status   09/01/2022 >90 >60 mL/min/1.73m2 Final     Comment:     Effective December 21, 2021 eGFRcr in adults is calculated using the 2021 CKD-EPI creatinine equation which includes age and gender (Savana castillo al., NEJ, DOI: 10.1056/NQQAxx5030745)   11/11/2020 89 >60 mL/min/[1.73_m2] Final     Comment:     Starting 12/18/2018, serum creatinine based estimated GFR (eGFR) will be   calculated using the Chronic Kidney Disease Epidemiology Collaboration   (CKD-EPI) equation.       GFR Estimate If Black   Date Value Ref Range Status   11/11/2020 103 >60 mL/min/[1.73_m2] Final     Comment:     Starting 12/18/2018, serum creatinine based estimated GFR (eGFR) will be   calculated using the Chronic Kidney Disease Epidemiology Collaboration   (CKD-EPI) equation.       Lab " Results   Component Value Date    CR 0.67 2022    CR 0.80 2020     No results found for: MICROALBUMIN    Healthy Eating:  Healthy Eating Assessed Today: Yes  Cultural/Worship diet restrictions?: No  Meal planning/habits: Avoiding sweets, Low carb, Heart healthy, Low salt, Smaller portions, Keeps food records  How many times a week on average do you eat food made away from home (restaurant/take-out)?: 0  Meals include: Dinner, Morning Snack, Evening Snack  Breakfast: sometimes vegetable (carrots, celery) OR yogurt (days off only) OR 1/2 cup grapes  Lunch: 12pm - veggies (on the run, busy at this time)  Dinner: 5/6pm - veggies (cucumber, squash, peppers, et), chicken/canned beans OR soup  Snacks: hs - veggies w/ hummus OR celery w/ pb OR cuties OR SF canned fruit  Beverages: Water (crystal light)  Has patient met with a dietitian in the past?: No    Being Active:  Being Active Assessed Today: Yes  Exercise:: Currently not exercising  Barrier to exercise: Safety, Physical limitation (spine fracture, degenerative changes)    Monitoring:  Monitoring Assessed Today: Yes  Did patient bring glucose meter to appointment? : Yes  Times checking blood sugar at home (number): 2  Times checking blood sugar at home (per): Day  Blood glucose trend:  (Fastin-130; Before dinner: ; After dinner: )        Taking Medications:  Diabetes Medication(s)     Biguanides       metFORMIN (GLUCOPHAGE XR) 500 MG 24 hr tablet    Take 2 tablets (1,000 mg) by mouth 2 times daily (with meals)          Taking Medication Assessed Today: Yes  Current Treatments: Oral Medication (taken by mouth)  Problems taking diabetes medications regularly?: No  Diabetes medication side effects?: No    Problem Solving:  Problem Solving Assessed Today: Yes  Is the patient at risk for hypoglycemia?: No  Is the patient at risk for DKA?: No              Reducing Risks:  Reducing Risks Assessed Today: No  CAD Risks: Diabetes Mellitus,  Hypertension, Post-menopausal, Sedentary lifestyle, Stress, Tobacco exposure  Has dilated eye exam at least once a year?: No  Sees dentist every 6 months?: No    Healthy Coping:  Healthy Coping Assessed Today: Yes  Emotional response to diabetes: Ready to learn, Concern for health and well-being  Informal Support system:: Friends  Stage of change: ACTION (Actively working towards change)  Support resources: None  Patient Activation Measure Survey Score:  No flowsheet data found.      Care Plan and Education Provided:      YASMANY Warren [facility]      Time Spent: 30 minutes  Encounter Type: Individual    Any diabetes medication dose changes were made via the CDE Protocol per the patient's referring provider. A copy of this encounter was shared with the provider.

## 2022-11-30 NOTE — LETTER
11/30/2022         RE: Mariya Núñez  [address]  [facility] [address]        Dear Colleague,    Thank you for referring your patient, Mariya Núñez, to the [facility]. Please see a copy of my visit note below.    Diabetes Self-Management Education & Support    Presents for: Follow-up    Type of Service: In Person Visit    Assessment Type:   ASSESSMENT:  Patient states she is feeling well today.  Less anxiety and fear about diabetes diagnosis.  Tolerating Metformin.  Maybe struggling with constipation more.  Still following a relatively low carb diet.  Activity limited at this time.  Glucose levels are all within target.  Testing 2-3x/day.    Patient's most recent   Lab Results   Component Value Date    A1C 12.3 10/12/2022     is not meeting goal of <7.0    Diabetes knowledge and skills assessment:   Patient is knowledgeable in diabetes management concepts related to: Healthy Eating, Monitoring and Taking Medication    Continue education with the following diabetes management concepts: Problem Solving    Based on learning assessment above, most appropriate setting for further diabetes education would be: Individual setting.      PLAN    Schedule eye and dental visits.  Continue Metformin.  Test glucose levels once daily, rotate times.  A1c in early January.  Follow-up in 2-3 months.    Topics to cover at upcoming visits: Problem Solving    Follow-up: 1/25    See Care Plan for co-developed, patient-state behavior change goals.  AVS provided for patient today.    Education Materials Provided:  BG Log Sheet and Osteoporosis nutrition therapy      SUBJECTIVE/OBJECTIVE:  Presents for: Follow-up  Accompanied by: Self  Diabetes education in the past 24mo: Yes  Focus of Visit: Patient Unsure  Diabetes type: Type 2  Disease course: Other  How confident are you filling out medical forms by yourself:: Extremely  Diabetes management related comments/concerns: knowing sugar  "levels  Other concerns:: None  Cultural Influences/Ethnic Background:  Not  or       Diabetes Symptoms & Complications:  Fatigue: No  Neuropathy: No  Polydipsia: Sometimes  Polyphagia: No  Polyuria: No  Visual change: Yes  Slow healing wounds: Sometimes  Complications assessed today?: No  Autonomic neuropathy: No  CVA: No  Heart disease: No  Nephropathy: No  Peripheral neuropathy: No  Peripheral Vascular Disease: No  Retinopathy: Other  Sexual dysfunction: No    Patient Problem List and Family Medical History reviewed for relevant medical history, current medical status, and diabetes risk factors.    Vitals:  There were no vitals taken for this visit.  Estimated body mass index is 31.07 kg/m  as calculated from the following:    Height as of 10/20/22: 1.651 m (5' 5\").    Weight as of 10/20/22: 84.7 kg (186 lb 11.2 oz).   Last 3 BP:   BP Readings from Last 3 Encounters:   10/20/22 108/66   10/12/22 110/80   10/12/22 112/79       History   Smoking Status     Every Day     Packs/day: 0.25     Types: Cigarettes     Start date: 1/1/1990   Smokeless Tobacco     Never       Labs:  Lab Results   Component Value Date    A1C 12.3 10/12/2022     Lab Results   Component Value Date     09/01/2022    GLC 97 11/11/2020     Lab Results   Component Value Date    LDL 56 09/01/2022     Direct Measure HDL   Date Value Ref Range Status   09/01/2022 48 (L) >=50 mg/dL Final   ]  GFR Estimate   Date Value Ref Range Status   09/01/2022 >90 >60 mL/min/1.73m2 Final     Comment:     Effective December 21, 2021 eGFRcr in adults is calculated using the 2021 CKD-EPI creatinine equation which includes age and gender (Savana castillo al., NEJM, DOI: 10.1056/VUOAve9448289)   11/11/2020 89 >60 mL/min/[1.73_m2] Final     Comment:     Starting 12/18/2018, serum creatinine based estimated GFR (eGFR) will be   calculated using the Chronic Kidney Disease Epidemiology Collaboration   (CKD-EPI) equation.       GFR Estimate If Black   Date " Value Ref Range Status   2020 103 >60 mL/min/[1.73_m2] Final     Comment:     Starting 2018, serum creatinine based estimated GFR (eGFR) will be   calculated using the Chronic Kidney Disease Epidemiology Collaboration   (CKD-EPI) equation.       Lab Results   Component Value Date    CR 0.67 2022    CR 0.80 2020     No results found for: MICROALBUMIN    Healthy Eating:  Healthy Eating Assessed Today: Yes  Cultural/Latter day diet restrictions?: No  Meal planning/habits: Avoiding sweets, Low carb, Heart healthy, Low salt, Smaller portions, Keeps food records  How many times a week on average do you eat food made away from home (restaurant/take-out)?: 0  Meals include: Dinner, Morning Snack, Evening Snack  Breakfast: sometimes vegetable (carrots, celery) OR yogurt (days off only) OR 1/2 cup grapes  Lunch: 12pm - veggies (on the run, busy at this time)  Dinner: 5/6pm - veggies (cucumber, squash, peppers, et), chicken/canned beans OR soup  Snacks: hs - veggies w/ hummus OR celery w/ pb OR cuties OR SF canned fruit  Beverages: Water (crystal light)  Has patient met with a dietitian in the past?: No    Being Active:  Being Active Assessed Today: Yes  Exercise:: Currently not exercising  Barrier to exercise: Safety, Physical limitation (spine fracture, degenerative changes)    Monitoring:  Monitoring Assessed Today: Yes  Did patient bring glucose meter to appointment? : Yes  Times checking blood sugar at home (number): 2  Times checking blood sugar at home (per): Day  Blood glucose trend:  (Fastin-130; Before dinner: ; After dinner: )        Taking Medications:  Diabetes Medication(s)     Biguanides       metFORMIN (GLUCOPHAGE XR) 500 MG 24 hr tablet    Take 2 tablets (1,000 mg) by mouth 2 times daily (with meals)          Taking Medication Assessed Today: Yes  Current Treatments: Oral Medication (taken by mouth)  Problems taking diabetes medications regularly?: No  Diabetes  medication side effects?: No    Problem Solving:  Problem Solving Assessed Today: Yes  Is the patient at risk for hypoglycemia?: No  Is the patient at risk for DKA?: No              Reducing Risks:  Reducing Risks Assessed Today: No  CAD Risks: Diabetes Mellitus, Hypertension, Post-menopausal, Sedentary lifestyle, Stress, Tobacco exposure  Has dilated eye exam at least once a year?: No  Sees dentist every 6 months?: No    Healthy Coping:  Healthy Coping Assessed Today: Yes  Emotional response to diabetes: Ready to learn, Concern for health and well-being  Informal Support system:: Friends  Stage of change: ACTION (Actively working towards change)  Support resources: None  Patient Activation Measure Survey Score:  No flowsheet data found.      Care Plan and Education Provided:      YASMANY Warren [facility]      Time Spent: 30 minutes  Encounter Type: Individual    Any diabetes medication dose changes were made via the CDE Protocol per the patient's referring provider. A copy of this encounter was shared with the provider.

## 2022-12-23 ENCOUNTER — OFFICE VISIT (OUTPATIENT)
Dept: OPHTHALMOLOGY | Facility: CLINIC | Age: 47
End: 2022-12-23
Attending: STUDENT IN AN ORGANIZED HEALTH CARE EDUCATION/TRAINING PROGRAM
Payer: COMMERCIAL

## 2022-12-23 DIAGNOSIS — H52.13 MYOPIA OF BOTH EYES WITH ASTIGMATISM AND PRESBYOPIA: ICD-10-CM

## 2022-12-23 DIAGNOSIS — H52.203 MYOPIA OF BOTH EYES WITH ASTIGMATISM AND PRESBYOPIA: ICD-10-CM

## 2022-12-23 DIAGNOSIS — E11.9 TYPE 2 DIABETES MELLITUS WITHOUT RETINOPATHY (H): Primary | ICD-10-CM

## 2022-12-23 DIAGNOSIS — H52.4 MYOPIA OF BOTH EYES WITH ASTIGMATISM AND PRESBYOPIA: ICD-10-CM

## 2022-12-23 PROCEDURE — 92004 COMPRE OPH EXAM NEW PT 1/>: CPT | Mod: GC | Performed by: STUDENT IN AN ORGANIZED HEALTH CARE EDUCATION/TRAINING PROGRAM

## 2022-12-23 PROCEDURE — 92015 DETERMINE REFRACTIVE STATE: CPT

## 2022-12-23 PROCEDURE — G0463 HOSPITAL OUTPT CLINIC VISIT: HCPCS | Mod: 25

## 2022-12-23 ASSESSMENT — TONOMETRY
OS_IOP_MMHG: 15
OD_IOP_MMHG: 16
IOP_METHOD: TONOPEN

## 2022-12-23 ASSESSMENT — REFRACTION_MANIFEST
OD_ADD: +1.75
OS_CYLINDER: +2.50
OS_ADD: +1.75
OD_CYLINDER: +1.25
OS_SPHERE: -9.50
OD_SPHERE: -8.50
OS_AXIS: 180
OD_AXIS: 019

## 2022-12-23 ASSESSMENT — VISUAL ACUITY
OS_PH_CC: 20/20
OS_PH_CC+: -1
OS_CC+: -1
OD_CC+: -2
METHOD: SNELLEN - LINEAR
OS_CC: 20/40
OD_CC: 20/20

## 2022-12-23 ASSESSMENT — SLIT LAMP EXAM - LIDS
COMMENTS: NORMAL
COMMENTS: NORMAL

## 2022-12-23 ASSESSMENT — REFRACTION_WEARINGRX
OD_CYLINDER: +0.75
OD_AXIS: 015
OS_CYLINDER: +1.75
OD_SPHERE: -8.25
OS_AXIS: 006
OS_SPHERE: -9.00
SPECS_TYPE: SVL

## 2022-12-23 ASSESSMENT — PATIENT HEALTH QUESTIONNAIRE - PHQ9: SUM OF ALL RESPONSES TO PHQ QUESTIONS 1-9: 1

## 2022-12-23 ASSESSMENT — CONF VISUAL FIELD
OS_INFERIOR_NASAL_RESTRICTION: 0
OS_NORMAL: 1
OD_SUPERIOR_NASAL_RESTRICTION: 0
OD_INFERIOR_NASAL_RESTRICTION: 0
OD_SUPERIOR_TEMPORAL_RESTRICTION: 0
OD_INFERIOR_TEMPORAL_RESTRICTION: 0
OS_SUPERIOR_NASAL_RESTRICTION: 0
OD_NORMAL: 1
OS_INFERIOR_TEMPORAL_RESTRICTION: 0
METHOD: COUNTING FINGERS
OS_SUPERIOR_TEMPORAL_RESTRICTION: 0

## 2022-12-23 ASSESSMENT — CUP TO DISC RATIO
OS_RATIO: 0.1
OD_RATIO: 0.1

## 2022-12-23 ASSESSMENT — EXTERNAL EXAM - LEFT EYE: OS_EXAM: NORMAL

## 2022-12-23 ASSESSMENT — EXTERNAL EXAM - RIGHT EYE: OD_EXAM: NORMAL

## 2022-12-23 NOTE — PROGRESS NOTES
HPI     Diabetic Eye Exam    Vision fluctuates with blood sugars.  Associated symptoms include blurred vision, foreign body sensation and floaters.  Negative for flashes.  Diabetes characteristics include Type 2 and taking oral medications.  Duration of 2 months.  Pain was noted as 0/10.           Comments    Mariya is here new to clinic for a diabetic eye exam. She was just diagnosed with diabetes in October of this year. She says she has difficulty reading and uses OTC readers.      Lab Results       Component                Value               Date                       A1C                      12.3                10/12/2022            BS today unknown    Zhou Clarke COT 9:07 AM December 23, 2022             Last edited by Zhou Clarke on 12/23/2022  9:07 AM.        CC: Annual eye exam    HPI: She has had fluctuations in vision over the last 6 months, which she attributes to a recent diagnosis of type 2 diabetes and fluctuations in her blood glucose. Her blood glucose has stabilized for the last 2-3 months and with this, she has noticed that her vision has stabilized though is more blurred prior to her diagnosis. She denies recent eye pain, discharge, double vision, flashes, or change in floaters. She is currently on insulin.     Review of systems for the eyes was negative other than the pertinent positives/negatives listed in the HPI.    Lab Results   Component Value Date    A1C 12.3 10/12/2022     Ocular Meds: none    Ocular Hx: refractive error OU    FOHx: glaucoma (mother)    PMHx: +T2DM    Assessment & Plan      Mariya Núñez is a 47 year old female with the following diagnoses:    1. Type 2 diabetes mellitus without retinopathy (H)    2. Myopia of both eyes with astigmatism and presbyopia       T2DM without Retinopathy OU  - Diagnosed: Oct 2022  - strict BP/BG control  - patient understands importance  of good blood glucose control in order to reduce the risk of developing diabetic retinopathy  - yearly  DFE    Myopia of both eyes with astigmatism and presbyopia  - Patient is a high myope; no holes, tears, or detachment noted on exam  - new MRx for glasses dispensed with excellent BCVA    - Counseled return/RD precautions    Patient disposition:   Return in about 1 year (around 12/23/2023) for Annual Visit, or sooner changes.    Karen Saab MD  Ophthalmology Resident, PGY-4    Attending Physician Attestation:  Complete documentation of historical and exam elements from today's encounter can be found in the full encounter summary report (not reduplicated in this progress note).  I personally obtained the chief complaint(s) and history of present illness.  I confirmed and edited as necessary the review of systems, past medical/surgical history, family history, social history, and examination findings as documented by others; and I examined the patient myself.  I personally reviewed the relevant tests, images, and reports as documented above.  I formulated and edited as necessary the assessment and plan and discussed the findings and management plan with the patient and family. . - Cary Bañuelos MD

## 2022-12-23 NOTE — NURSING NOTE
Chief Complaints and History of Present Illnesses   Patient presents with     Diabetic Eye Exam     Chief Complaint(s) and History of Present Illness(es)     Diabetic Eye Exam            Vision: fluctuates with blood sugars    Associated symptoms: blurred vision, foreign body sensation and floaters.  Negative for flashes    Diabetes Type: Type 2 and taking oral medications    Duration: 2 months    Pain scale: 0/10          Comments    Mariya is here new to clinic for a diabetic eye exam. She was just diagnosed with diabetes in October of this year. She says she has difficulty reading and uses OTC readers.      Lab Results       Component                Value               Date                       A1C                      12.3                10/12/2022            BS today unknown    Zhou Clarke COT 9:07 AM December 23, 2022

## 2023-01-12 PROBLEM — R87.810 CERVICAL HIGH RISK HPV (HUMAN PAPILLOMAVIRUS) TEST POSITIVE: Status: ACTIVE | Noted: 2022-10-20

## 2023-01-16 DIAGNOSIS — E11.9 TYPE 2 DIABETES MELLITUS WITHOUT COMPLICATION, WITHOUT LONG-TERM CURRENT USE OF INSULIN (H): ICD-10-CM

## 2023-01-31 ENCOUNTER — MYC MEDICAL ADVICE (OUTPATIENT)
Dept: FAMILY MEDICINE | Facility: CLINIC | Age: 48
End: 2023-01-31
Payer: COMMERCIAL

## 2023-02-01 NOTE — TELEPHONE ENCOUNTER
Please see mychart from patient and advise on appropriate course of action.     Bre Hightower RN    [facility] Triage Nurse    A1C lab order pending for review.

## 2023-02-02 NOTE — TELEPHONE ENCOUNTER
Please schedule Virtual visit on same day slot for diabetes follow up and doing the needful.     Thank you,  Laura Vale MD on 2/2/2023

## 2023-02-02 NOTE — PROGRESS NOTES
[facility] Dermatology Clinic Note - EP    Encounter Date: Feb 3, 2023    Dermatology Problem List:  # Eczematous dermatitis  - triamcinolone 0.1% cream, gentle skincare  # Seborrheic dermatitis  - ketoconazole 2% shampoo   ____________________________________________    Assessment & Plan:     # Eczematous dermatitis, likely atopic dermatitis with a xerotic component  Explained that this is a common dermatologic condition of the skin that is further exacerbated by dry skin. Discussed a gentle skin care regimen to optimize skin barrier and using a topical steroid as needed for flares.  - Start triamcinolone 0.1% cream BID for active eczema  - Moisturize: Recommend good OTC moisturizer to full body, such as Cera-Ve, Tania-cream, or Cetaphil. Advised best to apply after bathing to lock in moisture.  - Gentle skin care handout provided    # Seborrheic dermatitis  Discussed the etiology and natural history of seborrheic dermatitis. Informed the patient that this typically affects areas that have many sebaceous glands, such as the scalp, face, upper chest and back. Also explained that it is caused by a normal yeast that lives in our oil glands and is not contagious.   - Start ketoconazole 2% shampoo TIW    Procedures Performed:   None    Follow-up 5 months in person    Scribe Disclosure:  I, Jasen Quick, am serving as a scribe to document services personally performed by Saúl Huffman MD based on data collection and the provider's statements to me.     Zaria Odonnell DO PGY-4  Department of Dermatology  [facility]    Staff attestation:  The documentation recorded by the scribe accurately reflects the services I personally performed and the decisions I personally made. I have made edits where needed.    Saúl Huffman MD  Staff Dermatologist and Dermatopathologist  , Department of Dermatology    ____________________________________________    CC: Rash (Sx  x 1 yr. Started in breast  area down arms, stomach, back and chest. With opened wounds. Itchy and worsens with heat. Recently Dx with diabetes. Denies anything new. Has not seen allergist yet. Has hay fever. Has used creams does not work.)      HPI:  Ms. Mariya Núñez is a(n) extremely pleasant 47 year old female who presents today as a new patient for rash. Reports a 1 year history of a very itchy rash that began on the chest then spread to the arms and abdomen and took about 7 months to go away. Has changed her laundry detergent without any changes in her skin. She reports showering every other day or so and does not use any moisturizers. Uses tree body wash. Has a history of hay fever but no history of asthma.    Patient is otherwise feeling well, without additional skin concerns.     Labs Reviewed:  N/A    Physical Exam:  Vitals: There were no vitals taken for this visit.  SKIN: Focused examination of the chest, arms, abdomen was performed.  - Hyperpigmented macules of bilateral forearms  - Few pink eczematous papules of the abdomen  - Generalized xerosis  - No other lesions of concern on areas examined.     Medications:  Current Outpatient Medications   Medication     Emollient (CERAVE MOISTURIZING) CREA     ketoconazole (NIZORAL) 2 % external shampoo     triamcinolone (KENALOG) 0.1 % external cream     aspirin (ASA) 325 MG EC tablet     blood glucose monitoring (NO BRAND SPECIFIED) meter device kit     blood glucose monitoring (SOFTCLIX) lancets     buPROPion (WELLBUTRIN XL) 300 MG 24 hr tablet     CONTOUR NEXT TEST test strip     gabapentin (NEURONTIN) 100 MG capsule     metFORMIN (GLUCOPHAGE XR) 500 MG 24 hr tablet     Multiple Vitamin (MULTIVITAMIN ADULT PO)     sertraline (ZOLOFT) 50 MG tablet     topiramate (TOPAMAX) 100 MG tablet     traZODone (DESYREL) 150 MG tablet     vitamin D3 (CHOLECALCIFEROL) 50 mcg (2000 units) tablet     No current facility-administered medications for this visit.      Past Medical History:   Patient  Active Problem List   Diagnosis     Hypokalemia     Hypomagnesemia     Alcohol withdrawal syndrome without complication (H)     Hematemesis, presence of nausea not specified     Alcohol abuse, continuous     Adjustment disorder with mixed anxiety and depressed mood     Smoker     Closed fracture of lumbar vertebra (H)     Diabetes mellitus, type 2 (H)     Cervical high risk HPV (human papillomavirus) test positive     Major depression     Lumbar spondylosis     DDD (degenerative disc disease), lumbar     History reviewed. No pertinent past medical history.    CC Laura Vale MD  [address] [place] [address] [address] [place]UD[place]  [phone] [id number] on close of this encounter.    This note has been created using voice recognition software; while it has been reviewed, some errors may persist.

## 2023-02-02 NOTE — TELEPHONE ENCOUNTER
Patient Contact    Attempt # 1    Was call answered?  No.  Left message on voicemail with information to call triage back at [phone], option 2.     On call back: schedule as advised below by Dr. Kavin Tena RN

## 2023-02-03 ENCOUNTER — OFFICE VISIT (OUTPATIENT)
Dept: FAMILY MEDICINE | Facility: CLINIC | Age: 48
End: 2023-02-03
Attending: INTERNAL MEDICINE
Payer: COMMERCIAL

## 2023-02-03 DIAGNOSIS — L85.3 XEROSIS CUTIS: ICD-10-CM

## 2023-02-03 DIAGNOSIS — L30.8 OTHER ECZEMA: Primary | ICD-10-CM

## 2023-02-03 DIAGNOSIS — L21.9 SEBORRHEIC DERMATITIS: ICD-10-CM

## 2023-02-03 PROCEDURE — 99204 OFFICE O/P NEW MOD 45 MIN: CPT | Mod: GC | Performed by: DERMATOLOGY

## 2023-02-03 RX ORDER — TRIAMCINOLONE ACETONIDE 1 MG/G
CREAM TOPICAL
Qty: 454 G | Refills: 3 | Status: SHIPPED | OUTPATIENT
Start: 2023-02-03 | End: 2024-05-02

## 2023-02-03 RX ORDER — KETOCONAZOLE 20 MG/ML
SHAMPOO TOPICAL
Qty: 120 ML | Refills: 11 | Status: SHIPPED | OUTPATIENT
Start: 2023-02-03 | End: 2023-10-26

## 2023-02-03 RX ORDER — CERAMIDE 1,3,6-II/SALICYLIC/B3
1 CLEANSER (ML) TOPICAL 2 TIMES DAILY
Qty: 453 G | Refills: 11 | Status: SHIPPED | OUTPATIENT
Start: 2023-02-03

## 2023-02-03 ASSESSMENT — PAIN SCALES - GENERAL: PAINLEVEL: NO PAIN (0)

## 2023-02-03 NOTE — PATIENT INSTRUCTIONS
Atopic Dermatitis or Eczema    This is a very common skin condition that is due to an abnormal protein in the skin called filaggrin. There is a genetic association and atopic dermatitis/eczema often runs in families. It is frequrntly also associated with asthma and allergies (hayfever). In this condition, the body can not keep water in the skin. It then gets very dry and irritated. To treat this, we need to get the water back in the skin by using moisturizer at least once daily. We also use topical steroids to help calm down flares.     Medicated ointments/cream:  - triamcinolone 0.1% ointment/cream - 1-2 times daily to rash on body  - if you don't have any active flares/rash, you do not need to use this medication    Moisturizer:  - CeraVe or Cetaphil Moisturizing Cream - 2 times a day to all skin (even when rash is not present, this will help prevent the rash from coming back) Creams are better than lotions (so the ones that come in the jar are better than the pump bottles)    Body wash:  - Cetaphil gentle skin cleanser -- use this instead of soap in the shower    Medicated ointments should be applied first, and then moisturizer on top.     Follow gentle skin care recommendations below.     Gentle Skin Care Recommendations    Bathing   - limit showers to once daily, 15 minutes or less, with warm water   - use gentle soaps that are free of colors and scents and are not too strong of cleansers  - consider limiting soap to only the 'dirty' areas, like the armpits  and groin as much as possible to prevent stripping your skin in other areas of their natural moisture  - do not vigorously scrub when cleansing  - avoid any soaps with microbeads  - after showering, pat your skin gently dry with a towel  - make sure you are applying a good moisturizer after bathing every time (see below)  - do not take bubble baths    Examples of gentle cleansers:   - Mild bar soaps: Vanicream bar soap, Neutrogena glycerin bar, Dove  On call page received from patient this AM at 920AM c/o cough, diarrhea, no appetite, weakness- wants something called in. Has some chest pain from the cough. Denies SOB or GUZMÁN. Denies radiating pain from chest. She is ok with doing virtual visit with Dr. Ginette Rico today. sensitive skin (fragrance-free)   - Mild liquid soaps: Vanicream liquid cleanser, Cerave liquid cleanser    Moisturizing     It is important to moisturize at least twice per day to the whole body. IMMEDIATELY after getting out of the shower, apply a moisturizer (preferably from the list below) to the entire body. If you have been prescribed any medications, apply those medications to the skin first and then you can apply the moisturizer on top.    Moisturizers come in a variety of types some of which are greasier, heavier, or lighter. The heaviest moisturizers are ointments, which are greasy like vaseline. The next best are creams, which are usually white and thick but absorb into the skin a little better. The lightest are lotions which are typically thin and contain more ingredients which can be irritating to your skin. For this reason, we do NOT recommend lotions.     Examples of good moisturizers:   - Ointments: vaseline, Cerave healing ointment, Vaniply, coconut oil   - Creams: Cerave, Vanicream, Neutrogena Norwegian Formula Hand Cream     Laundry     Be sure to use laundry products that are free of dyes and fragrances--many laundry products that claim to be fragrance-free actually are not free of these! Do not use laundry softeners or dryer sheets.     Good laundry products include:  - 7th generation Natural Laundry Detergent Liquid, 7th Generation Free and Clear Natural Laundry Detergent packs, 7th Generation Free and Clear natural 4x Concentrated Laundry Detergent, ECOS hypoallergenic laundry detergent, free & clear, ERA Ultra Era Free Liquid Laundry Detergent, All Free & Clear     Other Gentle Skin Recommendations    - Do not use products such as powders, perfumes, or colognes on your skin  - Avoid saunas and steam baths - these temperatures are too hot   - Avoid tight or  scratchy  clothing such as wool  - Always wash new clothing before wearing them for the first time  - Sometimes a humidifier or  vaporizer, used at night can help the dry skin - but remember to keep it clean to void mold growth.  - Avoid applying essential oils to the skin or diffusing in the home.       Seborrheic Dermatitis    Seborrheic dermatitis is a skin condition that causes redness, scaly or flaky patches, and sometimes itching. It usually affects areas with many oil glands. These include the scalp, face, upper chest, and back. Dandruff is a mild type of seborrheic dermatitis. It is caused by a normal yeast that lives in our oil glands. This condition is not contiguous.      Wash hair with ketoconazole 2% shampoo 3-4 times a week. Leave medication on scalp for 5 minutes before rinsing off.

## 2023-02-03 NOTE — LETTER
2/3/2023         RE: Mariya Núñez  [address]  [facility] [address]        Dear Colleague,    Thank you for referring your patient, Mariya Núñez, to the [facility]. Please see a copy of my visit note below.    [facility] Note - EP    Encounter Date: Feb 3, 2023    Dermatology Problem List:  # Eczematous dermatitis  - triamcinolone 0.1% cream, gentle skincare  # Seborrheic dermatitis  - ketoconazole 2% shampoo   ____________________________________________    Assessment & Plan:     # Eczematous dermatitis, likely atopic dermatitis with a xerotic component  Explained that this is a common dermatologic condition of the skin that is further exacerbated by dry skin. Discussed a gentle skin care regimen to optimize skin barrier and using a topical steroid as needed for flares.  - Start triamcinolone 0.1% cream BID for active eczema  - Moisturize: Recommend good OTC moisturizer to full body, such as Cera-Ve, Tania-cream, or Cetaphil. Advised best to apply after bathing to lock in moisture.  - Gentle skin care handout provided    # Seborrheic dermatitis  Discussed the etiology and natural history of seborrheic dermatitis. Informed the patient that this typically affects areas that have many sebaceous glands, such as the scalp, face, upper chest and back. Also explained that it is caused by a normal yeast that lives in our oil glands and is not contagious.   - Start ketoconazole 2% shampoo TIW    Procedures Performed:   None    Follow-up 5 months in person    Scribe Disclosure:  I, Jasen Quick, am serving as a scribe to document services personally performed by Saúl Huffman MD based on data collection and the provider's statements to me.     Zaria Odonnell DO PGY-4  Department of Dermatology  [facility]    Staff attestation:  The documentation recorded by the scribe accurately reflects the services I personally performed and the decisions I  personally made. I have made edits where needed.    Saúl Huffman MD  Staff Dermatologist and Dermatopathologist  , Department of Dermatology    ____________________________________________    CC: Rash (Sx  x 1 yr. Started in breast area down arms, stomach, back and chest. With opened wounds. Itchy and worsens with heat. Recently Dx with diabetes. Denies anything new. Has not seen allergist yet. Has hay fever. Has used creams does not work.)      HPI:  Ms. Mariya Núñez is a(n) extremely pleasant 47 year old female who presents today as a new patient for rash. Reports a 1 year history of a very itchy rash that began on the chest then spread to the arms and abdomen and took about 7 months to go away. Has changed her laundry detergent without any changes in her skin. She reports showering every other day or so and does not use any moisturizers. Uses tree body wash. Has a history of hay fever but no history of asthma.    Patient is otherwise feeling well, without additional skin concerns.     Labs Reviewed:  N/A    Physical Exam:  Vitals: There were no vitals taken for this visit.  SKIN: Focused examination of the chest, arms, abdomen was performed.  - Hyperpigmented macules of bilateral forearms  - Few pink eczematous papules of the abdomen  - Generalized xerosis  - No other lesions of concern on areas examined.     Medications:  Current Outpatient Medications   Medication     Emollient (CERAVE MOISTURIZING) CREA     ketoconazole (NIZORAL) 2 % external shampoo     triamcinolone (KENALOG) 0.1 % external cream     aspirin (ASA) 325 MG EC tablet     blood glucose monitoring (NO BRAND SPECIFIED) meter device kit     blood glucose monitoring (SOFTCLIX) lancets     buPROPion (WELLBUTRIN XL) 300 MG 24 hr tablet     CONTOUR NEXT TEST test strip     gabapentin (NEURONTIN) 100 MG capsule     metFORMIN (GLUCOPHAGE XR) 500 MG 24 hr tablet     Multiple Vitamin (MULTIVITAMIN ADULT PO)     sertraline  (ZOLOFT) 50 MG tablet     topiramate (TOPAMAX) 100 MG tablet     traZODone (DESYREL) 150 MG tablet     vitamin D3 (CHOLECALCIFEROL) 50 mcg (2000 units) tablet     No current facility-administered medications for this visit.      Past Medical History:   Patient Active Problem List   Diagnosis     Hypokalemia     Hypomagnesemia     Alcohol withdrawal syndrome without complication (H)     Hematemesis, presence of nausea not specified     Alcohol abuse, continuous     Adjustment disorder with mixed anxiety and depressed mood     Smoker     Closed fracture of lumbar vertebra (H)     Diabetes mellitus, type 2 (H)     Cervical high risk HPV (human papillomavirus) test positive     Major depression     Lumbar spondylosis     DDD (degenerative disc disease), lumbar     History reviewed. No pertinent past medical history.    CC Luara Vale MD  [address] [place] [address] [place] [id number] on close of this encounter.    This note has been created using voice recognition software; while it has been reviewed, some errors may persist.       Again, thank you for allowing me to participate in the care of your patient.        Sincerely,        Saúl Huffman MD

## 2023-02-03 NOTE — TELEPHONE ENCOUNTER
Patient Contact    Attempt # 2    Was call answered?  No.  Left message on voicemail with information to call me back.    Upon call back: please assist the patient in scheduling an appointment with Dr. Vale (see note below).    Nuvia Daniels RN

## 2023-02-08 ENCOUNTER — VIRTUAL VISIT (OUTPATIENT)
Dept: FAMILY MEDICINE | Facility: CLINIC | Age: 48
End: 2023-02-08
Payer: COMMERCIAL

## 2023-02-08 DIAGNOSIS — E11.65 TYPE 2 DIABETES MELLITUS WITH HYPERGLYCEMIA, WITHOUT LONG-TERM CURRENT USE OF INSULIN (H): Primary | ICD-10-CM

## 2023-02-08 DIAGNOSIS — M81.0 OSTEOPOROSIS OF VERTEBRA: ICD-10-CM

## 2023-02-08 DIAGNOSIS — N39.0 URINARY TRACT INFECTION WITHOUT HEMATURIA, SITE UNSPECIFIED: ICD-10-CM

## 2023-02-08 DIAGNOSIS — R39.89 URINE DISCOLORATION: ICD-10-CM

## 2023-02-08 DIAGNOSIS — D72.829 LEUKOCYTOSIS, UNSPECIFIED TYPE: ICD-10-CM

## 2023-02-08 PROCEDURE — 99214 OFFICE O/P EST MOD 30 MIN: CPT | Mod: VID | Performed by: INTERNAL MEDICINE

## 2023-02-08 RX ORDER — ALENDRONATE SODIUM 70 MG/1
70 TABLET ORAL
Qty: 4 TABLET | Refills: 4 | Status: SHIPPED | OUTPATIENT
Start: 2023-02-08 | End: 2023-07-05

## 2023-02-08 NOTE — PROGRESS NOTES
Mariya is a 47 year old who is being evaluated via a billable video visit.      How would you like to obtain your AVS? MyChart  If the video visit is dropped, the invitation should be resent by: Text to cell phone: [phone]  Will anyone else be joining your video visit? No      Assessment and Plan  1. Type 2 diabetes mellitus with hyperglycemia, without long-term current use of insulin (H)  Uncontrolled, Last A1c in October 2020 showing 12.3%.  Currently on metformin.  Will recheck in 2 further recommendations. Home  post prandial in the afternoon.  70s - 120s .  Will recheck A1c and possible need of adding a second oral medication if A1c above goal with current metformin 1000 mg extended release twice daily.  Patient understood and agreed with the plan.  - HEMOGLOBIN A1C; Future    2. Osteoporosis of vertebra  Ongoing problem with fracture of the vertebra discussed again to make sure she should take this medication at least 5 years for bone strengthening which she understood and agreed.  She does endorsed that she was out of refills after which she was not taking for the last few weeks.  Sent in refills.  - alendronate (FOSAMAX) 70 MG tablet; Take 1 tablet (70 mg) by mouth every 7 days  Dispense: 4 tablet; Refill: 4    3. Leukocytosis, unspecified type  Ongoing problem, previous WBC count was high possibly due to prednisone at that time, will recheck and make sure it is resolved.  - CBC with platelets and differential; Future    4. Urine discoloration  5. Urinary tract infection without hematuria, site unspecified  New problem, given patient concerns of urine discoloration will check for UTI.  UPDATE - UA positive for moderate LE , started on Bactrim . Sent in for Urine culture.   - UA with Microscopic reflex to Culture - lab collect; Future  - sulfamethoxazole-trimethoprim (BACTRIM DS) 800-160 MG tablet; Take 1 tablet by mouth 2 times daily  Dispense: 20 tablet; Refill: 0  - Urine Culture Aerobic  Bacterial - lab collect; Future       Patient Instructions   As discussed please continue current metformin 1000 mg extended release twice daily, will recheck your A1c and do further recommendations.  Please make a lab only appointment for the labs ordered.    Refill your medications and sent to pharmacy including the alendronate for your osteoporosis.    Return in about 3 months (around 5/8/2023), or if symptoms worsen or fail to improve, for diabetes , Video visit .    Laura Vale MD  [facility] [place] Meraz is a 47 year old presenting for the following health issues:  Diabetes      HPI     Diabetes Follow-up    How often are you checking your blood sugar? One time daily  What time of day are you checking your blood sugars (select all that apply)?  per patient  Have you had any blood sugars above 200?  No  Have you had any blood sugars below 70?  No    What symptoms do you notice when your blood sugar is low?  None    What concerns do you have today about your diabetes? None     Do you have any of these symptoms? (Select all that apply)  No numbness or tingling in feet.  No redness, sores or blisters on feet.  No complaints of excessive thirst.  No reports of blurry vision.  No significant changes to weight.      BP Readings from Last 2 Encounters:   10/20/22 108/66   10/12/22 110/80     Hemoglobin A1C (%)   Date Value   10/12/2022 12.3 (H)     LDL Cholesterol Calculated (mg/dL)   Date Value   09/01/2022 56           How many servings of fruits and vegetables do you eat daily?  2-3    On average, how many sweetened beverages do you drink each day (Examples: soda, juice, sweet tea, etc.  Do NOT count diet or artificially sweetened beverages)?   0    How many days per week do you exercise enough to make your heart beat faster? 3 or less    How many minutes a day do you exercise enough to make your heart beat faster? 9 or less    How many days per week do you miss taking  your medication? 0      Last seen patient for virtual visit for ongoing pruritus and was given referral to dermatology/allergy specialist.  She did have lumbar vertebral fracture detected on the x-ray and was referred to spine referral, but no interventions needed.    She did have Lumbar vertebral fracture on X ray and CT spine confirmed the same with subacute fractures involving the L3, L4 and L5 vertebral bodies. Low PLTS resolved.     No Known Allergies     History reviewed. No pertinent past medical history.    History reviewed. No pertinent surgical history.    Family History   Problem Relation Age of Onset     Eye Surgery Mother      Thyroid Disease Mother      Glaucoma Mother      Hypertension Father      Cancer Father      Diabetes No family hx of      Macular Degeneration No family hx of      Cerebrovascular Disease No family hx of      Anesthesia Reaction No family hx of      Retinal detachment No family hx of      Amblyopia No family hx of      Strabismus No family hx of        Social History     Tobacco Use     Smoking status: Every Day     Packs/day: 0.25     Types: Cigarettes     Start date: 1/1/1990     Smokeless tobacco: Never     Tobacco comments:     4 Cig/day   Substance Use Topics     Alcohol use: Not Currently     Comment: 1 liter Vodka / day since 20 yrs age intermittently ; Last drink 8/13/22        Current Outpatient Medications   Medication     alendronate (FOSAMAX) 70 MG tablet     aspirin (ASA) 325 MG EC tablet     blood glucose monitoring (NO BRAND SPECIFIED) meter device kit     blood glucose monitoring (SOFTCLIX) lancets     buPROPion (WELLBUTRIN XL) 300 MG 24 hr tablet     CONTOUR NEXT TEST test strip     Emollient (CERAVE MOISTURIZING) CREA     gabapentin (NEURONTIN) 100 MG capsule     ketoconazole (NIZORAL) 2 % external shampoo     metFORMIN (GLUCOPHAGE XR) 500 MG 24 hr tablet     Multiple Vitamin (MULTIVITAMIN ADULT PO)     sertraline (ZOLOFT) 50 MG tablet     topiramate (TOPAMAX)  100 MG tablet     traZODone (DESYREL) 150 MG tablet     triamcinolone (KENALOG) 0.1 % external cream     vitamin D3 (CHOLECALCIFEROL) 50 mcg (2000 units) tablet     No current facility-administered medications for this visit.        Review of Systems   Constitutional, HEENT, cardiovascular, pulmonary, GI, , musculoskeletal, neuro, skin, endocrine and psych systems are negative, except as otherwise noted.      Objective           Vitals:  No vitals were obtained today due to virtual visit.    Physical Exam   GENERAL: Healthy, alert and no distress  EYES: Eyes grossly normal to inspection.  No discharge or erythema, or obvious scleral/conjunctival abnormalities.  RESP: No audible wheeze, cough, or visible cyanosis.  No visible retractions or increased work of breathing.    SKIN: Visible skin clear. No significant rash, abnormal pigmentation or lesions.  NEURO: Cranial nerves grossly intact.  Mentation and speech appropriate for age.  PSYCH: Mentation appears normal, affect normal/bright, judgement and insight intact, normal speech and appearance well-groomed.      Labs and imaging reviewed and discussed with the patient.    Video-Visit Details    Type of service:  Video Visit     Originating Location (pt. Location): Home  Distant Location (provider location):  On-site  Platform used for Video Visit: Davon

## 2023-02-08 NOTE — PATIENT INSTRUCTIONS
As discussed please continue current metformin 1000 mg extended release twice daily, will recheck your A1c and do further recommendations.  Please make a lab only appointment for the labs ordered.    Refill your medications and sent to pharmacy including the alendronate for your osteoporosis.

## 2023-02-09 ENCOUNTER — LAB (OUTPATIENT)
Dept: LAB | Facility: CLINIC | Age: 48
End: 2023-02-09
Payer: COMMERCIAL

## 2023-02-09 DIAGNOSIS — Z12.11 SCREEN FOR COLON CANCER: ICD-10-CM

## 2023-02-09 DIAGNOSIS — E11.65 TYPE 2 DIABETES MELLITUS WITH HYPERGLYCEMIA, WITHOUT LONG-TERM CURRENT USE OF INSULIN (H): ICD-10-CM

## 2023-02-09 DIAGNOSIS — D72.829 LEUKOCYTOSIS, UNSPECIFIED TYPE: ICD-10-CM

## 2023-02-09 DIAGNOSIS — N39.0 URINARY TRACT INFECTION WITHOUT HEMATURIA, SITE UNSPECIFIED: ICD-10-CM

## 2023-02-09 DIAGNOSIS — R39.89 URINE DISCOLORATION: ICD-10-CM

## 2023-02-09 LAB
ALBUMIN UR-MCNC: NEGATIVE MG/DL
APPEARANCE UR: CLEAR
BACTERIA #/AREA URNS HPF: ABNORMAL /HPF
BASOPHILS # BLD MANUAL: 0 10E3/UL (ref 0–0.2)
BASOPHILS NFR BLD MANUAL: 0 %
BILIRUB UR QL STRIP: NEGATIVE
CAOX CRY #/AREA URNS HPF: ABNORMAL /HPF
COLOR UR AUTO: YELLOW
EOSINOPHIL # BLD MANUAL: 0.2 10E3/UL (ref 0–0.7)
EOSINOPHIL NFR BLD MANUAL: 2 %
ERYTHROCYTE [DISTWIDTH] IN BLOOD BY AUTOMATED COUNT: 12.9 % (ref 10–15)
GLUCOSE UR STRIP-MCNC: NEGATIVE MG/DL
HBA1C MFR BLD: 5.3 % (ref 0–5.6)
HCT VFR BLD AUTO: 39.9 % (ref 35–47)
HGB BLD-MCNC: 13.4 G/DL (ref 11.7–15.7)
HGB UR QL STRIP: NEGATIVE
KETONES UR STRIP-MCNC: NEGATIVE MG/DL
LEUKOCYTE ESTERASE UR QL STRIP: ABNORMAL
LYMPHOCYTES # BLD MANUAL: 4.6 10E3/UL (ref 0.8–5.3)
LYMPHOCYTES NFR BLD MANUAL: 45 %
MCH RBC QN AUTO: 30.3 PG (ref 26.5–33)
MCHC RBC AUTO-ENTMCNC: 33.6 G/DL (ref 31.5–36.5)
MCV RBC AUTO: 90 FL (ref 78–100)
MONOCYTES # BLD MANUAL: 0.7 10E3/UL (ref 0–1.3)
MONOCYTES NFR BLD MANUAL: 7 %
NEUTROPHILS # BLD MANUAL: 4.7 10E3/UL (ref 1.6–8.3)
NEUTROPHILS NFR BLD MANUAL: 46 %
NITRATE UR QL: NEGATIVE
PH UR STRIP: 5.5 [PH] (ref 5–7)
PLAT MORPH BLD: NORMAL
PLATELET # BLD AUTO: 238 10E3/UL (ref 150–450)
RBC # BLD AUTO: 4.42 10E6/UL (ref 3.8–5.2)
RBC #/AREA URNS AUTO: ABNORMAL /HPF
RBC MORPH BLD: NORMAL
SP GR UR STRIP: 1.01 (ref 1–1.03)
SQUAMOUS #/AREA URNS AUTO: ABNORMAL /LPF
UROBILINOGEN UR STRIP-ACNC: 0.2 E.U./DL
WBC # BLD AUTO: 10.2 10E3/UL (ref 4–11)
WBC #/AREA URNS AUTO: ABNORMAL /HPF

## 2023-02-09 PROCEDURE — 87086 URINE CULTURE/COLONY COUNT: CPT

## 2023-02-09 PROCEDURE — 36415 COLL VENOUS BLD VENIPUNCTURE: CPT

## 2023-02-09 PROCEDURE — 99207 URINE CULTURE: CPT

## 2023-02-09 PROCEDURE — 81001 URINALYSIS AUTO W/SCOPE: CPT

## 2023-02-09 PROCEDURE — 85027 COMPLETE CBC AUTOMATED: CPT

## 2023-02-09 PROCEDURE — 83036 HEMOGLOBIN GLYCOSYLATED A1C: CPT

## 2023-02-09 PROCEDURE — 85007 BL SMEAR W/DIFF WBC COUNT: CPT

## 2023-02-09 RX ORDER — SULFAMETHOXAZOLE/TRIMETHOPRIM 800-160 MG
1 TABLET ORAL 2 TIMES DAILY
Qty: 20 TABLET | Refills: 0 | Status: SHIPPED | OUTPATIENT
Start: 2023-02-09 | End: 2023-04-14

## 2023-02-10 ENCOUNTER — TELEPHONE (OUTPATIENT)
Dept: FAMILY MEDICINE | Facility: CLINIC | Age: 48
End: 2023-02-10
Payer: COMMERCIAL

## 2023-02-10 NOTE — RESULT ENCOUNTER NOTE
Good news! Your A1C is normalized. Continue current medications.     Your Urine exam is positive for UTI. I have sent in antibiotic to your pharmacy. Please start it off ASAP. I will update on your culture results which takes few days. Please let me know if you have any questions.    All your OTHER labs normal, you may see some highlighted which do not have Clinical significance.     Thank you,  Laura Vale MD on 2/9/2023

## 2023-02-10 NOTE — TELEPHONE ENCOUNTER
----- Message from Laura Vale MD sent at 2/9/2023  9:20 PM CST -----  Good news! Your A1C is normalized. Continue current medications.     Your Urine exam is positive for UTI. I have sent in antibiotic to your pharmacy. Please start it off ASAP. I will update on your culture results which takes few days. Please let me know if you have any questions.    All your OTHER labs normal, you may see some highlighted which do not have Clinical significance.     Thank you,  Laura Vale MD on 2/9/2023

## 2023-02-10 NOTE — TELEPHONE ENCOUNTER
Called and spoke to the patient. Relayed message from the provider. Patient expressed understanding and had no additional questions at this time.    Nuvia Daniels RN

## 2023-02-11 LAB
BACTERIA UR CULT: NORMAL
BACTERIA UR CULT: NORMAL

## 2023-04-10 DIAGNOSIS — S32.009D CLOSED FRACTURE OF LUMBAR VERTEBRA WITH ROUTINE HEALING, UNSPECIFIED FRACTURE MORPHOLOGY, UNSPECIFIED LUMBAR VERTEBRAL LEVEL, SUBSEQUENT ENCOUNTER: ICD-10-CM

## 2023-04-11 RX ORDER — CHOLECALCIFEROL (VITAMIN D3) 50 MCG
TABLET ORAL
Qty: 30 TABLET | Refills: 4 | Status: SHIPPED | OUTPATIENT
Start: 2023-04-11 | End: 2023-08-15

## 2023-04-14 ENCOUNTER — OFFICE VISIT (OUTPATIENT)
Dept: FAMILY MEDICINE | Facility: CLINIC | Age: 48
End: 2023-04-14
Payer: COMMERCIAL

## 2023-04-14 VITALS
WEIGHT: 139.2 LBS | HEART RATE: 92 BPM | HEIGHT: 65 IN | SYSTOLIC BLOOD PRESSURE: 94 MMHG | TEMPERATURE: 99.5 F | DIASTOLIC BLOOD PRESSURE: 68 MMHG | RESPIRATION RATE: 18 BRPM | BODY MASS INDEX: 23.19 KG/M2 | OXYGEN SATURATION: 98 %

## 2023-04-14 DIAGNOSIS — S32.009D CLOSED FRACTURE OF LUMBAR VERTEBRA WITH ROUTINE HEALING, UNSPECIFIED FRACTURE MORPHOLOGY, UNSPECIFIED LUMBAR VERTEBRAL LEVEL, SUBSEQUENT ENCOUNTER: ICD-10-CM

## 2023-04-14 DIAGNOSIS — F17.200 NICOTINE DEPENDENCE, UNCOMPLICATED, UNSPECIFIED NICOTINE PRODUCT TYPE: ICD-10-CM

## 2023-04-14 DIAGNOSIS — K59.00 CONSTIPATION, UNSPECIFIED CONSTIPATION TYPE: ICD-10-CM

## 2023-04-14 DIAGNOSIS — R87.810 PAPANICOLAOU SMEAR OF CERVIX WITH POSITIVE HIGH RISK HUMAN PAPILLOMA VIRUS (HPV) TEST: ICD-10-CM

## 2023-04-14 DIAGNOSIS — H91.93 BILATERAL HEARING LOSS, UNSPECIFIED HEARING LOSS TYPE: ICD-10-CM

## 2023-04-14 DIAGNOSIS — Z12.11 SCREEN FOR COLON CANCER: ICD-10-CM

## 2023-04-14 DIAGNOSIS — M51.369 DDD (DEGENERATIVE DISC DISEASE), LUMBAR: ICD-10-CM

## 2023-04-14 DIAGNOSIS — E11.9 TYPE 2 DIABETES MELLITUS WITHOUT COMPLICATION, WITHOUT LONG-TERM CURRENT USE OF INSULIN (H): Primary | ICD-10-CM

## 2023-04-14 PROCEDURE — 99207 PR FOOT EXAM NO CHARGE: CPT | Performed by: INTERNAL MEDICINE

## 2023-04-14 PROCEDURE — 99406 BEHAV CHNG SMOKING 3-10 MIN: CPT | Mod: 25 | Performed by: INTERNAL MEDICINE

## 2023-04-14 PROCEDURE — 99214 OFFICE O/P EST MOD 30 MIN: CPT | Performed by: INTERNAL MEDICINE

## 2023-04-14 RX ORDER — NICOTINE 21 MG/24HR
1 PATCH, TRANSDERMAL 24 HOURS TRANSDERMAL EVERY 24 HOURS
Qty: 42 PATCH | Refills: 0 | Status: SHIPPED | OUTPATIENT
Start: 2023-04-14 | End: 2023-10-26

## 2023-04-14 RX ORDER — METFORMIN HCL 500 MG
TABLET, EXTENDED RELEASE 24 HR ORAL
Qty: 360 TABLET | Refills: 0 | Status: SHIPPED | OUTPATIENT
Start: 2023-04-14 | End: 2023-08-22

## 2023-04-14 RX ORDER — POLYETHYLENE GLYCOL 3350 17 G/17G
1 POWDER, FOR SOLUTION ORAL DAILY
Qty: 500 G | Refills: 1 | Status: SHIPPED | OUTPATIENT
Start: 2023-04-14 | End: 2023-10-26

## 2023-04-14 RX ORDER — HYDROXYZINE HYDROCHLORIDE 25 MG/1
TABLET, FILM COATED ORAL
COMMUNITY
Start: 2023-04-10

## 2023-04-14 RX ORDER — BUPROPION HYDROCHLORIDE 150 MG/1
150 TABLET ORAL DAILY
COMMUNITY
Start: 2023-04-10

## 2023-04-14 ASSESSMENT — PAIN SCALES - GENERAL: PAINLEVEL: MODERATE PAIN (4)

## 2023-04-14 NOTE — PATIENT INSTRUCTIONS
"As discussed , since all your labs done recently will check in physical in 10/2023     Refilled the Metformin as requested.     Will start on Miralax.     Sent in Nicotine patches for improvement on smoking.     Placed referral to OBGYN for abnormal PAP with + HPV.     Sent in muscle relaxor in the night only ( drowsiness in day time )     =============================================    How to Quit Smoking  Smoking is a hard habit to break. About half of all people who've ever smoked have been able to quit. Most people who still smoke want to quit. Here are some of the best ways to stop smoking.     Keep in mind the health benefits of quitting  The health benefits of quitting start right away. They keep improving the longer you go without smoking. Knowing this can help inspire you to stay on track. These benefits occur at any age. If you're 17 or 70, quitting is a good choice. Some of the health benefits after your last cigarette include:   After 20 minutes:  Your blood pressure and pulse return to normal.  After 8 hours: Your oxygen levels return to normal.  After 2 days: Your ability to smell and taste start to improve as damaged nerves regrow.  After 2 to 3 weeks: Your circulation and lung function improve.  After 1 to 9 months: Your coughing, congestion, and shortness of breath decrease. Your tiredness decreases.  After 1 year: Your risk of heart attack decreases by 50%.  After 5 years: Your risk of lung cancer decreases by 50%. Your risk of stroke becomes the same as a nonsmoker s.  What about going cold turkey?  You may have heard about quitting \"cold turkey.\" This means stopping all at once. Going cold turkey is an option. But it's not the most successful way to quit smoking. Also, trying to cut back slowly often doesn't work as well. This may be because it continues the habit of smoking. You may also inhale more smoke while smoking fewer cigarettes. This leads to the same amount of nicotine in your body. "   But quitting cold turkey or cutting back slowly aren't your only choices. Using tobacco cessation medicines with behavioral counseling may be a better option to help you succeed. Talk with your provider about your options for support while you quit smoking.   Get support  Support programs can be a big help, especially for heavy smokers. These groups offer information, ways to change behavior, and peer support. Ask your provider for some resources. Here are some other ways to find support:   Smokefree.gov at www.smokefree.gov  800-QUIT-NOW (908-139-8050)  American Lung Association at www.lung.org/quit-smoking  244.626.7184  American Cancer Society  at www.cancer.org/quitsmoking  176.639.7626  Support at home is important too. Family and friends can offer praise and reassurance. Ask your friends who smoke to support your decision. Try to stay away from situations that trigger the desire to smoke. This may include smoking with your morning coffee. Or smoking after a meal. Create new routines to help decrease your cravings.   If the smoker in your life finds it hard to quit, encourage them to keep trying. Remind them of all of the benefits to themselves and people they love.   Try over-the-counter nicotine replacements   Nicotine replacement therapy may make it easier to quit. You can buy some aids without a prescription. These include a nicotine patch, gum, and lozenges. But it's best to use these under the care of your healthcare provider. The skin patch gives a steady supply of nicotine. Nicotine gum and lozenges give short-time doses of low levels of nicotine. Both methods reduce the craving for cigarettes. If you have upset stomach (nausea), vomiting, dizziness, weakness, or a fast heartbeat, stop using these products and see your provider.   Ask about prescription medicine  After reviewing your smoking patterns and past attempts to quit, your provider may offer a prescription medicine. These include bupropion,  varenicline, a nicotine inhaler, or nasal spray. Each has advantages and side effects. Your provider can go over these with you.   Keep trying  Most smokers try to quit many times before they succeed. It s important not to give up.   Ann last reviewed this educational content on 12/1/2019 2000-2022 [employer]. All rights reserved. This information is not intended as a substitute for professional medical care. Always follow your healthcare professional's instructions.

## 2023-04-14 NOTE — PROGRESS NOTES
Assessment and Plan  1. Type 2 diabetes mellitus without complication, without long-term current use of insulin (H)  Well-controlled, with a very dramatic decrease in her A1c due to her given strict lifestyle modifications and medication compliance which is commendable. Last seen pt on VV on 2/2023 for diabetes follow up. She is here for followup after last visit with her diabetes normalized from 12.3  6 months back >> 5.3 in 2/2023.   - metFORMIN (GLUCOPHAGE XR) 500 MG 24 hr tablet; TAKE 2 TABLETS (1000MG) BY MOUTH TWICE A DAY WITH MEALS  Dispense: 360 tablet; Refill: 0  - FOOT EXAM  - blood glucose (CONTOUR NEXT TEST) test strip; USE TO TEST BLOOD SUGAR THREE TIMES A DAY  Dispense: 100 strip; Refill: 1    2. Constipation, unspecified constipation type  Ongoing problem, uncontrolled.  Patient states that she goes to bowel movement only once in 3 days with a large quantity of stool.  Discussed on normal every day bowel movement and educated her that she will supposed to go every day and given the MiraLAX every night for improvement.  Patient understood and agreed with the plan of hydrating herself well and ambulation for proper bowel movement.  - polyethylene glycol (MIRALAX) 17 GM/Dose powder; Take 17 g (1 capful.) by mouth daily  Dispense: 500 g; Refill: 1    3. Bilateral hearing loss, unspecified hearing loss type  New problem, patient requesting for audiology referral as she is unable to hear many conversations and having hard time.  Will do as requested.  - Adult Audiology  Referral; Future    4. Papanicolaou smear of cervix with positive high risk human papilloma virus (HPV) test  Last Pap smear in October 2022 showing positive for HPV, need for colposcopy for which patient opted for gynecology will do as requested.  - Ob/Gyn Referral    5. Closed fracture of lumbar vertebra with routine healing, unspecified fracture morphology, unspecified lumbar vertebral level, subsequent encounter  6. DDD  (degenerative disc disease), lumbar  Chronic problem, intermittent flareups.  Will give Zanaflex muscle laxer for improvement, discussed on the side effect profile of this medication.  - tiZANidine (ZANAFLEX) 4 MG tablet; Take 1 tablet (4 mg) by mouth nightly as needed for muscle spasms  Dispense: 30 tablet; Refill: 0    7. Screen for colon cancer  - Fecal colorectal cancer screen FIT - Future (S+30); Future  - Fecal colorectal cancer screen FIT - Future (S+30)    8. Nicotine dependence, uncomplicated, unspecified nicotine product type  Currently on 6-7 cig / day . Patient was counseled on smoking cessation, we discussed the benefits of quitting , .Also encouraged to set a stop date. Patient desires to begin pharmacotherapy to prevent withdrawal symptoms and aid in cessation. Will use NRT patch 14mg/day x 2wks followed by 7mg/day x 2wks, in combination with  buproprion for 7-12 wks.  - SMOKING CESSATION COUNSELING 3-10 MIN  - nicotine (NICODERM CQ) 14 MG/24HR 24 hr patch; Place 1 patch onto the skin every 24 hours  Dispense: 42 patch; Refill: 0         Patient Instructions   As discussed , since all your labs done recently will check in physical in 10/2023     Refilled the Metformin as requested.     Will start on Miralax.     Sent in Nicotine patches for improvement on smoking.     Placed referral to OBGYN for abnormal PAP with + HPV.     Sent in muscle relaxor in the night only ( drowsiness in day time )     =============================================    How to Quit Smoking  Smoking is a hard habit to break. About half of all people who've ever smoked have been able to quit. Most people who still smoke want to quit. Here are some of the best ways to stop smoking.     Keep in mind the health benefits of quitting  The health benefits of quitting start right away. They keep improving the longer you go without smoking. Knowing this can help inspire you to stay on track. These benefits occur at any age. If you're 17  "or 70, quitting is a good choice. Some of the health benefits after your last cigarette include:     After 20 minutes:  Your blood pressure and pulse return to normal.    After 8 hours: Your oxygen levels return to normal.    After 2 days: Your ability to smell and taste start to improve as damaged nerves regrow.    After 2 to 3 weeks: Your circulation and lung function improve.    After 1 to 9 months: Your coughing, congestion, and shortness of breath decrease. Your tiredness decreases.    After 1 year: Your risk of heart attack decreases by 50%.    After 5 years: Your risk of lung cancer decreases by 50%. Your risk of stroke becomes the same as a nonsmoker s.  What about going cold turkey?  You may have heard about quitting \"cold turkey.\" This means stopping all at once. Going cold turkey is an option. But it's not the most successful way to quit smoking. Also, trying to cut back slowly often doesn't work as well. This may be because it continues the habit of smoking. You may also inhale more smoke while smoking fewer cigarettes. This leads to the same amount of nicotine in your body.   But quitting cold turkey or cutting back slowly aren't your only choices. Using tobacco cessation medicines with behavioral counseling may be a better option to help you succeed. Talk with your provider about your options for support while you quit smoking.   Get support  Support programs can be a big help, especially for heavy smokers. These groups offer information, ways to change behavior, and peer support. Ask your provider for some resources. Here are some other ways to find support:     Smokefree.gov at www.smokefree.gov  800-QUIT-NOW (484-978-1577)    American Lung Association at www.lung.org/quit-smoking  261.247.7820    American Cancer Society  at www.cancer.org/quitsmoking  222.719.6585  Support at home is important too. Family and friends can offer praise and reassurance. Ask your friends who smoke to support your " decision. Try to stay away from situations that trigger the desire to smoke. This may include smoking with your morning coffee. Or smoking after a meal. Create new routines to help decrease your cravings.   If the smoker in your life finds it hard to quit, encourage them to keep trying. Remind them of all of the benefits to themselves and people they love.   Try over-the-counter nicotine replacements   Nicotine replacement therapy may make it easier to quit. You can buy some aids without a prescription. These include a nicotine patch, gum, and lozenges. But it's best to use these under the care of your healthcare provider. The skin patch gives a steady supply of nicotine. Nicotine gum and lozenges give short-time doses of low levels of nicotine. Both methods reduce the craving for cigarettes. If you have upset stomach (nausea), vomiting, dizziness, weakness, or a fast heartbeat, stop using these products and see your provider.   Ask about prescription medicine  After reviewing your smoking patterns and past attempts to quit, your provider may offer a prescription medicine. These include bupropion, varenicline, a nicotine inhaler, or nasal spray. Each has advantages and side effects. Your provider can go over these with you.   Keep trying  Most smokers try to quit many times before they succeed. It s important not to give up.   medineering last reviewed this educational content on 12/1/2019 2000-2022 [employer]. All rights reserved. This information is not intended as a substitute for professional medical care. Always follow your healthcare professional's instructions.          Return in 6 months (on 10/14/2023), or if symptoms worsen or fail to improve, for Preventative Visit.    Laura Vale MD  [facility] CLAUDIO Meraz is a 47 year old, presenting for the following health issues:  Recheck Medication and Diabetes (Follow up)         View : No data to display.               History of Present Illness       Reason for visit:  General and diabetes check up    She eats 4 or more servings of fruits and vegetables daily.She consumes 0 sweetened beverage(s) daily.She exercises with enough effort to increase her heart rate 9 or less minutes per day.  She exercises with enough effort to increase her heart rate 3 or less days per week.   She is taking medications regularly.     Last seen pt on VV on 2/2023 for diabetes follow up. She is here for followup after last visit with her diabetes normalized from 12.3  6 months back >> 5.3 in 2/2023.      No Known Allergies     History reviewed. No pertinent past medical history.    History reviewed. No pertinent surgical history.    Family History   Problem Relation Age of Onset     Eye Surgery Mother      Thyroid Disease Mother      Glaucoma Mother      Hypertension Father      Cancer Father      Diabetes No family hx of      Macular Degeneration No family hx of      Cerebrovascular Disease No family hx of      Anesthesia Reaction No family hx of      Retinal detachment No family hx of      Amblyopia No family hx of      Strabismus No family hx of        Social History     Tobacco Use     Smoking status: Every Day     Packs/day: 0.25     Types: Cigarettes     Start date: 1/1/1990     Smokeless tobacco: Never     Tobacco comments:     4 Cig/day   Vaping Use     Vaping status: Never Used   Substance Use Topics     Alcohol use: Not Currently     Comment: 1 liter Vodka / day since 20 yrs age intermittently ; Last drink 8/13/22        Current Outpatient Medications   Medication     alendronate (FOSAMAX) 70 MG tablet     aspirin (ASA) 325 MG EC tablet     blood glucose (CONTOUR NEXT TEST) test strip     buPROPion (WELLBUTRIN XL) 150 MG 24 hr tablet     buPROPion (WELLBUTRIN XL) 300 MG 24 hr tablet     Emollient (CERAVE MOISTURIZING) CREA     gabapentin (NEURONTIN) 100 MG capsule     ketoconazole (NIZORAL) 2 % external shampoo     metFORMIN  "(GLUCOPHAGE XR) 500 MG 24 hr tablet     Multiple Vitamin (MULTIVITAMIN ADULT PO)     nicotine (NICODERM CQ) 14 MG/24HR 24 hr patch     polyethylene glycol (MIRALAX) 17 GM/Dose powder     sertraline (ZOLOFT) 50 MG tablet     tiZANidine (ZANAFLEX) 4 MG tablet     topiramate (TOPAMAX) 100 MG tablet     traZODone (DESYREL) 150 MG tablet     triamcinolone (KENALOG) 0.1 % external cream     vitamin D3 (CHOLECALCIFEROL) 50 mcg (2000 units) tablet     blood glucose monitoring (NO BRAND SPECIFIED) meter device kit     blood glucose monitoring (SOFTCLIX) lancets     hydrOXYzine (ATARAX) 25 MG tablet     No current facility-administered medications for this visit.        Review of Systems   Constitutional, HEENT, cardiovascular, pulmonary, GI, , musculoskeletal, neuro, skin, endocrine and psych systems are negative, except as otherwise noted.      Objective    BP 94/68   Pulse 92   Temp 99.5  F (37.5  C) (Tympanic)   Resp 18   Ht 1.651 m (5' 5\")   Wt 63.1 kg (139 lb 3.2 oz)   SpO2 98%   BMI 23.16 kg/m    Body mass index is 23.16 kg/m .  Physical Exam   GENERAL: healthy, alert and no distress  NECK: no adenopathy, no asymmetry, masses, or scars and thyroid normal to palpation  RESP: lungs clear to auscultation - no rales, rhonchi or wheezes  CV: regular rate and rhythm, normal S1 S2, no S3 or S4, no murmur, click or rub, no peripheral edema and peripheral pulses strong  ABDOMEN: soft, nontender, no hepatosplenomegaly, no masses and bowel sounds normal  MS: no gross musculoskeletal defects noted, no edema    FOOT exam : Sensations on monofilament exam intact bilaterally. No ulcers or macerations on the foot.    "

## 2023-04-17 NOTE — TELEPHONE ENCOUNTER
FYI to provider - Patient is lost to pap tracking follow-up. Attempts to contact pt have been made per reminder process and there has been no reply and/or no appt scheduled. Contact hx listed below.     2009 NIL pap  2017 ASCUS pap, + HR HPV (not 16 or 18).  10/12/17 COLP and ECC- Negative for dysplasia  10/20/22 NIL pap, + HR HPV (not 16 or 18). Plan colp due by 1/20/23  10/28/22 LM on . [employer] message sent. Pt read results.   12/19/22 Reminder letter (2 month)  1/12/23 [place] not done. Tracking updated for 6 mo colp/pap due 4/20/23.   3/17/23 Reminder [employer]- Pt read.  4/17/23 Lost to follow up

## 2023-05-16 ENCOUNTER — MYC MEDICAL ADVICE (OUTPATIENT)
Dept: FAMILY MEDICINE | Facility: CLINIC | Age: 48
End: 2023-05-16
Payer: COMMERCIAL

## 2023-05-16 DIAGNOSIS — E11.9 TYPE 2 DIABETES MELLITUS WITHOUT COMPLICATION, WITHOUT LONG-TERM CURRENT USE OF INSULIN (H): Primary | ICD-10-CM

## 2023-05-17 ENCOUNTER — NURSE TRIAGE (OUTPATIENT)
Dept: FAMILY MEDICINE | Facility: CLINIC | Age: 48
End: 2023-05-17
Payer: COMMERCIAL

## 2023-05-17 NOTE — TELEPHONE ENCOUNTER
"Nurse Triage SBAR    Is this a 2nd Level Triage? YES, LICENSED PRACTITIONER REVIEW IS REQUIRED    Situation: Spoke with patient regarding 5/16/2023 MyChart encounter detailing higher BG readings and some worsening blurry vision. Patient stated she has noticed eyesight becoming more blurry over the last 4 to 5 days. Patient endorses vision both near and far have become more blurry but does not endorse complete vision impairment or weakness.     Background: Patient stated her BG readings have been higher recently with readings ranging from 118-130.    Assessment: Patient does not endorse chest pain/pressure, no difficulty breathing, no weakness on one side of the body, and no dizziness or lightheadedness. Patient stated she has had an off and on headache that she rated as a 5.     Protocol Recommended Disposition:   Go To ED/UCC Now (Or To Office With PCP Approval)    Recommendation: Per disposition, RN advised patient to be seen in ED for immediate evaluation. Patient refused disposition stating, \"I don't think this is concerning, I don't need to go to the Emergency Room for this\". RN again advised patient to be seen emergently, patient again refused disposition. RN stated they will get provider recommendations on current symptoms, patient agreed to wait for provider response back.     Routed to provider.     Does the patient meet one of the following criteria for ADS visit consideration? 16+ years old, with an [facility] PCP     TIP  Providers, please consider if this condition is appropriate for management at one of our Acute and Diagnostic Services sites.     If patient is a good candidate, please use dotphrase <dot>triageresponse and select Refer to ADS to document.      Reason for Disposition    Blurred vision or visual changes and present now and sudden onset or new (e.g., minutes, hours, days)  (Exception: Seeing floaters / black specks OR previously diagnosed migraine headaches with same symptoms.)    Additional " Information    Negative: Weakness of the face, arm or leg on one side of the body    Negative: Followed getting substance in the eye    Negative: Foreign body or object is or was lodged in the eye    Negative: Followed an eye injury    Negative: Followed sun lamp or sun exposure (UV keratitis)    Negative: Yellow or green discharge (pus) in the eye    Negative: Pregnant    Negative: Complete loss of vision in 1 or both eyes    Negative: SEVERE eye pain    Negative: Severe headache    Negative: Double vision    Protocols used: VISION LOSS OR CHANGE-A-OH

## 2023-05-17 NOTE — TELEPHONE ENCOUNTER
Please see mychart from patient and advise appropriate course of action.      Jeremiah Heaton RN  [facility] Triage Nurse

## 2023-05-18 NOTE — TELEPHONE ENCOUNTER
Provider Response to 2nd Level Triage Request    I have reviewed the RN documentation. My recommendation is:  Refer to Urgent Care

## 2023-05-18 NOTE — TELEPHONE ENCOUNTER
Provider Recommendation Follow Up:   Reached patient/caregiver. Informed of provider's recommendations. Patient verbalized understanding and does not agree with the plan.     Pt does not agree. She states she is going to work now. Pt urged to seek immediate attention if symptoms worsen.    Smita ELMORE RN  [facility] Triage Team

## 2023-07-03 ENCOUNTER — MYC MEDICAL ADVICE (OUTPATIENT)
Dept: FAMILY MEDICINE | Facility: CLINIC | Age: 48
End: 2023-07-03
Payer: COMMERCIAL

## 2023-07-03 DIAGNOSIS — M81.0 OSTEOPOROSIS OF VERTEBRA: ICD-10-CM

## 2023-07-05 RX ORDER — ALENDRONATE SODIUM 70 MG/1
70 TABLET ORAL
Qty: 4 TABLET | Refills: 4 | Status: SHIPPED | OUTPATIENT
Start: 2023-07-05 | End: 2023-10-26

## 2023-08-10 ENCOUNTER — OFFICE VISIT (OUTPATIENT)
Dept: AUDIOLOGY | Facility: CLINIC | Age: 48
End: 2023-08-10
Attending: INTERNAL MEDICINE
Payer: COMMERCIAL

## 2023-08-10 DIAGNOSIS — H93.8X3 SENSATION OF FULLNESS IN BOTH EARS: ICD-10-CM

## 2023-08-10 DIAGNOSIS — H90.3 SENSORINEURAL HEARING LOSS, BILATERAL: Primary | ICD-10-CM

## 2023-08-10 DIAGNOSIS — H91.93 BILATERAL HEARING LOSS, UNSPECIFIED HEARING LOSS TYPE: ICD-10-CM

## 2023-08-10 PROCEDURE — 92557 COMPREHENSIVE HEARING TEST: CPT | Performed by: AUDIOLOGIST

## 2023-08-10 PROCEDURE — 92550 TYMPANOMETRY & REFLEX THRESH: CPT | Performed by: AUDIOLOGIST

## 2023-08-10 NOTE — PROGRESS NOTES
AUDIOLOGY REPORT    SUBJECTIVE:  Mariya Núñez is a 47 year old female who was seen in the Audiology Clinic at the [facility] for audiologic evaluation, referred by Laura Vale M.D. The patient reports difficulty hearing and understanding conversations for the past 1-2 years, which affects her ability to work effectively. This decline in hearing has been gradual. She endorsed bilateral aural fullness/muffled sensation, and recently experienced excessive ear popping/otalgia during air travel. She endorsed likely orthostatic dizziness, and denied true vertigo. She also denied current otalgia, otorrhea, recent illness, or history of noise exposure. She has type 2 diabetes, which is documented as well-controlled, and nicotine dependence.    Of note, patient was anxious and emotionally labile on arrival, and became tearful while history was being taken. Time was allowed for her to calm and collect her thoughts. She seemed disoriented at times and thoughts/statements were not always congruent with test circumstances. Possible visual difficulties were also noted.    OBJECTIVE:  Abuse Screening:  Do you feel unsafe at home or work/school? No  Do you feel threatened by someone? No  Does anyone try to keep you from having contact with others, or doing things outside of your home? No  Physical signs of abuse present? No     Fall Risk Screen:  1. Have you fallen two or more times in the past year? No  2. Have you fallen and had an injury in the past year? No    Timed Up and Go Score (in seconds): not tested  Is patient a fall risk? No  Referral initiated: No  Fall Risk Assessment Completed by Audiology    Otoscopic exam indicates ears are clear of cerumen bilaterally.     Pure Tone Thresholds assessed using conventional audiometry with good-fair reliability from 250-8000 Hz bilaterally using insert and circumaural earphones .    RIGHT:  borderline-normal sloping to moderate-severe sensorineural  hearing loss    LEFT:    borderline-normal sloping to moderate sensorineural hearing loss    Tympanogram:    RIGHT: normal eardrum mobility    LEFT:   normal eardrum mobility    Reflexes for 1000 Hz (reported by stimulus ear):  RIGHT: Ipsilateral is present at normal levels  RIGHT: Contralateral is present at normal levels  LEFT:   Ipsilateral is present at normal levels  LEFT:   Contralateral is present at normal levels      Speech Reception Threshold:    RIGHT: 40 dB HL    LEFT:   30 dB HL  Word Recognition Score:     RIGHT: 80% at 80 dB HL using NU-6 recorded word list.    LEFT:   96% at 75 dB HL using NU-6 recorded word list.      ASSESSMENT:     ICD-10-CM    1. Sensorineural hearing loss, bilateral  H90.3       2. Bilateral hearing loss, unspecified hearing loss type  H91.93 Adult Audiology  Referral      3. Sensation of fullness in both ears  H93.8X3           Today s results were discussed with the patient in detail. Appropriate communication strategies were discussed at length, as were reasonable expectations regarding hearing at a distance, in noisy environments, or when attention is diverted elsewhere.      PLAN:  Patient was counseled regarding hearing loss and impact on communication.  Patient is a good candidate for binaural amplification at this time (either prescription or over the counter). Ms. Núñez was encouraged to check on her amplification benefits with her health insurance carrier, to determine her financial responsibility as well as where those benefits may be used. She was provided with a copy of today's audiogram, and information on the [facility] hearing aid program and a handout comparing prescription and OTC amplification.     Ms. Núñez should return annually for hearing testing to monitor current hearing thresholds. Wear hearing protection consistently in noise to preserve residual hearing sensitivity and to minimize the effects of tinnitus. Keep volume levels on  audio devices at or below the FDC point for safer listening. Ms. Núñez expressed verbal understanding of this information and plan. Please call this clinic with questions regarding these results or recommendations.        Alberto Jin, [facility]  [place] Licensed Audiologist [id number]

## 2023-08-15 DIAGNOSIS — S32.009D CLOSED FRACTURE OF LUMBAR VERTEBRA WITH ROUTINE HEALING, UNSPECIFIED FRACTURE MORPHOLOGY, UNSPECIFIED LUMBAR VERTEBRAL LEVEL, SUBSEQUENT ENCOUNTER: ICD-10-CM

## 2023-08-15 RX ORDER — CHOLECALCIFEROL (VITAMIN D3) 50 MCG
TABLET ORAL
Qty: 30 TABLET | Refills: 3 | Status: SHIPPED | OUTPATIENT
Start: 2023-08-15 | End: 2023-12-08

## 2023-08-22 DIAGNOSIS — E11.9 TYPE 2 DIABETES MELLITUS WITHOUT COMPLICATION, WITHOUT LONG-TERM CURRENT USE OF INSULIN (H): ICD-10-CM

## 2023-08-22 RX ORDER — METFORMIN HCL 500 MG
TABLET, EXTENDED RELEASE 24 HR ORAL
Qty: 120 TABLET | Refills: 0 | Status: SHIPPED | OUTPATIENT
Start: 2023-08-22 | End: 2023-09-19

## 2023-09-01 ENCOUNTER — MYC MEDICAL ADVICE (OUTPATIENT)
Dept: FAMILY MEDICINE | Facility: CLINIC | Age: 48
End: 2023-09-01
Payer: COMMERCIAL

## 2023-09-17 ENCOUNTER — HEALTH MAINTENANCE LETTER (OUTPATIENT)
Age: 48
End: 2023-09-17

## 2023-09-19 DIAGNOSIS — E11.9 TYPE 2 DIABETES MELLITUS WITHOUT COMPLICATION, WITHOUT LONG-TERM CURRENT USE OF INSULIN (H): ICD-10-CM

## 2023-09-19 RX ORDER — METFORMIN HCL 500 MG
TABLET, EXTENDED RELEASE 24 HR ORAL
Qty: 120 TABLET | Refills: 0 | Status: SHIPPED | OUTPATIENT
Start: 2023-09-19 | End: 2023-10-17

## 2023-10-10 DIAGNOSIS — E11.9 TYPE 2 DIABETES MELLITUS WITHOUT COMPLICATION, WITHOUT LONG-TERM CURRENT USE OF INSULIN (H): ICD-10-CM

## 2023-10-10 NOTE — TELEPHONE ENCOUNTER
Prescription approved per [facility] Refill Protocol.  Nuvia Daniels, RN  [facility] Triage Nurse

## 2023-10-16 ENCOUNTER — PATIENT OUTREACH (OUTPATIENT)
Dept: CARE COORDINATION | Facility: CLINIC | Age: 48
End: 2023-10-16
Payer: COMMERCIAL

## 2023-10-17 DIAGNOSIS — E11.9 TYPE 2 DIABETES MELLITUS WITHOUT COMPLICATION, WITHOUT LONG-TERM CURRENT USE OF INSULIN (H): ICD-10-CM

## 2023-10-17 RX ORDER — METFORMIN HCL 500 MG
TABLET, EXTENDED RELEASE 24 HR ORAL
Qty: 120 TABLET | Refills: 0 | Status: SHIPPED | OUTPATIENT
Start: 2023-10-17 | End: 2023-12-08

## 2023-10-26 ENCOUNTER — LAB (OUTPATIENT)
Dept: FAMILY MEDICINE | Facility: CLINIC | Age: 48
End: 2023-10-26

## 2023-10-26 ENCOUNTER — ANCILLARY PROCEDURE (OUTPATIENT)
Dept: GENERAL RADIOLOGY | Facility: CLINIC | Age: 48
End: 2023-10-26
Attending: INTERNAL MEDICINE
Payer: COMMERCIAL

## 2023-10-26 ENCOUNTER — OFFICE VISIT (OUTPATIENT)
Dept: FAMILY MEDICINE | Facility: CLINIC | Age: 48
End: 2023-10-26
Payer: COMMERCIAL

## 2023-10-26 VITALS
TEMPERATURE: 97.2 F | HEIGHT: 65 IN | OXYGEN SATURATION: 98 % | SYSTOLIC BLOOD PRESSURE: 86 MMHG | WEIGHT: 126 LBS | HEART RATE: 85 BPM | DIASTOLIC BLOOD PRESSURE: 63 MMHG | BODY MASS INDEX: 20.99 KG/M2

## 2023-10-26 DIAGNOSIS — Z00.00 ROUTINE GENERAL MEDICAL EXAMINATION AT A HEALTH CARE FACILITY: Primary | ICD-10-CM

## 2023-10-26 DIAGNOSIS — R10.30 LOWER ABDOMINAL PAIN: ICD-10-CM

## 2023-10-26 DIAGNOSIS — R87.619 ABNORMAL CERVICAL PAPANICOLAOU SMEAR, UNSPECIFIED ABNORMAL PAP FINDING: ICD-10-CM

## 2023-10-26 DIAGNOSIS — Z00.00 ROUTINE GENERAL MEDICAL EXAMINATION AT A HEALTH CARE FACILITY: ICD-10-CM

## 2023-10-26 DIAGNOSIS — Z12.11 SCREEN FOR COLON CANCER: ICD-10-CM

## 2023-10-26 DIAGNOSIS — Z23 HIGH PRIORITY FOR 2019-NCOV VACCINE: ICD-10-CM

## 2023-10-26 DIAGNOSIS — M54.50 ACUTE BILATERAL LOW BACK PAIN, UNSPECIFIED WHETHER SCIATICA PRESENT: ICD-10-CM

## 2023-10-26 DIAGNOSIS — E11.42 TYPE 2 DIABETES MELLITUS WITH DIABETIC POLYNEUROPATHY, WITHOUT LONG-TERM CURRENT USE OF INSULIN (H): ICD-10-CM

## 2023-10-26 DIAGNOSIS — F10.21 ALCOHOL DEPENDENCE IN REMISSION (H): ICD-10-CM

## 2023-10-26 DIAGNOSIS — F17.200 NICOTINE DEPENDENCE, UNCOMPLICATED, UNSPECIFIED NICOTINE PRODUCT TYPE: ICD-10-CM

## 2023-10-26 DIAGNOSIS — K59.00 CONSTIPATION, UNSPECIFIED CONSTIPATION TYPE: ICD-10-CM

## 2023-10-26 DIAGNOSIS — S32.009D CLOSED FRACTURE OF LUMBAR VERTEBRA WITH ROUTINE HEALING, UNSPECIFIED FRACTURE MORPHOLOGY, UNSPECIFIED LUMBAR VERTEBRAL LEVEL, SUBSEQUENT ENCOUNTER: ICD-10-CM

## 2023-10-26 DIAGNOSIS — E11.9 TYPE 2 DIABETES MELLITUS WITHOUT COMPLICATION, WITHOUT LONG-TERM CURRENT USE OF INSULIN (H): ICD-10-CM

## 2023-10-26 DIAGNOSIS — M81.0 OSTEOPOROSIS OF VERTEBRA: ICD-10-CM

## 2023-10-26 DIAGNOSIS — Z23 NEED FOR PROPHYLACTIC VACCINATION AND INOCULATION AGAINST INFLUENZA: ICD-10-CM

## 2023-10-26 DIAGNOSIS — N17.9 AKI (ACUTE KIDNEY INJURY) (H): ICD-10-CM

## 2023-10-26 LAB
ALBUMIN UR-MCNC: NEGATIVE MG/DL
APPEARANCE UR: CLEAR
BILIRUB UR QL STRIP: NEGATIVE
COLOR UR AUTO: YELLOW
ERYTHROCYTE [DISTWIDTH] IN BLOOD BY AUTOMATED COUNT: 12.7 % (ref 10–15)
GLUCOSE UR STRIP-MCNC: NEGATIVE MG/DL
HBA1C MFR BLD: 5.1 % (ref 0–5.6)
HCT VFR BLD AUTO: 41.2 % (ref 35–47)
HGB BLD-MCNC: 13.3 G/DL (ref 11.7–15.7)
HGB UR QL STRIP: NEGATIVE
HYALINE CASTS #/AREA URNS LPF: ABNORMAL /LPF
KETONES UR STRIP-MCNC: NEGATIVE MG/DL
LEUKOCYTE ESTERASE UR QL STRIP: NEGATIVE
MCH RBC QN AUTO: 30.4 PG (ref 26.5–33)
MCHC RBC AUTO-ENTMCNC: 32.3 G/DL (ref 31.5–36.5)
MCV RBC AUTO: 94 FL (ref 78–100)
NITRATE UR QL: NEGATIVE
PH UR STRIP: 7.5 [PH] (ref 5–7)
PLATELET # BLD AUTO: 219 10E3/UL (ref 150–450)
RBC # BLD AUTO: 4.37 10E6/UL (ref 3.8–5.2)
RBC #/AREA URNS AUTO: ABNORMAL /HPF
SP GR UR STRIP: 1.02 (ref 1–1.03)
SQUAMOUS #/AREA URNS AUTO: ABNORMAL /LPF
UROBILINOGEN UR STRIP-ACNC: 0.2 E.U./DL
WBC # BLD AUTO: 5.9 10E3/UL (ref 4–11)
WBC #/AREA URNS AUTO: ABNORMAL /HPF

## 2023-10-26 PROCEDURE — 80061 LIPID PANEL: CPT | Performed by: INTERNAL MEDICINE

## 2023-10-26 PROCEDURE — 82306 VITAMIN D 25 HYDROXY: CPT | Performed by: INTERNAL MEDICINE

## 2023-10-26 PROCEDURE — 90480 ADMN SARSCOV2 VAC 1/ONLY CMP: CPT | Performed by: INTERNAL MEDICINE

## 2023-10-26 PROCEDURE — 82570 ASSAY OF URINE CREATININE: CPT | Performed by: INTERNAL MEDICINE

## 2023-10-26 PROCEDURE — 90471 IMMUNIZATION ADMIN: CPT | Performed by: INTERNAL MEDICINE

## 2023-10-26 PROCEDURE — 99406 BEHAV CHNG SMOKING 3-10 MIN: CPT | Mod: 25 | Performed by: INTERNAL MEDICINE

## 2023-10-26 PROCEDURE — 85027 COMPLETE CBC AUTOMATED: CPT | Performed by: INTERNAL MEDICINE

## 2023-10-26 PROCEDURE — 81001 URINALYSIS AUTO W/SCOPE: CPT | Performed by: INTERNAL MEDICINE

## 2023-10-26 PROCEDURE — 99396 PREV VISIT EST AGE 40-64: CPT | Mod: 25 | Performed by: INTERNAL MEDICINE

## 2023-10-26 PROCEDURE — 91320 SARSCV2 VAC 30MCG TRS-SUC IM: CPT | Performed by: INTERNAL MEDICINE

## 2023-10-26 PROCEDURE — 82043 UR ALBUMIN QUANTITATIVE: CPT | Performed by: INTERNAL MEDICINE

## 2023-10-26 PROCEDURE — 83036 HEMOGLOBIN GLYCOSYLATED A1C: CPT | Performed by: INTERNAL MEDICINE

## 2023-10-26 PROCEDURE — 36415 COLL VENOUS BLD VENIPUNCTURE: CPT | Performed by: INTERNAL MEDICINE

## 2023-10-26 PROCEDURE — 90686 IIV4 VACC NO PRSV 0.5 ML IM: CPT | Performed by: INTERNAL MEDICINE

## 2023-10-26 PROCEDURE — 99215 OFFICE O/P EST HI 40 MIN: CPT | Mod: 25 | Performed by: INTERNAL MEDICINE

## 2023-10-26 PROCEDURE — 80053 COMPREHEN METABOLIC PANEL: CPT | Performed by: INTERNAL MEDICINE

## 2023-10-26 PROCEDURE — 74018 RADEX ABDOMEN 1 VIEW: CPT | Mod: TC | Performed by: RADIOLOGY

## 2023-10-26 RX ORDER — LANCETS
EACH MISCELLANEOUS
Qty: 100 EACH | Refills: 4 | Status: SHIPPED | OUTPATIENT
Start: 2023-10-26

## 2023-10-26 RX ORDER — POLYETHYLENE GLYCOL 3350 17 G/17G
17 POWDER, FOR SOLUTION ORAL DAILY
Qty: 500 G | Refills: 1 | Status: SHIPPED | OUTPATIENT
Start: 2023-10-26 | End: 2024-01-09

## 2023-10-26 RX ORDER — BLOOD-GLUCOSE METER
EACH MISCELLANEOUS
Qty: 1 EACH | Refills: 0 | Status: SHIPPED | OUTPATIENT
Start: 2023-10-26

## 2023-10-26 RX ORDER — METHYLPREDNISOLONE 4 MG
TABLET, DOSE PACK ORAL
Qty: 21 TABLET | Refills: 0 | Status: SHIPPED | OUTPATIENT
Start: 2023-10-26 | End: 2024-05-02

## 2023-10-26 RX ORDER — NICOTINE 21 MG/24HR
1 PATCH, TRANSDERMAL 24 HOURS TRANSDERMAL EVERY 24 HOURS
Qty: 42 PATCH | Refills: 0 | Status: SHIPPED | OUTPATIENT
Start: 2023-10-26 | End: 2024-05-02

## 2023-10-26 RX ORDER — ALENDRONATE SODIUM 70 MG/1
70 TABLET ORAL
Qty: 4 TABLET | Refills: 4 | Status: SHIPPED | OUTPATIENT
Start: 2023-10-26 | End: 2023-10-28

## 2023-10-26 RX ORDER — BLOOD-GLUCOSE METER
EACH MISCELLANEOUS
COMMUNITY
Start: 2022-10-31 | End: 2024-05-02

## 2023-10-26 ASSESSMENT — ENCOUNTER SYMPTOMS
JOINT SWELLING: 0
HEARTBURN: 0
WEAKNESS: 0
SHORTNESS OF BREATH: 0
NERVOUS/ANXIOUS: 0
DYSURIA: 0
MYALGIAS: 0
HEMATURIA: 0
HEADACHES: 0
FREQUENCY: 0
SORE THROAT: 0
DIARRHEA: 0
ABDOMINAL PAIN: 1
FEVER: 0
EYE PAIN: 0
NAUSEA: 0
PALPITATIONS: 0
DIZZINESS: 0
CHILLS: 0
COUGH: 0
ARTHRALGIAS: 0
CONSTIPATION: 1
HEMATOCHEZIA: 0
PARESTHESIAS: 0

## 2023-10-26 ASSESSMENT — PATIENT HEALTH QUESTIONNAIRE - PHQ9
SUM OF ALL RESPONSES TO PHQ QUESTIONS 1-9: 0
10. IF YOU CHECKED OFF ANY PROBLEMS, HOW DIFFICULT HAVE THESE PROBLEMS MADE IT FOR YOU TO DO YOUR WORK, TAKE CARE OF THINGS AT HOME, OR GET ALONG WITH OTHER PEOPLE: NOT DIFFICULT AT ALL
SUM OF ALL RESPONSES TO PHQ QUESTIONS 1-9: 0

## 2023-10-26 ASSESSMENT — PAIN SCALES - GENERAL: PAINLEVEL: NO PAIN (0)

## 2023-10-26 NOTE — COMMUNITY RESOURCES LIST (ENGLISH)
10/26/2023   [facility] - Outpatient Clinics  N/A  For additional resource needs, please contact your health insurance member services or your primary care team.  Phone: [phone]   Email: N/A   Address: [facility] [place] [address]   Hours: N/A        Financial Stability       Rent and mortgage payment assistance  1  [facility] [employer] ([employer]) Distance: 2.76 miles      In-Person   [address] [place] [address]  Language: English  Hours: Mon - Fri 9:00 AM - 4:00 PM  Fees: Free   Phone: (980) 883-1305 Email: info@[employer].Pending sale to [facility].mn. Website: http://[email or web address].org/     2  [address] [place] [address] Distance: 3.8 miles      In-Person, Phone/Virtual   [address] [facility] [address] [address]  Language: English, Slovenian  Hours: Mon - Thu 9:00 AM - 4:00 PM  Fees: Free   Phone: (393) 194-9784 Email: info@[facility].org Website: https://UserApp.VONTRAVEL/          Important Numbers & Websites       [facility]   211 211itedway.org  Poison Control   (205) 473-2180 [place]  Suicide and Crisis Lifeline   [address] [place], [place] [address]line.org  Childhelp [place] Child Abuse Hotline   170.804.6758 Childhelphotline.org  [place] Sexual Assault Hotline   (812) 826-7135 (HOPE) [facility].org  National Runaway Safeline   (415) 402-9549 (RUNAWAY) [facility].org  Pregnancy & Postpartum Support [place]   Call/text 274-698-4446 Ppsupportmn.org  Substance Abuse National Helpline ([facility]   180-913-HELP (7444) Findtreatment.gov  Emergency Services   911

## 2023-10-26 NOTE — PROGRESS NOTES
SUBJECTIVE:   CC: Mariya is an 48 year old who presents for preventive health visit.       10/26/2023    10:49 AM   Additional Questions   Roomed by barrington       Healthy Habits:     Getting at least 3 servings of Calcium per day:  Yes    Bi-annual eye exam:  Yes    Dental care twice a year:  NO    Sleep apnea or symptoms of sleep apnea:  None    Diet:  Diabetic    Frequency of exercise:  2-3 days/week    Duration of exercise:  Less than 15 minutes    Taking medications regularly:  Yes    Medication side effects:  None    Additional concerns today:  No      Today's PHQ-9 Score:       10/26/2023    10:42 AM   PHQ-9 SCORE   PHQ-9 Total Score MyChart 0   PHQ-9 Total Score 0     Social History     Tobacco Use    Smoking status: Every Day     Packs/day: .25     Types: Cigarettes     Start date: 1/1/1990    Smokeless tobacco: Never    Tobacco comments:     4 Cig/day   Substance Use Topics    Alcohol use: Not Currently     Comment: 1 liter Vodka / day since 20 yrs age intermittently ; Last drink 8/13/22             10/26/2023    10:45 AM   Alcohol Use   Prescreen: >3 drinks/day or >7 drinks/week? No          No data to display              Reviewed orders with patient.  Reviewed health maintenance and updated orders accordingly - Yes  Lab work is in process  Labs reviewed in EPIC    Breast Cancer Screening:        10/12/2022     9:20 AM 11/15/2022     2:32 PM 12/23/2022     9:07 AM   Breast CA Risk Assessment (FHS-7)   Do you have a family history of breast, colon, or ovarian cancer? Yes No / Unknown No / Unknown       click delete button to remove this line now  Mammogram Screening: Recommended annual mammography  Pertinent mammograms are reviewed under the imaging tab.    History of abnormal Pap smear: NO - age 30-65 PAP every 5 years with negative HPV co-testing recommended      Latest Ref Rng & Units 10/20/2022     3:19 PM   PAP / HPV   PAP  Negative for Intraepithelial Lesion or Malignancy (NILM)    HPV 16 DNA  Negative Negative    HPV 18 DNA Negative Negative    Other HR HPV Negative Positive      Reviewed and updated as needed this visit by clinical staff   Tobacco  Allergies  Meds              Reviewed and updated as needed this visit by Provider                 No past medical history on file.   No past surgical history on file.  OB History    Para Term  AB Living   1 0 0 0 1 0   SAB IAB Ectopic Multiple Live Births   0 1 0 0 0      # Outcome Date GA Lbr Luiz/2nd Weight Sex Delivery Anes PTL Lv   1 IAB                Review of Systems   Constitutional:  Negative for chills and fever.   HENT:  Positive for congestion and hearing loss. Negative for ear pain and sore throat.    Eyes:  Positive for visual disturbance. Negative for pain.   Respiratory:  Negative for cough and shortness of breath.    Cardiovascular:  Negative for chest pain, palpitations and peripheral edema.   Gastrointestinal:  Positive for abdominal pain and constipation. Negative for diarrhea, heartburn, hematochezia and nausea.   Genitourinary:  Negative for dysuria, frequency, genital sores, hematuria and urgency.   Musculoskeletal:  Negative for arthralgias, joint swelling and myalgias.   Skin:  Negative for rash.   Neurological:  Negative for dizziness, weakness, headaches and paresthesias.   Psychiatric/Behavioral:  Negative for mood changes. The patient is not nervous/anxious.      CONSTITUTIONAL: NEGATIVE for fever, chills, change in weight  INTEGUMENTARY/SKIN: NEGATIVE for worrisome rashes, moles or lesions  EYES: NEGATIVE for vision changes or irritation  ENT: NEGATIVE for ear, mouth and throat problems  RESP: NEGATIVE for significant cough or SOB  BREAST: NEGATIVE for masses, tenderness or discharge  CV: NEGATIVE for chest pain, palpitations or peripheral edema  GI: NEGATIVE for nausea, abdominal pain, heartburn, or change in bowel habits  : NEGATIVE for unusual urinary or vaginal symptoms. No vaginal  "bleeding.  MUSCULOSKELETAL: NEGATIVE for significant arthralgias or myalgia  NEURO: NEGATIVE for weakness, dizziness or paresthesias  PSYCHIATRIC: NEGATIVE for changes in mood or affect      OBJECTIVE:   BP (!) 86/63   Pulse 85   Temp 97.2  F (36.2  C) (Temporal)   Ht 1.651 m (5' 5\")   Wt 57.2 kg (126 lb)   SpO2 98%   BMI 20.97 kg/m    Physical Exam  GENERAL APPEARANCE: healthy, alert and no distress  EYES: Eyes grossly normal to inspection, PERRL and conjunctivae and sclerae normal  HENT: ear canals and TM's normal, nose and mouth without ulcers or lesions, oropharynx clear and oral mucous membranes moist  NECK: no adenopathy, no asymmetry, masses, or scars and thyroid normal to palpation  RESP: lungs clear to auscultation - no rales, rhonchi or wheezes  BREAST: Mammogram  CV: regular rate and rhythm, normal S1 S2, no S3 or S4, no murmur, click or rub, no peripheral edema and peripheral pulses strong  ABDOMEN: soft, nontender, no hepatosplenomegaly, no masses and bowel sounds normal  MS: no musculoskeletal defects are noted and gait is age appropriate without ataxia  SKIN: no suspicious lesions or rashes  NEURO: Normal strength and tone, sensory exam grossly normal, mentation intact and speech normal  PSYCH: mentation appears normal and affect normal/bright    Diagnostic Test Results:  Labs reviewed in Epic    ASSESSMENT/PLAN:       Assessment and Plan    1. Routine general medical examination at a health care facility    Last seen pt on 4/2023 for diabetes follow up which is well controlled On Metformin 1000 mg XR BID. She does have medical conditions of Constipation, Lumbar vertebral fracture with DD lumbar and intermittent flareups. On Zanaflex.   - Smoking Hx of 6-7 Cig/day on Nicoderm.   - Last labs in 2/2023 with normal A1C , CBC. Does have UTI at that time. Last CMP , Lipid paenl in 9/2022 with past elevated LFTs normalized.   - Last DEXA in 11/2022 showing - Severe osteoporosis on the basis of " multiple vertebral fractures.  Degenerative changes at the lumbar spine may falsely elevate results. And recommended Fosamax.     - Does follow Psychiatry , Addiction medicine for Alcohol , Mood disorder issues and medications as managed by them.     - Lipid panel reflex to direct LDL Fasting; Future  - Hemoglobin A1c; Future  - Albumin Random Urine Quantitative with Creat Ratio; Future  - COLOGUARD(EXACT SCIENCES); Future  - CBC with platelets; Future  - Comprehensive metabolic panel (BMP + Alb, Alk Phos, ALT, AST, Total. Bili, TP); Future  - Lipid panel reflex to direct LDL Fasting  - Hemoglobin A1c  - Albumin Random Urine Quantitative with Creat Ratio  - CBC with platelets  - Comprehensive metabolic panel (BMP + Alb, Alk Phos, ALT, AST, Total. Bili, TP)  - nicotine (NICODERM CQ) 14 MG/24HR 24 hr patch; Place 1 patch onto the skin every 24 hours  Dispense: 42 patch; Refill: 0  - alendronate (FOSAMAX) 70 MG tablet; Take 1 tablet (70 mg) by mouth every 7 days  Dispense: 4 tablet; Refill: 4  - Vitamin D deficiency screening; Future  - Vitamin D deficiency screening  - SMOKING CESSATION COUNSELING 3-10 MIN    2. RENETTA (acute kidney injury) (H24)  New problem, most likely dehydration versus Fosamax started for pt Osteoporosis . Will hold off on Fosamax and placed referral to endocrinology for further management of Osteoporosis.   - Adult Endocrinology  Referral; Future    2. Lower abdominal pain  Acute problem, with 8/10 severity and localized guarding on palpation . BS + , No rigidity. Does have Constipation for past 4 days as she states. Will check UA and Abdomen X ray as below and do further recommendations.    - UA with Microscopic reflex to Culture - lab collect; Future  - UA with Microscopic reflex to Culture - lab collect  - US Pelvic Complete with Transvaginal; Future    3. Abnormal cervical Papanicolaou smear, unspecified abnormal pap finding  Pt has been non compliant on this inspite of discussion  multiple times in the past . Colposcopy due which I dont find any records and pt not aware of the location which she saw in the past . Unsure on the possible complications causing the above concerns. Placed referral to OBGYN which pt understood and agreed with the plan.   - Ob/Gyn  Referral; Future    4. Constipation, unspecified constipation type  Ongoing problem, Uncontrolled. Will send Miralax, Plan Abdomen X ray and do further recommendations.   UPDATE - Reviewed abdomen X ray positive for Moderate to large amount of stool throughout the colon consistent with constipation.  - polyethylene glycol (MIRALAX) 17 GM/Dose powder; Take 17 g by mouth daily  Dispense: 500 g; Refill: 1  - CBC with platelets; Future  - XR Abdomen Upright Only; Future  - CBC with platelets    5. Closed fracture of lumbar vertebra with routine healing, unspecified fracture morphology, unspecified lumbar vertebral level, subsequent encounter  Chronic problem, Uncontrolled. Pt has seen Spine specialist in the past which she states do not want to go back to the same person again. Given the current Physical exam positive for severe paraspinal muscle spasm and difficulty ambulation though she denies any new trauma will consider Medrol dose miguel and muscle relaxor as shared decision with all side effects explained.   - Placed PT referral as well with Pain management to do further recommendations.   - Comprehensive metabolic panel (BMP + Alb, Alk Phos, ALT, AST, Total. Bili, TP); Future  - Comprehensive metabolic panel (BMP + Alb, Alk Phos, ALT, AST, Total. Bili, TP)  - Vitamin D deficiency screening; Future  - Vitamin D deficiency screening    6. Osteoporosis of vertebra  - alendronate (FOSAMAX) 70 MG tablet; Take 1 tablet (70 mg) by mouth every 7 days  Dispense: 4 tablet; Refill: 4    7. Acute bilateral low back pain, unspecified whether sciatica present  Acute problem of severe back pain as mentioned above , will give Medrol dosepak,  Muscle relaxor and physical therapy for improvement.   - methylPREDNISolone (MEDROL DOSEPAK) 4 MG tablet therapy pack; Follow Package Directions  Dispense: 21 tablet; Refill: 0  - Pain Management  Referral; Future  - tiZANidine (ZANAFLEX) 4 MG tablet; Take 1 tablet (4 mg) by mouth nightly as needed for muscle spasms  Dispense: 30 tablet; Refill: 1  - Physical Therapy Referral; Future    8. Alcohol dependence in remission (H)  - Comprehensive metabolic panel (BMP + Alb, Alk Phos, ALT, AST, Total. Bili, TP); Future  - Comprehensive metabolic panel (BMP + Alb, Alk Phos, ALT, AST, Total. Bili, TP)    9. Type 2 diabetes mellitus with diabetic polyneuropathy, without long-term current use of insulin (H)  - Lipid panel reflex to direct LDL Fasting; Future  - Hemoglobin A1c; Future  - Albumin Random Urine Quantitative with Creat Ratio; Future  - Lipid panel reflex to direct LDL Fasting  - Hemoglobin A1c  - Albumin Random Urine Quantitative with Creat Ratio    10. Type 2 diabetes mellitus without complication, without long-term current use of insulin (H)  - blood glucose monitoring (NO BRAND SPECIFIED) meter device kit; Use to test blood sugar 2 times daily or as directed.  Dispense: 1 kit; Refill: 0  - blood glucose monitoring (SOFTCLIX) lancets; Use to test blood sugar 3 times daily.  Dispense: 100 each; Refill: 4    11. Screen for colon cancer  - COLOGUARD(EXACT SCIENCES); Future    12. Need for prophylactic vaccination and inoculation against influenza  - INFLUENZA VACCINE IM > 6 MONTHS VALENT IIV4 (AFLURIA/FLUZONE)    13. High priority for 2019-nCoV vaccine  - COVID-19 12+ (2023-24) (PFIZER)    14. Nicotine dependence, uncomplicated, unspecified nicotine product type  Patient was counseled on smoking cessation, we discussed the benefits of quitting including .Also encouraged to set a stop date. This took three minutes duration.   - nicotine (NICODERM CQ) 14 MG/24HR 24 hr patch; Place 1 patch onto the skin every  24 hours  Dispense: 42 patch; Refill: 0  - SMOKING CESSATION COUNSELING 3-10 MIN        Over 40 minutes spent outside the preventive visit ( 20 minutes ) on reviewing patient chart,  face to face encounter, greater than 50% time spent with plan/cordination of care and documentation as above in my A/P.              Please note that this note consists of symbols derived from keyboarding, dictation and/or voice recognition software. As a result, there may be errors in the script that have gone undetected. Please consider this when interpreting information found in this chart.      Patient Instructions   As discussed , please do fasting labs placed.   Given your severe back pain will give Prednisone  Placed referral to pain management for your back pain. Also started on muscle relaxor. Referral to Physical therapy placed.      Will check urinalysis and also x-ray of the abdomen before considering the next steps on your abdominal pain.    Placed referral to gynecology for colposcopy and further recommendations on the abnormal Pap smear in the past which I do not find any records though you state that you have seen someone around 4 years back.    ===================    Preventive Health Recommendations  Female Ages 40 to 49    Yearly exam:   See your health care provider every year in order to  Review health changes.   Discuss preventive care.    Review your medicines if your doctor prescribed any.    Get a Pap test every three years (unless you have an abnormal result and your provider advises testing more often).    If you get Pap tests with HPV test, you only need to test every 5 years, unless you have an abnormal result. You do not need a Pap test if your uterus was removed (hysterectomy) and you have not had cancer.    You should be tested each year for STDs (sexually transmitted diseases), if you're at risk.   Ask your doctor if you should have a mammogram.    Have a colonoscopy (test for colon cancer) beginning at  age 45.  Ask your provider about other options like a yearly FIT test or Cologuard test every 3 years (stool tests)      Have a cholesterol test every 5 years.     Have a diabetes test (fasting glucose) after age 45. If you are at risk for diabetes, you should have this test every 3 years.    Shots: Get a flu shot each year. Get a tetanus shot every 10 years.     Nutrition:   Eat at least 5 servings of fruits and vegetables each day.  Eat whole-grain bread, whole-wheat pasta and brown rice instead of white grains and rice.  Get adequate Calcium and Vitamin D.      Lifestyle  Exercise at least 150 minutes a week (an average of 30 minutes a day, 5 days a week). This will help you control your weight and prevent disease.  Limit alcohol to one drink per day.  No smoking.   Wear sunscreen to prevent skin cancer.  See your dentist every six months for an exam and cleaning.  Preventive Health Recommendations  Female Ages 40 to 49    Yearly exam:   See your health care provider every year in order to  Review health changes.   Discuss preventive care.    Review your medicines if your doctor prescribed any.    Get a Pap test every three years (unless you have an abnormal result and your provider advises testing more often).    If you get Pap tests with HPV test, you only need to test every 5 years, unless you have an abnormal result. You do not need a Pap test if your uterus was removed (hysterectomy) and you have not had cancer.    You should be tested each year for STDs (sexually transmitted diseases), if you're at risk.   Ask your doctor if you should have a mammogram.    Have a colonoscopy (test for colon cancer) beginning at age 45.  Ask your provider about other options like a yearly FIT test or Cologuard test every 3 years (stool tests)      Have a cholesterol test every 5 years.     Have a diabetes test (fasting glucose) after age 45. If you are at risk for diabetes, you should have this test every 3 years.    Shots:  Get a flu shot each year. Get a tetanus shot every 10 years.     Nutrition:   Eat at least 5 servings of fruits and vegetables each day.  Eat whole-grain bread, whole-wheat pasta and brown rice instead of white grains and rice.  Get adequate Calcium and Vitamin D.      Lifestyle  Exercise at least 150 minutes a week (an average of 30 minutes a day, 5 days a week). This will help you control your weight and prevent disease.  Limit alcohol to one drink per day.  No smoking.   Wear sunscreen to prevent skin cancer.  See your dentist every six months for an exam and cleaning.  Return in about 3 months (around 1/26/2024), or if symptoms worsen or fail to improve, for If symptoms persist, Follow up of last visit.    Laura Vale MD  [facility]      Patient has been advised of split billing requirements and indicates understanding: Yes      COUNSELING:  Reviewed preventive health counseling, as reflected in patient instructions  Special attention given to:        Healthy diet/nutrition       Vision screening       Hearing screening       Immunizations  Vaccinated for: Covid-19 and Influenza           Osteoporosis prevention/bone health       (Sara)menopause management        She reports that she has been smoking cigarettes. She started smoking about 33 years ago. She has been smoking an average of .25 packs per day. She has never used smokeless tobacco.  Nicotine/Tobacco Cessation Plan:   Information offered: Patient not interested at this time            Laura Vale MD  [facility]  Answers submitted by the patient for this visit:  Patient Health Questionnaire (Submitted on 10/26/2023)  If you checked off any problems, how difficult have these problems made it for you to do your work, take care of things at home, or get along with other people?: Not difficult at all  PHQ9 TOTAL SCORE: 0

## 2023-10-26 NOTE — PATIENT INSTRUCTIONS
As discussed , please do fasting labs placed.   Given your severe back pain will give Prednisone  Placed referral to pain management for your back pain. Also started on muscle relaxor. Referral to Physical therapy placed.      Will check urinalysis and also x-ray of the abdomen before considering the next steps on your abdominal pain.    Placed referral to gynecology for colposcopy and further recommendations on the abnormal Pap smear in the past which I do not find any records though you state that you have seen someone around 4 years back.    ===================    Preventive Health Recommendations  Female Ages 40 to 49    Yearly exam:   See your health care provider every year in order to  Review health changes.   Discuss preventive care.    Review your medicines if your doctor prescribed any.    Get a Pap test every three years (unless you have an abnormal result and your provider advises testing more often).    If you get Pap tests with HPV test, you only need to test every 5 years, unless you have an abnormal result. You do not need a Pap test if your uterus was removed (hysterectomy) and you have not had cancer.    You should be tested each year for STDs (sexually transmitted diseases), if you're at risk.   Ask your doctor if you should have a mammogram.    Have a colonoscopy (test for colon cancer) beginning at age 45.  Ask your provider about other options like a yearly FIT test or Cologuard test every 3 years (stool tests)      Have a cholesterol test every 5 years.     Have a diabetes test (fasting glucose) after age 45. If you are at risk for diabetes, you should have this test every 3 years.    Shots: Get a flu shot each year. Get a tetanus shot every 10 years.     Nutrition:   Eat at least 5 servings of fruits and vegetables each day.  Eat whole-grain bread, whole-wheat pasta and brown rice instead of white grains and rice.  Get adequate Calcium and Vitamin D.      Lifestyle  Exercise at least 150  minutes a week (an average of 30 minutes a day, 5 days a week). This will help you control your weight and prevent disease.  Limit alcohol to one drink per day.  No smoking.   Wear sunscreen to prevent skin cancer.  See your dentist every six months for an exam and cleaning.  Preventive Health Recommendations  Female Ages 40 to 49    Yearly exam:   See your health care provider every year in order to  Review health changes.   Discuss preventive care.    Review your medicines if your doctor prescribed any.    Get a Pap test every three years (unless you have an abnormal result and your provider advises testing more often).    If you get Pap tests with HPV test, you only need to test every 5 years, unless you have an abnormal result. You do not need a Pap test if your uterus was removed (hysterectomy) and you have not had cancer.    You should be tested each year for STDs (sexually transmitted diseases), if you're at risk.   Ask your doctor if you should have a mammogram.    Have a colonoscopy (test for colon cancer) beginning at age 45.  Ask your provider about other options like a yearly FIT test or Cologuard test every 3 years (stool tests)      Have a cholesterol test every 5 years.     Have a diabetes test (fasting glucose) after age 45. If you are at risk for diabetes, you should have this test every 3 years.    Shots: Get a flu shot each year. Get a tetanus shot every 10 years.     Nutrition:   Eat at least 5 servings of fruits and vegetables each day.  Eat whole-grain bread, whole-wheat pasta and brown rice instead of white grains and rice.  Get adequate Calcium and Vitamin D.      Lifestyle  Exercise at least 150 minutes a week (an average of 30 minutes a day, 5 days a week). This will help you control your weight and prevent disease.  Limit alcohol to one drink per day.  No smoking.   Wear sunscreen to prevent skin cancer.  See your dentist every six months for an exam and cleaning.

## 2023-10-26 NOTE — COMMUNITY RESOURCES LIST (ENGLISH)
10/26/2023   [facility] reQRdos  N/A  For questions about this resource list or additional care needs, please contact your primary care clinic or care manager.  Phone: [phone]   Email: N/A   Address: [address] [place] [id number]   Hours: N/A        Financial Stability       Rent and mortgage payment assistance  1  [facility] Community Development Agency (CDA) Distance: 2.76 miles      In-Person   [address] [place] [address]  Language: English  Hours: Mon - Fri 9:00 AM - 4:00 PM  Fees: Free   Phone: (778) 314-9232 Email: info@[email or web address].[email or web address]. Website: http://[email or web address].org/     2  [address] [place] [address] Distance: 3.8 miles      In-Person, Phone/Virtual   [address] [facility] [address] [facility] [address] [place] [address]  Language: English, Turkish  Hours: Mon - Thu 9:00 AM - 4:00 PM  Fees: Free   Phone: (281) 484-1104 Email: info@[facility].org Website: [email or web address]/          Important Numbers & Websites       Emergency Services   911  [facility]   311  Poison Control   (538) 926-5842  Suicide Prevention Lifeline   (819) 913-4731 (TALK)  Child Abuse Hotline   (253) 814-4693 (4-A-Child)  Sexual Assault Hotline   (849) 563-5552 (HOPE)  National Runaway Safeline   (474) 593-2981 (RUNAWAY)  All-Options Talkline   (423) 751-5922  Substance Abuse Referral   (971) 703-7912 (HELP)

## 2023-10-27 LAB
ALBUMIN SERPL BCG-MCNC: 4.7 G/DL (ref 3.5–5.2)
ALP SERPL-CCNC: 39 U/L (ref 35–104)
ALT SERPL W P-5'-P-CCNC: 9 U/L (ref 0–50)
ANION GAP SERPL CALCULATED.3IONS-SCNC: 11 MMOL/L (ref 7–15)
AST SERPL W P-5'-P-CCNC: 17 U/L (ref 0–45)
BILIRUB SERPL-MCNC: 0.3 MG/DL
BUN SERPL-MCNC: 15.7 MG/DL (ref 6–20)
CALCIUM SERPL-MCNC: 9.2 MG/DL (ref 8.6–10)
CHLORIDE SERPL-SCNC: 108 MMOL/L (ref 98–107)
CHOLEST SERPL-MCNC: 185 MG/DL
CREAT SERPL-MCNC: 1.02 MG/DL (ref 0.51–0.95)
CREAT UR-MCNC: 175 MG/DL
DEPRECATED HCO3 PLAS-SCNC: 23 MMOL/L (ref 22–29)
EGFRCR SERPLBLD CKD-EPI 2021: 68 ML/MIN/1.73M2
GLUCOSE SERPL-MCNC: 80 MG/DL (ref 70–99)
HDLC SERPL-MCNC: 82 MG/DL
LDLC SERPL CALC-MCNC: 85 MG/DL
MICROALBUMIN UR-MCNC: <12 MG/L
MICROALBUMIN/CREAT UR: NORMAL MG/G{CREAT}
NONHDLC SERPL-MCNC: 103 MG/DL
POTASSIUM SERPL-SCNC: 3.9 MMOL/L (ref 3.4–5.3)
PROT SERPL-MCNC: 6.7 G/DL (ref 6.4–8.3)
SODIUM SERPL-SCNC: 142 MMOL/L (ref 135–145)
TRIGL SERPL-MCNC: 89 MG/DL
VIT D+METAB SERPL-MCNC: 94 NG/ML (ref 20–50)

## 2023-11-07 DIAGNOSIS — E11.9 TYPE 2 DIABETES MELLITUS WITHOUT COMPLICATION, WITHOUT LONG-TERM CURRENT USE OF INSULIN (H): ICD-10-CM

## 2023-11-07 NOTE — TELEPHONE ENCOUNTER
Prescription approved per [facility] Refill Protocol.  Nuvia Daniels, RN  [facility] Triage Nurse

## 2023-11-10 ENCOUNTER — ANCILLARY PROCEDURE (OUTPATIENT)
Dept: ULTRASOUND IMAGING | Facility: CLINIC | Age: 48
End: 2023-11-10
Attending: INTERNAL MEDICINE
Payer: COMMERCIAL

## 2023-11-10 DIAGNOSIS — R10.30 LOWER ABDOMINAL PAIN: ICD-10-CM

## 2023-11-10 PROCEDURE — 76856 US EXAM PELVIC COMPLETE: CPT

## 2023-11-13 ENCOUNTER — PATIENT OUTREACH (OUTPATIENT)
Dept: CARE COORDINATION | Facility: CLINIC | Age: 48
End: 2023-11-13
Payer: COMMERCIAL

## 2023-11-27 ENCOUNTER — MYC MEDICAL ADVICE (OUTPATIENT)
Dept: FAMILY MEDICINE | Facility: CLINIC | Age: 48
End: 2023-11-27
Payer: COMMERCIAL

## 2023-11-27 DIAGNOSIS — N17.9 AKI (ACUTE KIDNEY INJURY) (H): Primary | ICD-10-CM

## 2023-12-04 NOTE — TELEPHONE ENCOUNTER
Please see [employer] message and advise.     Is it okay for patient to wait until May for that appointment? Or should patient be seen sooner?    Donna MACKENZIE RN  [facility] Triage Team

## 2023-12-08 ENCOUNTER — THERAPY VISIT (OUTPATIENT)
Dept: PHYSICAL THERAPY | Facility: CLINIC | Age: 48
End: 2023-12-08
Attending: INTERNAL MEDICINE
Payer: COMMERCIAL

## 2023-12-08 DIAGNOSIS — M54.50 ACUTE BILATERAL LOW BACK PAIN WITHOUT SCIATICA: Primary | ICD-10-CM

## 2023-12-08 DIAGNOSIS — M54.50 ACUTE BILATERAL LOW BACK PAIN, UNSPECIFIED WHETHER SCIATICA PRESENT: ICD-10-CM

## 2023-12-08 DIAGNOSIS — S32.009D CLOSED FRACTURE OF LUMBAR VERTEBRA WITH ROUTINE HEALING, UNSPECIFIED FRACTURE MORPHOLOGY, UNSPECIFIED LUMBAR VERTEBRAL LEVEL, SUBSEQUENT ENCOUNTER: ICD-10-CM

## 2023-12-08 DIAGNOSIS — E11.9 TYPE 2 DIABETES MELLITUS WITHOUT COMPLICATION, WITHOUT LONG-TERM CURRENT USE OF INSULIN (H): ICD-10-CM

## 2023-12-08 PROCEDURE — 97110 THERAPEUTIC EXERCISES: CPT | Mod: GP | Performed by: PHYSICAL THERAPIST

## 2023-12-08 PROCEDURE — 97161 PT EVAL LOW COMPLEX 20 MIN: CPT | Mod: GP | Performed by: PHYSICAL THERAPIST

## 2023-12-08 RX ORDER — METFORMIN HCL 500 MG
TABLET, EXTENDED RELEASE 24 HR ORAL
Qty: 120 TABLET | Refills: 0 | Status: SHIPPED | OUTPATIENT
Start: 2023-12-08 | End: 2024-01-09

## 2023-12-08 RX ORDER — CHOLECALCIFEROL (VITAMIN D3) 50 MCG
TABLET ORAL
Qty: 30 TABLET | Refills: 0 | Status: SHIPPED | OUTPATIENT
Start: 2023-12-08 | End: 2024-01-09

## 2023-12-08 NOTE — PROGRESS NOTES
PHYSICAL THERAPY EVALUATION  Type of Visit: Evaluation    See electronic medical record for Abuse and Falls Screening details.    Subjective       Presenting condition or subjective complaint: lower back pain, present for many years.  Date of onset: 10/26/23 (MD referral date)    Saw physician 10/26/2023 with order for physical therapy; also order for referral to spine specialist, but reports has not scheduled this yet.   Relevant medical history: Depression; Diabetes; Dizziness; Hearing problems; Menopause; Mental Illness; Osteoarthritis; Osteoporosis; Severe headaches; Smoking; Substance use disorder; Vision problems   Dates & types of surgery:      Prior diagnostic imaging/testing results: MRI; X-ray     Prior therapy history for the same diagnosis, illness or injury: No        Living Environment  Social support: Alone   Type of home: Apartment/condo   Stairs to enter the home: Yes       Ramp: No   Stairs inside the home: No       Help at home: None  Equipment owned:       Employment: Yes driving, prolonged sitting  Hobbies/Interests: movies, crafts, decor, embroidery    Patient goals for therapy: walk longer distances, stand for longer distances longer periods of time    Pain assessment: Pain present  See objective evaluation for additional pain details     Objective   LUMBAR SPINE EVALUATION  PAIN: Pain Level at Rest: 2/10  Pain Level with Use: 7/10  Pain Location: bilateral low back, cental spine  Pain Frequency: constant  Pain is Exacerbated By: prolonged sitting, standing,   Pain is Relieved By: rest    POSTURE: Sitting Posture: Rounded shoulders, Forward head       ROM:   (Degrees) Left AROM Left PROM  Right AROM Right PROM   Hip Flexion WNL  WNL    Hip Extension       Hip Abduction WNL  WNL    Hip Adduction       Hip Internal Rotation WNL  WNL    Hip External Rotation WNL  WNL    Knee Flexion       Knee Extension       Lumbar Side glide 50% with pain 75% with end range pain   Lumbar Flexion Finger tips to  knee level with increased pain low back   Lumbar Extension 50% with increased pain   Pain:  pain with all AROM of lumbar spine, all positions including supine and sitting. Reports unable to go prone due to pain    STRENGTH:  right hip abduction, extension 4/5; left  4+/5; core stabilizers 4/4    MYOTOMES:    Left Right   T12-L3 (Hip Flexion) 4+ 4+   L2-4 (Quads)  5- 4+   L4 (Ankle DF) 5 5   L5 (Great Toe Ext)     S1 (Toe Raise) 5 5       NEURAL TENSION:    Left Right   SLR Negative  Negative    SLR with DF Negative  Negative    Femoral Nerve     Slump Negative  Negative    Ghanshyam (Lumbar)     Ghanshyam (Thoracic)     Ghanshyam (Cervical)     Median     Ulnar     Radial          PALPATION:  tenderness mild palpation lumbar paraspinals bilaterally    Assessment & Plan   CLINICAL IMPRESSIONS  Medical Diagnosis: acute bilateral low back pain unspecified whether sciatica present    Treatment Diagnosis: acute bilateral low back pain without sciatica   Impression/Assessment: Patient is a 48 year old female with low back pain complaints.  The following significant findings have been identified: Pain, Decreased ROM/flexibility, Decreased joint mobility, Decreased strength, Impaired muscle performance, and Decreased activity tolerance. These impairments interfere with their ability to perform self care tasks, work tasks, recreational activities, household chores, driving , household mobility, and community mobility as compared to previous level of function.     Clinical Decision Making (Complexity):  Clinical Presentation: Stable/Uncomplicated  Clinical Presentation Rationale: based on medical and personal factors listed in PT evaluation  Clinical Decision Making (Complexity): Low complexity    PLAN OF CARE  Treatment Interventions:  Interventions: Manual Therapy, Neuromuscular Re-education, Therapeutic Activity, Therapeutic Exercise, Self-Care/Home Management    Long Term Goals     PT Goal 1  Goal Identifier: Standing  Goal Description:  standing for 60 minutes for daily and work activities pain 1/10  Rationale: to maximize safety and independence with performance of ADLs and functional tasks;to maximize safety and independence within the home;to maximize safety and independence within the community;to maximize safety and independence with transportation;to maximize safety and independence with self cares  Target Date: 02/02/24  PT Goal 2  Goal Identifier: Sitting  Goal Description: Sit for 60 minutes pain 1/10 for daily and work activities, including driving  Rationale: to maximize safety and independence with performance of ADLs and functional tasks;to maximize safety and independence within the home;to maximize safety and independence within the community;to maximize safety and independence with transportation;to maximize safety and independence with self cares  Target Date: 02/02/24      Frequency of Treatment: 1x/week  Duration of Treatment: 8 weeks      Education Assessment:   Learner/Method: Patient;Pictures/Video;No Barriers to Learning  Education Comments: Educated pt on findings of the evaluation and reasoning for plan of care.  Pt was able to understand how treatment plan aligns with goals.    Risks and benefits of evaluation/treatment have been explained.   Patient/Family/caregiver agrees with Plan of Care.     Evaluation Time:     PT Eval, Low Complexity Minutes (37407): 17       Signing Clinician: Sudha Vazquez, PT

## 2023-12-18 DIAGNOSIS — E11.9 TYPE 2 DIABETES MELLITUS WITHOUT COMPLICATION, WITHOUT LONG-TERM CURRENT USE OF INSULIN (H): ICD-10-CM

## 2023-12-18 RX ORDER — METFORMIN HCL 500 MG
TABLET, EXTENDED RELEASE 24 HR ORAL
Qty: 120 TABLET | Refills: 0 | OUTPATIENT
Start: 2023-12-18

## 2024-01-02 LAB — NONINV COLON CA DNA+OCC BLD SCRN STL QL: NEGATIVE

## 2024-01-03 NOTE — RESULT ENCOUNTER NOTE
Your COLOGUARD test is negative, recommend repeat in 3 years.  Please let me know if you have any questions.    Thank you,  Laura Vale MD on 1/3/2024   [facility]

## 2024-01-05 ENCOUNTER — THERAPY VISIT (OUTPATIENT)
Dept: PHYSICAL THERAPY | Facility: CLINIC | Age: 49
End: 2024-01-05
Payer: COMMERCIAL

## 2024-01-05 DIAGNOSIS — M54.50 ACUTE BILATERAL LOW BACK PAIN WITHOUT SCIATICA: ICD-10-CM

## 2024-01-05 DIAGNOSIS — M54.50 ACUTE BILATERAL LOW BACK PAIN, UNSPECIFIED WHETHER SCIATICA PRESENT: Primary | ICD-10-CM

## 2024-01-05 PROCEDURE — 97110 THERAPEUTIC EXERCISES: CPT | Mod: GP | Performed by: PHYSICAL THERAPIST

## 2024-01-08 DIAGNOSIS — E11.9 TYPE 2 DIABETES MELLITUS WITHOUT COMPLICATION, WITHOUT LONG-TERM CURRENT USE OF INSULIN (H): ICD-10-CM

## 2024-01-08 DIAGNOSIS — K59.00 CONSTIPATION, UNSPECIFIED CONSTIPATION TYPE: ICD-10-CM

## 2024-01-09 DIAGNOSIS — S32.009D CLOSED FRACTURE OF LUMBAR VERTEBRA WITH ROUTINE HEALING, UNSPECIFIED FRACTURE MORPHOLOGY, UNSPECIFIED LUMBAR VERTEBRAL LEVEL, SUBSEQUENT ENCOUNTER: ICD-10-CM

## 2024-01-09 RX ORDER — CHOLECALCIFEROL (VITAMIN D3) 50 MCG
1 TABLET ORAL DAILY
Qty: 90 TABLET | Refills: 1 | Status: SHIPPED | OUTPATIENT
Start: 2024-01-09

## 2024-01-09 RX ORDER — METFORMIN HCL 500 MG
TABLET, EXTENDED RELEASE 24 HR ORAL
Qty: 360 TABLET | Refills: 0 | Status: SHIPPED | OUTPATIENT
Start: 2024-01-09 | End: 2024-03-05

## 2024-01-09 RX ORDER — POLYETHYLENE GLYCOL 3350 17 G/17G
POWDER ORAL
Qty: 510 G | Refills: 0 | Status: SHIPPED | OUTPATIENT
Start: 2024-01-09 | End: 2024-02-08

## 2024-01-19 ENCOUNTER — THERAPY VISIT (OUTPATIENT)
Dept: PHYSICAL THERAPY | Facility: CLINIC | Age: 49
End: 2024-01-19
Payer: COMMERCIAL

## 2024-01-19 DIAGNOSIS — M54.50 ACUTE BILATERAL LOW BACK PAIN WITHOUT SCIATICA: ICD-10-CM

## 2024-01-19 DIAGNOSIS — M54.50 ACUTE BILATERAL LOW BACK PAIN, UNSPECIFIED WHETHER SCIATICA PRESENT: Primary | ICD-10-CM

## 2024-01-19 PROCEDURE — 97110 THERAPEUTIC EXERCISES: CPT | Mod: GP | Performed by: PHYSICAL THERAPIST

## 2024-02-02 ENCOUNTER — THERAPY VISIT (OUTPATIENT)
Dept: PHYSICAL THERAPY | Facility: CLINIC | Age: 49
End: 2024-02-02
Payer: COMMERCIAL

## 2024-02-02 DIAGNOSIS — M54.50 ACUTE BILATERAL LOW BACK PAIN WITHOUT SCIATICA: ICD-10-CM

## 2024-02-02 DIAGNOSIS — M54.50 ACUTE BILATERAL LOW BACK PAIN, UNSPECIFIED WHETHER SCIATICA PRESENT: Primary | ICD-10-CM

## 2024-02-02 PROCEDURE — 97110 THERAPEUTIC EXERCISES: CPT | Mod: GP | Performed by: PHYSICAL THERAPIST

## 2024-02-08 DIAGNOSIS — K59.00 CONSTIPATION, UNSPECIFIED CONSTIPATION TYPE: ICD-10-CM

## 2024-02-08 RX ORDER — POLYETHYLENE GLYCOL 3350 17 G/17G
POWDER, FOR SOLUTION ORAL
Qty: 510 G | Refills: 7 | Status: SHIPPED | OUTPATIENT
Start: 2024-02-08

## 2024-02-08 NOTE — TELEPHONE ENCOUNTER
Prescription approved per [facility] Refill Protocol.  Nuvia Daniels, RN  [facility] Triage Nurse

## 2024-02-17 ENCOUNTER — MYC MEDICAL ADVICE (OUTPATIENT)
Dept: FAMILY MEDICINE | Facility: CLINIC | Age: 49
End: 2024-02-17
Payer: COMMERCIAL

## 2024-02-20 NOTE — TELEPHONE ENCOUNTER
Could be needing Epidurals for back pain.     Please ask pt to contact her Neurosurgery which she already seen them. Assist in giving her the contact phonenumbers.     Thank you.  Laura Vale MD on 2/19/2024     Sue Amador PA-C  Neurological Surgery

## 2024-03-05 DIAGNOSIS — E11.9 TYPE 2 DIABETES MELLITUS WITHOUT COMPLICATION, WITHOUT LONG-TERM CURRENT USE OF INSULIN (H): ICD-10-CM

## 2024-03-05 RX ORDER — METFORMIN HCL 500 MG
TABLET, EXTENDED RELEASE 24 HR ORAL
Qty: 120 TABLET | Refills: 0 | Status: SHIPPED | OUTPATIENT
Start: 2024-03-05 | End: 2024-04-02

## 2024-03-08 PROBLEM — M54.50 ACUTE BILATERAL LOW BACK PAIN, UNSPECIFIED WHETHER SCIATICA PRESENT: Status: RESOLVED | Noted: 2023-12-08 | Resolved: 2024-03-08

## 2024-03-08 PROBLEM — M54.50 ACUTE BILATERAL LOW BACK PAIN WITHOUT SCIATICA: Status: RESOLVED | Noted: 2023-12-08 | Resolved: 2024-03-08

## 2024-03-08 NOTE — PROGRESS NOTES
02/02/24 0500   Appointment Info   Signing clinician's name / credentials Sudha Vazquez PT   Total/Authorized Visits E &T 8   Visits Used 4   Medical Diagnosis acute bilateral low back pain unspecified whether sciatica present   PT Tx Diagnosis acute bilateral low back pain without sciatica   Other pertinent information pt has severe osteoporosis; h/o mulitple vertebral fractures; degenerative changes in lumbar spine   Progress Note/Certification   Onset of illness/injury or Date of Surgery 10/26/23  (MD referral date)   Therapy Frequency 1x/week   Predicted Duration 8 weeks   Progress Note Due Date 02/02/24   Progress Note Completed Date 12/08/23   GOALS   PT Goals 2   PT Goal 1   Goal Identifier Standing   Goal Description standing for 60 minutes for daily and work activities pain 1/10   Rationale to maximize safety and independence with performance of ADLs and functional tasks;to maximize safety and independence within the home;to maximize safety and independence within the community;to maximize safety and independence with transportation;to maximize safety and independence with self cares   Goal Progress improved, still pain with prolonged standing, but feels like can stand longer before symptoms increase   Target Date 02/02/24   PT Goal 2   Goal Identifier Sitting   Goal Description Sit for 60 minutes pain 1/10 for daily and work activities, including driving   Rationale to maximize safety and independence with performance of ADLs and functional tasks;to maximize safety and independence within the home;to maximize safety and independence within the community;to maximize safety and independence with transportation;to maximize safety and independence with self cares   Goal Progress improving overall   Target Date 02/02/24   Subjective Report   Subjective Report States was having some increased low back pain, but not sure if from exercise or other activities, so held on a few exercises. Does feel like standing  and walking are getting better, feels like can stand and walk a bit longer   Treatment Interventions (PT)   Interventions Therapeutic Procedure/Exercise   Therapeutic Procedure/Exercise   Therapeutic Procedures: strength, endurance, ROM, flexibility minutes (62779) 30   Ther Proc 1 HEP   Ther Proc 1 - Details reveiwed and updated HEP, discussed can do every other day if any increased pain.   PTRx Ther Proc 1 Supine Lumbar Hip Roll   PTRx Ther Proc 1 - Details x 5 reps left and right staying in pain free range   PTRx Ther Proc 2 Single Knee to Chest   PTRx Ther Proc 2 - Details x 3 reps right and left; some end range tightness left hip cueing to not force into pain   PTRx Ther Proc 3 Supine Abdominal Exercise #2 (Heelslide)   PTRx Ther Proc 3 - Details x 10 right and left cueing for slow and controlled motion   PTRx Ther Proc 4 Supine Abdominal Exercise #3 (Marching)   PTRx Ther Proc 4 - Details Held this for now some pain with HEP   PTRx Ther Proc 5 Hip Flexion Straight Leg Raise   PTRx Ther Proc 5 - Details x10 right and left x 2 with fatigue at end of reps but improved form and strength    PTRx Ther Proc 6 Clamshell with Theraband   PTRx Ther Proc 6 - Details yellow x 10  doing about 15 reps with HEP can work on increasing 10-15 reps x 2 as able   PTRx Ther Proc 7 Prone Lumbar Extension Exercise #3 (Leg Extension)   PTRx Ther Proc 7 - Details x 10 alternating right and left.  cueing initially for just lifting leg off mat/floor so not getting rotation in back   PTRx Ther Proc 8 Prone Lumbar Exercise #4 (Alternate Arm/Leg Extension)   PTRx Ther Proc 8 - Details HELD from HEP as some pain when adding arms in   PTRx Ther Proc 9 Roll Ins   PTRx Ther Proc 9 - Details x 10   Skilled Intervention Verbal cues and demonstration used for proper performance of exercise including cues for proper form to decrease substitutions and isolate muscle being targeted; cues for slow and controlled motion both concentrically and  eccentrically; cueing for good starting position and posture, and to ensure safe performance of motion   Patient Response/Progress improved form and strength with exercises today as compared to last visit   Neuromuscular Re-education   PTRx Neuro Re-ed 1 Abdominal Brace Transverse Abdominis   PTRx Neuro Re-ed 1 - Details x 10 good form   Education   Learner/Method Patient;Pictures/Video;No Barriers to Learning   Education Comments prefers handouts for HEP   Plan   Home program see PTRX   Updates to plan of care issued updated handouts for HEP   Plan for next session progress with core stabilization including lumbar, abdominal and hip strengthening as able; focus on spinal neutral positions due to history of osteoporosis and lumbar fracture   Total Session Time   Timed Code Treatment Minutes 30   Total Treatment Time (sum of timed and untimed services) 30         DISCHARGE  Reason for Discharge: Patient chooses to discontinue therapy.  Patient has failed to schedule further appointments.    Equipment Issued: none    Discharge Plan: Patient to continue home program.    Referring Provider:  Laura Vale

## 2024-04-02 DIAGNOSIS — E11.9 TYPE 2 DIABETES MELLITUS WITHOUT COMPLICATION, WITHOUT LONG-TERM CURRENT USE OF INSULIN (H): ICD-10-CM

## 2024-04-02 RX ORDER — METFORMIN HCL 500 MG
TABLET, EXTENDED RELEASE 24 HR ORAL
Qty: 120 TABLET | Refills: 0 | Status: SHIPPED | OUTPATIENT
Start: 2024-04-02 | End: 2024-05-07

## 2024-05-02 ENCOUNTER — OFFICE VISIT (OUTPATIENT)
Dept: ENDOCRINOLOGY | Facility: CLINIC | Age: 49
End: 2024-05-02
Attending: INTERNAL MEDICINE
Payer: COMMERCIAL

## 2024-05-02 ENCOUNTER — ANCILLARY PROCEDURE (OUTPATIENT)
Dept: GENERAL RADIOLOGY | Facility: CLINIC | Age: 49
End: 2024-05-02
Attending: INTERNAL MEDICINE
Payer: COMMERCIAL

## 2024-05-02 VITALS
SYSTOLIC BLOOD PRESSURE: 101 MMHG | BODY MASS INDEX: 22.24 KG/M2 | HEART RATE: 86 BPM | TEMPERATURE: 97.8 F | OXYGEN SATURATION: 100 % | DIASTOLIC BLOOD PRESSURE: 70 MMHG | RESPIRATION RATE: 16 BRPM | WEIGHT: 133.5 LBS | HEIGHT: 65 IN

## 2024-05-02 DIAGNOSIS — M81.0 OSTEOPOROSIS OF VERTEBRA: ICD-10-CM

## 2024-05-02 DIAGNOSIS — S32.009D CLOSED FRACTURE OF LUMBAR VERTEBRA WITH ROUTINE HEALING, UNSPECIFIED FRACTURE MORPHOLOGY, UNSPECIFIED LUMBAR VERTEBRAL LEVEL, SUBSEQUENT ENCOUNTER: ICD-10-CM

## 2024-05-02 DIAGNOSIS — N17.9 AKI (ACUTE KIDNEY INJURY) (H): ICD-10-CM

## 2024-05-02 PROCEDURE — 36415 COLL VENOUS BLD VENIPUNCTURE: CPT | Performed by: INTERNAL MEDICINE

## 2024-05-02 PROCEDURE — 83970 ASSAY OF PARATHORMONE: CPT | Performed by: INTERNAL MEDICINE

## 2024-05-02 PROCEDURE — 72100 X-RAY EXAM L-S SPINE 2/3 VWS: CPT | Mod: TC | Performed by: INTERNAL MEDICINE

## 2024-05-02 PROCEDURE — 99204 OFFICE O/P NEW MOD 45 MIN: CPT | Performed by: INTERNAL MEDICINE

## 2024-05-02 PROCEDURE — 82565 ASSAY OF CREATININE: CPT | Performed by: INTERNAL MEDICINE

## 2024-05-02 PROCEDURE — 82306 VITAMIN D 25 HYDROXY: CPT | Performed by: INTERNAL MEDICINE

## 2024-05-02 PROCEDURE — G2211 COMPLEX E/M VISIT ADD ON: HCPCS | Performed by: INTERNAL MEDICINE

## 2024-05-02 PROCEDURE — 82310 ASSAY OF CALCIUM: CPT | Performed by: INTERNAL MEDICINE

## 2024-05-02 RX ORDER — TRAZODONE HYDROCHLORIDE 100 MG/1
TABLET ORAL
COMMUNITY
Start: 2024-04-17

## 2024-05-02 NOTE — PROGRESS NOTES
Name: Mariya Núñez  Date: 5/2/2024  Seen in consultation with Clinic, [facility] for osteoporosis.     HPI:  Mariya Núñez is a 48 year old female who presents for the evaluation of osteoporosis.   has no past medical history on file.    DEXA 11/2022: Severe osteoporosis on the basis of multiple vertebral fractures. (Based on CT scan)    RISK FACTORS:  Early menopause, age <45, Current tobacco use, Fracture of elbow, multiple nontraumatic vertebral fractures   CT 9/2022 showing--Subacute fractures involving the L3, L4 and L5 vertebral bodies.   But MRI 10/2022--No compression fractures.   She does not recommend any major trauma. She reports that she was alcoholic and does not recall any major trauma.  She is sober for 2 years now.    She was started on fosamax-- but after that noted RENETTA and was stopped .  It was unclear if RENETTA was secondary to dehydration or Fosamax but it was decided not to start Fosamax after that.  She took it for 6 months.    She was on depakot for 10 years as mood stabilizer. ( In her 20s. Now off for several years)    GERD: no    Dental health: OK. No major upcoming dental procedure.  Has regular follow-up with dentistry. May need a bridge.    Kidney function: History of RENETTA.  Last creatinine check 10/2022 showing slightly high creatinine.    Previous treatment for osteopenia/osteoporosis: Fosamax    Current treatment for Osteopenia/Osteoporosis: none    Smoke:Yes: current smoker  Family History:Yes: aunt with osteoporosis  Menstrual history/Birthing: s/p menopause  HRT after menopause: NO  Fractures:Yes: elbow fracture after falling from bunk bed in college. Collar bone fracture.  Kidney stones: No  GI Surgery:No  Duration of therapy:  as noted above  Exercise:not much  Diet:  1-2 servings of dairy/day  Ca/Vitamin D: multivitamin and vit B complex. Takes 2000 international unit(s)/day  Alcohol:  Yes: former alcoholic  Eating Disorder: Yes: h/o bullemia and anorexia in  adolescent years. Was hospitalized few times for that. She is better now.  Steroid Use:  No  PMH/PSH:  History reviewed. No pertinent past medical history.  History reviewed. No pertinent surgical history.  Family Hx:  Family History   Problem Relation Age of Onset    Eye Surgery Mother     Thyroid Disease Mother     Glaucoma Mother     Hypertension Father     Cancer Father     Diabetes No family hx of     Macular Degeneration No family hx of     Cerebrovascular Disease No family hx of     Anesthesia Reaction No family hx of     Retinal detachment No family hx of     Amblyopia No family hx of     Strabismus No family hx of               Social Hx:  Social History     Socioeconomic History    Marital status: Single     Spouse name: Not on file    Number of children: Not on file    Years of education: Not on file    Highest education level: Not on file   Occupational History    Not on file   Tobacco Use    Smoking status: Every Day     Current packs/day: 0.25     Average packs/day: 0.3 packs/day for 34.3 years (8.6 ttl pk-yrs)     Types: Cigarettes     Start date: 1/1/1990    Smokeless tobacco: Never    Tobacco comments:     4 Cig/day   Vaping Use    Vaping status: Never Used   Substance and Sexual Activity    Alcohol use: Not Currently     Comment: 1 liter Vodka / day since 20 yrs age intermittently ; Last drink 8/13/22    Drug use: Never    Sexual activity: Not Currently   Other Topics Concern    Not on file   Social History Narrative    Not on file     Social Determinants of Health     Financial Resource Strain: Low Risk  (10/26/2023)    Financial Resource Strain     Within the past 12 months, have you or your family members you live with been unable to get utilities (heat, electricity) when it was really needed?: No   Food Insecurity: Low Risk  (10/26/2023)    Food Insecurity     Within the past 12 months, did you worry that your food would run out before you got money to buy more?: No     Within the past 12  "months, did the food you bought just not last and you didn t have money to get more?: No   Transportation Needs: Low Risk  (10/26/2023)    Transportation Needs     Within the past 12 months, has lack of transportation kept you from medical appointments, getting your medicines, non-medical meetings or appointments, work, or from getting things that you need?: No   Physical Activity: Not on file   Stress: Not on file   Social Connections: Not on file   Interpersonal Safety: Low Risk  (10/26/2023)    Interpersonal Safety     Do you feel physically and emotionally safe where you currently live?: Yes     Within the past 12 months, have you been hit, slapped, kicked or otherwise physically hurt by someone?: No     Within the past 12 months, have you been humiliated or emotionally abused in other ways by your partner or ex-partner?: No   Housing Stability: High Risk (10/26/2023)    Housing Stability     Do you have housing? : Yes     Are you worried about losing your housing?: Yes          MEDICATIONS:  has a current medication list which includes the following prescription(s): contour next test, blood glucose monitoring, contour next one, bupropion, bupropion, cerave moisturizing, folic acid, hydroxyzine hcl, metformin, multiple vitamins-minerals, polyethylene glycol, sertraline, topiramate, trazodone, vitamin b-complex, and vitamin d3.    ROS     ROS: 10 point ROS neg other than the symptoms noted above in the HPI.    Physical Exam   VS: /70 (BP Location: Left arm, Patient Position: Chair, Cuff Size: Adult Regular)   Pulse 86   Temp 97.8  F (36.6  C) (Tympanic)   Resp 16   Ht 1.651 m (5' 5\")   Wt 60.6 kg (133 lb 8 oz)   LMP  (LMP Unknown)   SpO2 100%   Breastfeeding No   BMI 22.22 kg/m    GENERAL: healthy, alert and no distress  EYES: Eyes grossly normal to inspection, conjunctivae and sclerae normal  ENT: no nose swelling, nasal discharge.  Thyroid: no apparent thyroid nodules.    CV: RRR, no rubs, " gallops, no murmurs  RESP: CTAB, no wheezes, rales, or ronchi  ABDO: +BS  EXTREMITIES: no hand tremors.  NEURO: Cranial nerves grossly intact, mentation intact and speech normal  SPINE: Midline, no TTP.  SKIN: No apparent skin lesions, rash or edema seen   PSYCH: mentation appears normal, affect normal/bright, judgement and insight intact, normal speech and appearance well-groomed    LABS:  Calcium:   Latest Ref Rng 10/26/2023  12:19 PM   ENDO CALCIUM LABS[facility]     Calcium 8.6 - 10.0 mg/dL 9.2        Creatinine:  Creatinine   Date Value Ref Range Status   10/26/2023 1.02 (H) 0.51 - 0.95 mg/dL Final   11/11/2020 0.80 0.52 - 1.04 mg/dL Final       TFTs:  Lab Results   Component Value Date    TSH 3.18 10/12/2022       PTH:    Vitamin D:  Vitamin D Deficiency Screening Results:  Lab Results   Component Value Date    VITDT 94 (H) 10/26/2023    VITDT 32 10/12/2022         DEXA:  DEXA 11/2022: Severe osteoporosis on the basis of multiple vertebral fractures. (Based on CT scan)    All pertinent notes, labs, and images personally reviewed by me.     A/P  Ms.Sarah JAGJIT Núñez is a 48 year old here for the evaluation of:    Osteoporosis:  DEXA 11/2022: Severe osteoporosis on the basis of multiple vertebral fractures. (Based on CT scan)  But MRI 2023 did not identify any vertebral fractures.  She was on Fosamax for 6 months but had RENETTA?  Unclear if it was Fosamax related.  Other risk factor include current smoking, history of anorexia.  She also has upcoming dental bridge procedure coming up.  Plan:  Discussed diagnosis, pathophysiology, management and treatment options of condition with pt.  Labs needed.  Xray spine with radiology. (To rule out vertebral fractures.  Not able to order MRI in [facility])  Follow up with dentist and plan to get procedures done before next visit.  DEXA needed in 11/2024 at [place].  Follow up after that.    Risk factors for low bone density include personal history of fracture or family history, ,  advanced age, female, dementia, and poor health.  Also smoking, low BMI, Estrogen deficiency, low Ca intake, and alcoholism.  A prior history of vertebral fracture greatly increases the risk of subsequent fractures. A history of other medical diseases impacting on bone may also affect bone health.      The pt was advised to  Maintain an adequate calcium and vitamin D intake and to supplement vitamin D if needed to maintain serum levels of 25 hydroxy D (25 OH D) between 30-60 ng/ml.  Limit alcohol intake to no more than 2 servings per day.  Limit caffeine intake.  Maintain an active lifestyle including weight-bearing exercises for at least 30 mins daily.  Take measures to reduce the risk of falling.   Discussed indications, risks and benefits of all medications prescribed, and answered questions to patient's satisfaction.   The longitudinal plan of care for the diagnosis(es)/condition(s) as documented were addressed during this visit. Due to the added complexity in care, I will continue to support Mariya in the subsequent management and with ongoing continuity of care.  All questions were answered.  The patient indicates understanding of the above issues and agrees with the plan set forth.    Follow-up:  As noted in AVS    Katelynn Leonardo MD  Endocrinology  [facility]  CC: [facility], [facility]

## 2024-05-02 NOTE — LETTER
5/2/2024         RE: Mariya Núñez  [address] [address]  [facility] [address]        Dear Colleague,    Thank you for referring your patient, Mariya Núñez, to the [facility]. Please see a copy of my visit note below.    Name: Mariya Núñez  Date: 5/2/2024  Seen in consultation with [facility], [facility] for osteoporosis.     HPI:  Mariya Núñez is a 48 year old female who presents for the evaluation of osteoporosis.   has no past medical history on file.    DEXA 11/2022: Severe osteoporosis on the basis of multiple vertebral fractures. (Based on CT scan)    RISK FACTORS:  Early menopause, age <45, Current tobacco use, Fracture of elbow, multiple nontraumatic vertebral fractures   CT 9/2022 showing--Subacute fractures involving the L3, L4 and L5 vertebral bodies.   But MRI 10/2022--No compression fractures.   She does not recommend any major trauma. She reports that she was alcoholic and does not recall any major trauma.  She is sober for 2 years now.    She was started on fosamax-- but after that noted RENETTA and was stopped .  It was unclear if RENETTA was secondary to dehydration or Fosamax but it was decided not to start Fosamax after that.  She took it for 6 months.    She was on depakot for 10 years as mood stabilizer. ( In her 20s. Now off for several years)    GERD: no    Dental health: OK. No major upcoming dental procedure.  Has regular follow-up with dentistry. May need a bridge.    Kidney function: History of RENETTA.  Last creatinine check 10/2022 showing slightly high creatinine.    Previous treatment for osteopenia/osteoporosis: Fosamax    Current treatment for Osteopenia/Osteoporosis: none    Smoke:Yes: current smoker  Family History:Yes: aunt with osteoporosis  Menstrual history/Birthing: s/p menopause  HRT after menopause: NO  Fractures:Yes: elbow fracture after falling from bunk bed in college. Collar bone fracture.  Kidney stones: No  GI Surgery:No  Duration  of therapy:  as noted above  Exercise:not much  Diet:  1-2 servings of dairy/day  Ca/Vitamin D: multivitamin and vit B complex. Takes 2000 international unit(s)/day  Alcohol:  Yes: former alcoholic  Eating Disorder: Yes: h/o bullemia and anorexia in adolescent years. Was hospitalized few times for that. She is better now.  Steroid Use:  No  PMH/PSH:  History reviewed. No pertinent past medical history.  History reviewed. No pertinent surgical history.  Family Hx:  Family History   Problem Relation Age of Onset     Eye Surgery Mother      Thyroid Disease Mother      Glaucoma Mother      Hypertension Father      Cancer Father      Diabetes No family hx of      Macular Degeneration No family hx of      Cerebrovascular Disease No family hx of      Anesthesia Reaction No family hx of      Retinal detachment No family hx of      Amblyopia No family hx of      Strabismus No family hx of               Social Hx:  Social History     Socioeconomic History     Marital status: Single     Spouse name: Not on file     Number of children: Not on file     Years of education: Not on file     Highest education level: Not on file   Occupational History     Not on file   Tobacco Use     Smoking status: Every Day     Current packs/day: 0.25     Average packs/day: 0.3 packs/day for 34.3 years (8.6 ttl pk-yrs)     Types: Cigarettes     Start date: 1/1/1990     Smokeless tobacco: Never     Tobacco comments:     4 Cig/day   Vaping Use     Vaping status: Never Used   Substance and Sexual Activity     Alcohol use: Not Currently     Comment: 1 liter Vodka / day since 20 yrs age intermittently ; Last drink 8/13/22     Drug use: Never     Sexual activity: Not Currently   Other Topics Concern     Not on file   Social History Narrative     Not on file     Social Determinants of Health     Financial Resource Strain: Low Risk  (10/26/2023)    Financial Resource Strain      Within the past 12 months, have you or your family members you live with been  "unable to get utilities (heat, electricity) when it was really needed?: No   Food Insecurity: Low Risk  (10/26/2023)    Food Insecurity      Within the past 12 months, did you worry that your food would run out before you got money to buy more?: No      Within the past 12 months, did the food you bought just not last and you didn t have money to get more?: No   Transportation Needs: Low Risk  (10/26/2023)    Transportation Needs      Within the past 12 months, has lack of transportation kept you from medical appointments, getting your medicines, non-medical meetings or appointments, work, or from getting things that you need?: No   Physical Activity: Not on file   Stress: Not on file   Social Connections: Not on file   Interpersonal Safety: Low Risk  (10/26/2023)    Interpersonal Safety      Do you feel physically and emotionally safe where you currently live?: Yes      Within the past 12 months, have you been hit, slapped, kicked or otherwise physically hurt by someone?: No      Within the past 12 months, have you been humiliated or emotionally abused in other ways by your partner or ex-partner?: No   Housing Stability: High Risk (10/26/2023)    Housing Stability      Do you have housing? : Yes      Are you worried about losing your housing?: Yes          MEDICATIONS:  has a current medication list which includes the following prescription(s): contour next test, blood glucose monitoring, contour next one, bupropion, bupropion, cerave moisturizing, folic acid, hydroxyzine hcl, metformin, multiple vitamins-minerals, polyethylene glycol, sertraline, topiramate, trazodone, vitamin b-complex, and vitamin d3.    ROS     ROS: 10 point ROS neg other than the symptoms noted above in the HPI.    Physical Exam   VS: /70 (BP Location: Left arm, Patient Position: Chair, Cuff Size: Adult Regular)   Pulse 86   Temp 97.8  F (36.6  C) (Tympanic)   Resp 16   Ht 1.651 m (5' 5\")   Wt 60.6 kg (133 lb 8 oz)   LMP  (LMP " Unknown)   SpO2 100%   Breastfeeding No   BMI 22.22 kg/m    GENERAL: healthy, alert and no distress  EYES: Eyes grossly normal to inspection, conjunctivae and sclerae normal  ENT: no nose swelling, nasal discharge.  Thyroid: no apparent thyroid nodules.    CV: RRR, no rubs, gallops, no murmurs  RESP: CTAB, no wheezes, rales, or ronchi  ABDO: +BS  EXTREMITIES: no hand tremors.  NEURO: Cranial nerves grossly intact, mentation intact and speech normal  SPINE: Midline, no TTP.  SKIN: No apparent skin lesions, rash or edema seen   PSYCH: mentation appears normal, affect normal/bright, judgement and insight intact, normal speech and appearance well-groomed    LABS:  Calcium:   Latest Ref Rng 10/26/2023  12:19 PM   ENDO CALCIUM LABS-[facility]     Calcium 8.6 - 10.0 mg/dL 9.2        Creatinine:  Creatinine   Date Value Ref Range Status   10/26/2023 1.02 (H) 0.51 - 0.95 mg/dL Final   11/11/2020 0.80 0.52 - 1.04 mg/dL Final       TFTs:  Lab Results   Component Value Date    TSH 3.18 10/12/2022       PTH:    Vitamin D:  Vitamin D Deficiency Screening Results:  Lab Results   Component Value Date    VITDT 94 (H) 10/26/2023    VITDT 32 10/12/2022         DEXA:  DEXA 11/2022: Severe osteoporosis on the basis of multiple vertebral fractures. (Based on CT scan)    All pertinent notes, labs, and images personally reviewed by me.     A/P  Ms.Sarah JAGJIT Núñez is a 48 year old here for the evaluation of:    Osteoporosis:  DEXA 11/2022: Severe osteoporosis on the basis of multiple vertebral fractures. (Based on CT scan)  But MRI 2023 did not identify any vertebral fractures.  She was on Fosamax for 6 months but had RENETTA?  Unclear if it was Fosamax related.  Other risk factor include current smoking, history of anorexia.  She also has upcoming dental bridge procedure coming up.  Plan:  Discussed diagnosis, pathophysiology, management and treatment options of condition with pt.  Labs needed.  Xray spine with radiology. (To rule out vertebral  fractures.  Not able to order MRI in [facility])  Follow up with dentist and plan to get procedures done before next visit.  DEXA needed in 11/2024 at [place].  Follow up after that.    Risk factors for low bone density include personal history of fracture or family history, , advanced age, female, dementia, and poor health.  Also smoking, low BMI, Estrogen deficiency, low Ca intake, and alcoholism.  A prior history of vertebral fracture greatly increases the risk of subsequent fractures. A history of other medical diseases impacting on bone may also affect bone health.      The pt was advised to  Maintain an adequate calcium and vitamin D intake and to supplement vitamin D if needed to maintain serum levels of 25 hydroxy D (25 OH D) between 30-60 ng/ml.  Limit alcohol intake to no more than 2 servings per day.  Limit caffeine intake.  Maintain an active lifestyle including weight-bearing exercises for at least 30 mins daily.  Take measures to reduce the risk of falling.   Discussed indications, risks and benefits of all medications prescribed, and answered questions to patient's satisfaction.   The longitudinal plan of care for the diagnosis(es)/condition(s) as documented were addressed during this visit. Due to the added complexity in care, I will continue to support Mariya in the subsequent management and with ongoing continuity of care.  All questions were answered.  The patient indicates understanding of the above issues and agrees with the plan set forth.    Follow-up:  As noted in AVS    Katelynn Leonardo MD  Endocrinology  [facility]  CC: Clinic, [facility] [place]      Again, thank you for allowing me to participate in the care of your patient.        Sincerely,        Katelynn Leonardo MD

## 2024-05-02 NOTE — PATIENT INSTRUCTIONS
-[facility]  Dr Leonardo, Endocrinology Department    [facility]   [address] [facility] [address]  [place] [address]  Appointment [phone]  Fax: [phone]  [place]: Monday - Thursday      Please check the cost coverage and copay with insurance before recommended tests, services and medications (especially if new medications are prescribed).     If ordered, please get blood work done 1 week prior to your next appointment so they will be available to Dr. Leonardo at your visit.    Labs needed.  Xray spine with radiology.  Follow up with dentist and plan to get procedures done before next visit.  DEXA needed in 2024 at [place].  Follow up after that.     [facility] radiology scheduleing   [place]  [phone]   [facility] Radiology scheduling  [place]  [phone]     Please call and schedule the recommended test as discussed in clinic visit. These are the numbers to call.    The pt was advised to  Maintain an adequate calcium and vitamin D intake and to supplement vitamin D if needed to maintain serum levels of 25 hydroxy D (25 OH D) between 30-60 ng/ml.  Limit alcohol intake to no more than 2 servings per day.  Limit caffeine intake.  Maintain an active lifestyle including weight-bearing exercises for at least 30 mins daily.  Take measures to reduce the risk of falling.     You should get 1200 mg/day calcium in divided doses of no more than 500 mg/dose.  This INCLUDES what is in your food as well as what is in any supplements you may be taking.    Dietary sources of calcium:: These also contain vitamin D  Milk                            8 oz            300 mg calcium  Yogurt                          1 cup           400 mg calcium   Hard cheese                     1.5 oz          300 mg  Cottage cheese                  2 cup           300 mg  Orange juice with Calcium       8 oz            300 mg  Low fat dairy sources are recommended        You  should get 30 minutes of moderate weight bearing exercise on most days of the week .  Weight bearing exercise includes such things as walking, jogging, hiking, dancing.  You should also get Strength training 2 or more times/week in addition to other weight -being exercise. Strength training uses weight or resistance beyond that seen in everyday activities -(pilates, weight training with free weights, weight machines or resistance bands)     Living with Osteoporosis: Preventing Fractures  If you have osteoporosis, you can do a lot to reduce its effect on your life. Knowing how to prevent fractures and spinal curvature can help you live more comfortably and safely with this disease.  Reducing your risk for fractures  The most common fracture sites in people with osteoporosis are the wrist, spine, and hip. These fractures are often caused by accidents and falls. All fractures are painful and may limit what you can do. But hip fractures are very serious. They often need surgery, and it can take months to recover. To reduce your risk for fractures:  Get regular exercise. Try walking, swimming, or weight training.  Eat foods that are rich in calcium, or take calcium supplements.  Make your home safe to help avoid accidents.  Take extra precautions not to fall in risky areas, such as icy sidewalks.  Understanding spinal fractures  Your spine is made up of many bones called vertebrae. Osteoporosis can cause the vertebrae in your spine to collapse. As a result, your upper back may arch forward, creating a curvature. Spine fractures may also result from back strain and bad posture. You will also lose height. Your lower spine must then adjust to keep your body balanced. This can cause back pain. To prevent or lessen these spinal changes:  Practice good posture.  Use proper techniques if you need to lift heavy objects.  Do back exercises to help your posture.  Lie on your back when you have pain.  Ask your healthcare provider  about these and other ways to help your spine.  [employer] last reviewed this educational content on 5/1/2018 2000-2021 [employer]. All rights reserved. This information is not intended as a substitute for professional medical care. Always follow your healthcare professional's instructions.          Living with Osteoporosis: Regular Exercise  If you have osteoporosis, exercise is vital for your health. It can prevent bone fractures and spine changes. It will slow bone loss. Exercise will strengthen your body. It can also be fun. A variety of exercises is best. See below for exercises that can help you. But before you start, talk with your healthcare provider to be sure these exercises are right for you.   Resistance exercises. These build muscle strength and maintain bone mass. They also make you less prone to injury. Exercises include lifting small weights, doing push-ups and sit-ups, using elastic exercise bands, and using weight machines.   Weight-bearing activities. These help your whole body. They also help you maintain bone mass. Activities include walking, dancing, and housework.   Non-weight-bearing exercises. These help prevent back strain and pain. They do this by building the trunk and leg muscles. Exercises that help with flexibility can prevent falls. Examples include swimming, water exercise, and stretching.   Staying safe  Here are tips to stay safe:   Always check with your healthcare provider before starting any new exercise program.  Use weights only as instructed.  Stop any exercise that causes pain.  [employer] last reviewed this educational content on 5/1/2018 2000-2021 [employer]. All rights reserved. This information is not intended as a substitute for professional medical care. Always follow your healthcare professional's instructions.          Preventing Osteoporosis: Avoiding Bone Loss  Certain factors can speed up bone loss or decrease bone growth. For  example, alcohol, cigarettes, and certain medicines reduce bone mass. Some foods make it hard for your body to absorb calcium.  Things to avoid  Here are things to avoid to help prevent osteoporosis:  Alcohol. This is toxic to bones. It is a major cause of bone loss. Heavy drinking can cause osteoporosis even if you have no other risk factors.  Smoking. This reduces bone mass. Smoking may also interfere with estrogen levels and cause early menopause.  Inactivity. Not being active makes your bones lose strength and become thinner. Over time, thin bones may break. Women who aren't active are at a high risk for osteoporosis.  Certain medicines. Some medicines, such as cortisone, increase bone loss. They also decrease bone growth. Ask your healthcare provider about any side effects of your medicines, and how to prevent them.  Protein-rich or salty foods. Eaten in large amounts, these foods may deplete calcium.  Caffeine. This increases calcium loss. People who drink a lot of coffee, tea, or soda lose more calcium than those who don't.  [employer] last reviewed this educational content on 5/1/2018 2000-2021 [employer]. All rights reserved. This information is not intended as a substitute for professional medical care. Always follow your healthcare professional's instructions.          Preventing Osteoporosis: Meeting Your Calcium Needs  Your body needs calcium to build and repair bones. But it can't make calcium on its own. That's why it's important to eat calcium-rich foods. Some foods are naturally rich in calcium. Others have calcium added (fortified). It's best to get calcium from the foods you eat. But if you can't get enough, you may want to take calcium supplements. To meet your daily calcium needs, try the foods listed below.                          Dairy Fish & beans Other sources   Source   Calcium (mg) per serving   Source   Calcium (mg) per serving   Source   Calcium (mg) per serving    Low-fat yogurt, plain   415 mg/8 oz.   Sardines, Atlantic, canned, with bones   351 mg/3 oz.   Oatmeal, instant, fortified   215 mg/1 cup   Nonfat milk   302 mg/1 cup   Riley, sockeye, canned, with bones   239 mg/3 oz.   Tofu made with calcium sulfate   204 mg/3 oz.   Low-fat milk   297 mg/1 cup   Soybeans, fresh, boiled   131 mg/1/2 cup   Collards   179 mg/1/2 cup   Swiss cheese   272 mg/1 oz.   White beans, cooked   81 mg/1/2 cup   English muffin, whole wheat   175 mg/1 muffin   Cheddar cheese   205 mg/1 oz.   Navy beans, cooked   79 mg/1/2 cup   Kale   90 mg/1/2 cup   Ice cream strawberry   79 mg/1/2 cup           Orange, navel   56 mg/1 medium   Note: Calcium levels may vary depending on brand and size.  Daily calcium needs  14 to 18 years old: 1,300 mg  19 to 30 years old: 1,000 mg  31 to 50 years old: 1,000 mg  51 to 70 years old, women: 1,200 mg  51 to 70 years old, men: 1,000 mg  Pregnant or nursin to 18 years old: 1,300 mg, 19 to 50 years old: 1,000 mg  Older than 70 (women and men): 1,200 mg   [employer] last reviewed this educational content on 2018-2021 [employer]. All rights reserved. This information is not intended as a substitute for professional medical care. Always follow your healthcare professional's instructions.

## 2024-05-04 LAB
CALCIUM SERPL-MCNC: 9.4 MG/DL (ref 8.6–10)
CREAT SERPL-MCNC: 0.81 MG/DL (ref 0.51–0.95)
EGFRCR SERPLBLD CKD-EPI 2021: 90 ML/MIN/1.73M2
PTH-INTACT SERPL-MCNC: 13 PG/ML (ref 15–65)
VIT D+METAB SERPL-MCNC: 88 NG/ML (ref 20–50)

## 2024-05-06 DIAGNOSIS — E11.9 TYPE 2 DIABETES MELLITUS WITHOUT COMPLICATION, WITHOUT LONG-TERM CURRENT USE OF INSULIN (H): ICD-10-CM

## 2024-05-07 RX ORDER — METFORMIN HCL 500 MG
TABLET, EXTENDED RELEASE 24 HR ORAL
Qty: 180 TABLET | Refills: 0 | Status: SHIPPED | OUTPATIENT
Start: 2024-05-07 | End: 2024-06-04

## 2024-05-09 DIAGNOSIS — M81.0 OSTEOPOROSIS OF VERTEBRA: Primary | ICD-10-CM

## 2024-06-04 DIAGNOSIS — E11.9 TYPE 2 DIABETES MELLITUS WITHOUT COMPLICATION, WITHOUT LONG-TERM CURRENT USE OF INSULIN (H): ICD-10-CM

## 2024-06-04 RX ORDER — METFORMIN HCL 500 MG
TABLET, EXTENDED RELEASE 24 HR ORAL
Qty: 120 TABLET | Refills: 0 | Status: SHIPPED | OUTPATIENT
Start: 2024-06-04 | End: 2024-07-18

## 2024-06-05 NOTE — TELEPHONE ENCOUNTER
Dayanara refills given   Further refills after follow up as scheduled on 6/11 >> due since 1/2024. Pt to keep up the appt.     Thank you  Laura Vale MD on 6/4/2024

## 2024-06-23 ENCOUNTER — HEALTH MAINTENANCE LETTER (OUTPATIENT)
Age: 49
End: 2024-06-23

## 2024-07-15 ENCOUNTER — MYC MEDICAL ADVICE (OUTPATIENT)
Dept: FAMILY MEDICINE | Facility: CLINIC | Age: 49
End: 2024-07-15
Payer: COMMERCIAL

## 2024-07-16 ENCOUNTER — NURSE TRIAGE (OUTPATIENT)
Dept: FAMILY MEDICINE | Facility: CLINIC | Age: 49
End: 2024-07-16
Payer: COMMERCIAL

## 2024-07-16 NOTE — TELEPHONE ENCOUNTER
[facility] (supporting Laura Vale MD)12 hours ago (8:09 PM)     SW  I have missed my last appointment due to work.  It was for my A1C.    I will schedule for that now.  Starting yesterday, I have been having chest pains that I think is heartburns.  I boughts Tums today and they went away for a couple hours and then came back.  I smoke so thought that this might contribute to my heartburn.  Can I look at this at my next visit?  Reason for Disposition   Chest pain lasting longer than 5 minutes and occurred in last 3 days (72 hours) (Exception: Feels exactly the same as previously diagnosed heartburn and has accompanying sour taste in mouth.)    Additional Information   Negative: SEVERE difficulty breathing (e.g., struggling for each breath, speaks in single words)   Negative: Difficult to awaken or acting confused (e.g., disoriented, slurred speech)   Negative: Shock suspected (e.g., cold/pale/clammy skin, too weak to stand, low BP, rapid pulse)   Negative: Passed out (i.e., lost consciousness, collapsed and was not responding)   Negative: Chest pain lasting longer than 5 minutes and ANY of the following:         Pain is crushing, pressure-like, or heavy         Over 44 years old          Over 30 years old and one cardiac risk factor (e.g diabetes, high blood pressure, high cholesterol, smoker, or family history of heart disease)         History of heart disease (e.g. angina, heart attack, heart failure, bypass surgery, takes nitroglycerin)   Negative: Followed an injury to chest   Negative: Heart beating < 50 beats per minute OR > 140 beats per minute   Negative: Visible sweat on face or sweat dripping down face   Negative: Sounds like a life-threatening emergency to the triager   Negative: SEVERE chest pain   Negative: Pain also in shoulder(s) or arm(s) or jaw   Negative: Difficulty breathing   Negative: Cocaine use within last 3 days   Negative: Major surgery in  "the past month   Negative: Hip or leg fracture (broken bone) in past month (or had cast on leg or ankle in past month)   Negative: Illness requiring prolonged bedrest in past month (e.g., immobilization, long hospital stay)   Negative: Long-distance travel in past month (e.g., car, bus, train, plane; with trip lasting 6 or more hours)   Negative: History of prior 'blood clot' in leg or lungs (i.e., deep vein thrombosis, pulmonary embolism)   Negative: History of inherited increased risk of blood clots (e.g., Factor 5 Leiden, Anti-thrombin 3, Protein C or Protein S deficiency, Prothrombin mutation)   Negative: Cancer treatment in the past two months (or has cancer now)   Negative: Heart beating irregularly or very rapidly    Answer Assessment - Initial Assessment Questions  1. LOCATION: \"Where does it hurt?\"        No pain at this time. Just woke up. See MyChart.  Middle, under chest bone above breasts  and to the right a little bit right.  When has the chest pain it feels sore to the touch.   2. RADIATION: \"Does the pain go anywhere else?\" (e.g., into neck, jaw, arms, back)      Denies radiation.   3. ONSET: \"When did the chest pain begin?\" (Minutes, hours or days)       Saturday during the day and Sunday throughout the day. Had yesterday too.   4. PATTERN: \"Does the pain come and go, or has it been constant since it started?\"  \"Does it get worse with exertion?\"       Comes and goes. Not worse with exertion.   5. DURATION: \"How long does it last\" (e.g., seconds, minutes, hours)      Several minutes and up to an hour or more.   6. SEVERITY: \"How bad is the pain?\"  (e.g., Scale 1-10; mild, moderate, or severe)     - MILD (1-3): doesn't interfere with normal activities      - MODERATE (4-7): interferes with normal activities or awakens from sleep     - SEVERE (8-10): excruciating pain, unable to do any normal activities        5/10. Stops what doing and rubs on it.   7. CARDIAC RISK FACTORS: \"Do you have any history of " "heart problems or risk factors for heart disease?\" (e.g., angina, prior heart attack; diabetes, high blood pressure, high cholesterol, smoker, or strong family history of heart disease)      Diabetes, smoker. History of heart burn. Grandpa had a stroke.   8. PULMONARY RISK FACTORS: \"Do you have any history of lung disease?\"  (e.g., blood clots in lung, asthma, emphysema, birth control pills)      none  9. CAUSE: \"What do you think is causing the chest pain?\"      Thinking it could be heart burn. Seems to improve a little bit with Tums. Does not eat spicy or greasy foods. Pain goes for dull to sharp. Does have some burping.   10. OTHER SYMPTOMS: \"Do you have any other symptoms?\" (e.g., dizziness, nausea, vomiting, sweating, fever, difficulty breathing, cough)        Nausea but not related to chest pain.   11. PREGNANCY: \"Is there any chance you are pregnant?\" \"When was your last menstrual period?\"        Reports not pregnant. In menopause    Protocols used: Chest Pain-A-OH    "

## 2024-07-16 NOTE — TELEPHONE ENCOUNTER
RN Referral to Acute and Diagnostic Services    [phone] ([place]) [place] [address] [facility], [address], [place] [address]           Presenting symptoms/reason for [facility]S referral:  intermittent chest pain the last 3 days. Upper sternal and a little to the right.  Lasts up to an hour or two. Goes from sharp to dull. Tried Tums with maybe some improvement.     Relevant Medical History:  diabetes, current smoker    Transition to [facility]S clinic discussed with patient and patient is agreeable.    Patient has been advised that appointments at [facility] typically takes significantly longer than in clinic/urgent care (~ 2.5-3 hours or more):  YES  Patient has transportation and is available for ASAP same day appointment: Yes  Patient aware that [facility] will contact them directly YES, triage nurse will contact patient back     Does patient does have claustrophobia No  Patient has contrast allergy No  Patient has PICC line or port in place No         ADS clinic has declined this patient.  Per provider Devora Dorman PA-C patient should be seen at  now.    Patient refused to go to  now. Does agree to go in right away if chest pain returns. She states that she will be in [place] working today and will go to ED there if chest pain returns.   Chacha Tena RN

## 2024-07-18 DIAGNOSIS — E11.9 TYPE 2 DIABETES MELLITUS WITHOUT COMPLICATION, WITHOUT LONG-TERM CURRENT USE OF INSULIN (H): ICD-10-CM

## 2024-07-18 RX ORDER — METFORMIN HCL 500 MG
TABLET, EXTENDED RELEASE 24 HR ORAL
Qty: 120 TABLET | Refills: 0 | Status: SHIPPED | OUTPATIENT
Start: 2024-07-18 | End: 2024-07-20

## 2024-07-19 NOTE — TELEPHONE ENCOUNTER
Refill done.  Maximum Refills Reached. Further refills after follow up as scheduled on 9/2024      Laura Vale MD on 7/18/2024

## 2024-07-20 RX ORDER — METFORMIN HCL 500 MG
TABLET, EXTENDED RELEASE 24 HR ORAL
Qty: 120 TABLET | Refills: 0 | Status: SHIPPED | OUTPATIENT
Start: 2024-07-20 | End: 2024-09-03

## 2024-07-21 NOTE — TELEPHONE ENCOUNTER
Short Refill done AGAIN.     Further refills after follow up as scheduled in 9/2024.     Laura Vale MD on 7/20/2024

## 2024-09-03 DIAGNOSIS — E11.9 TYPE 2 DIABETES MELLITUS WITHOUT COMPLICATION, WITHOUT LONG-TERM CURRENT USE OF INSULIN (H): ICD-10-CM

## 2024-09-03 RX ORDER — METFORMIN HCL 500 MG
TABLET, EXTENDED RELEASE 24 HR ORAL
Qty: 120 TABLET | Refills: 0 | Status: SHIPPED | OUTPATIENT
Start: 2024-09-03 | End: 2024-09-06

## 2024-09-06 ENCOUNTER — OFFICE VISIT (OUTPATIENT)
Dept: FAMILY MEDICINE | Facility: CLINIC | Age: 49
End: 2024-09-06
Payer: COMMERCIAL

## 2024-09-06 VITALS
OXYGEN SATURATION: 95 % | RESPIRATION RATE: 14 BRPM | SYSTOLIC BLOOD PRESSURE: 93 MMHG | DIASTOLIC BLOOD PRESSURE: 67 MMHG | BODY MASS INDEX: 22.86 KG/M2 | TEMPERATURE: 96.9 F | HEART RATE: 90 BPM | WEIGHT: 137.4 LBS

## 2024-09-06 DIAGNOSIS — Z12.31 ENCOUNTER FOR SCREENING MAMMOGRAM FOR BREAST CANCER: ICD-10-CM

## 2024-09-06 DIAGNOSIS — E83.42 HYPOMAGNESEMIA: ICD-10-CM

## 2024-09-06 DIAGNOSIS — Z23 NEED FOR VACCINATION: ICD-10-CM

## 2024-09-06 DIAGNOSIS — M80.00XD OSTEOPOROSIS WITH CURRENT PATHOLOGICAL FRACTURE WITH ROUTINE HEALING, UNSPECIFIED OSTEOPOROSIS TYPE, SUBSEQUENT ENCOUNTER: ICD-10-CM

## 2024-09-06 DIAGNOSIS — M47.816 LUMBAR SPONDYLOSIS: ICD-10-CM

## 2024-09-06 DIAGNOSIS — F10.21 ALCOHOL DEPENDENCE IN REMISSION (H): ICD-10-CM

## 2024-09-06 DIAGNOSIS — F33.9 RECURRENT MAJOR DEPRESSIVE DISORDER, REMISSION STATUS UNSPECIFIED (H): ICD-10-CM

## 2024-09-06 DIAGNOSIS — M81.0 OSTEOPOROSIS OF VERTEBRA: ICD-10-CM

## 2024-09-06 DIAGNOSIS — R87.619 ABNORMAL CERVICAL PAPANICOLAOU SMEAR, UNSPECIFIED ABNORMAL PAP FINDING: ICD-10-CM

## 2024-09-06 DIAGNOSIS — Z12.4 CERVICAL CANCER SCREENING: ICD-10-CM

## 2024-09-06 DIAGNOSIS — E11.42 TYPE 2 DIABETES MELLITUS WITH DIABETIC POLYNEUROPATHY, WITHOUT LONG-TERM CURRENT USE OF INSULIN (H): ICD-10-CM

## 2024-09-06 DIAGNOSIS — S32.009D CLOSED FRACTURE OF LUMBAR VERTEBRA WITH ROUTINE HEALING, UNSPECIFIED FRACTURE MORPHOLOGY, UNSPECIFIED LUMBAR VERTEBRAL LEVEL, SUBSEQUENT ENCOUNTER: ICD-10-CM

## 2024-09-06 DIAGNOSIS — Z00.00 ROUTINE GENERAL MEDICAL EXAMINATION AT A HEALTH CARE FACILITY: Primary | ICD-10-CM

## 2024-09-06 LAB
CREAT UR-MCNC: 158 MG/DL
ERYTHROCYTE [DISTWIDTH] IN BLOOD BY AUTOMATED COUNT: 13 % (ref 10–15)
HBA1C MFR BLD: 5.2 % (ref 0–5.6)
HCT VFR BLD AUTO: 38.6 % (ref 35–47)
HGB BLD-MCNC: 13 G/DL (ref 11.7–15.7)
MCH RBC QN AUTO: 31 PG (ref 26.5–33)
MCHC RBC AUTO-ENTMCNC: 33.7 G/DL (ref 31.5–36.5)
MCV RBC AUTO: 92 FL (ref 78–100)
MICROALBUMIN UR-MCNC: <12 MG/L
MICROALBUMIN/CREAT UR: NORMAL MG/G{CREAT}
PLATELET # BLD AUTO: 218 10E3/UL (ref 150–450)
RBC # BLD AUTO: 4.2 10E6/UL (ref 3.8–5.2)
WBC # BLD AUTO: 8.2 10E3/UL (ref 4–11)

## 2024-09-06 PROCEDURE — 85027 COMPLETE CBC AUTOMATED: CPT | Performed by: INTERNAL MEDICINE

## 2024-09-06 PROCEDURE — 99396 PREV VISIT EST AGE 40-64: CPT | Mod: 25 | Performed by: INTERNAL MEDICINE

## 2024-09-06 PROCEDURE — 80053 COMPREHEN METABOLIC PANEL: CPT | Performed by: INTERNAL MEDICINE

## 2024-09-06 PROCEDURE — 90472 IMMUNIZATION ADMIN EACH ADD: CPT | Performed by: INTERNAL MEDICINE

## 2024-09-06 PROCEDURE — 90471 IMMUNIZATION ADMIN: CPT | Performed by: INTERNAL MEDICINE

## 2024-09-06 PROCEDURE — 83735 ASSAY OF MAGNESIUM: CPT | Performed by: INTERNAL MEDICINE

## 2024-09-06 PROCEDURE — 36415 COLL VENOUS BLD VENIPUNCTURE: CPT | Performed by: INTERNAL MEDICINE

## 2024-09-06 PROCEDURE — 90715 TDAP VACCINE 7 YRS/> IM: CPT | Performed by: INTERNAL MEDICINE

## 2024-09-06 PROCEDURE — 99214 OFFICE O/P EST MOD 30 MIN: CPT | Mod: 25 | Performed by: INTERNAL MEDICINE

## 2024-09-06 PROCEDURE — 90656 IIV3 VACC NO PRSV 0.5 ML IM: CPT | Performed by: INTERNAL MEDICINE

## 2024-09-06 PROCEDURE — 83036 HEMOGLOBIN GLYCOSYLATED A1C: CPT | Performed by: INTERNAL MEDICINE

## 2024-09-06 PROCEDURE — 82043 UR ALBUMIN QUANTITATIVE: CPT | Performed by: INTERNAL MEDICINE

## 2024-09-06 PROCEDURE — 80061 LIPID PANEL: CPT | Performed by: INTERNAL MEDICINE

## 2024-09-06 PROCEDURE — 82306 VITAMIN D 25 HYDROXY: CPT | Performed by: INTERNAL MEDICINE

## 2024-09-06 PROCEDURE — 82570 ASSAY OF URINE CREATININE: CPT | Performed by: INTERNAL MEDICINE

## 2024-09-06 PROCEDURE — 84443 ASSAY THYROID STIM HORMONE: CPT | Performed by: INTERNAL MEDICINE

## 2024-09-06 RX ORDER — METFORMIN HCL 500 MG
TABLET, EXTENDED RELEASE 24 HR ORAL
Qty: 369 TABLET | Refills: 3 | Status: SHIPPED | OUTPATIENT
Start: 2024-09-06

## 2024-09-06 ASSESSMENT — PATIENT HEALTH QUESTIONNAIRE - PHQ9
10. IF YOU CHECKED OFF ANY PROBLEMS, HOW DIFFICULT HAVE THESE PROBLEMS MADE IT FOR YOU TO DO YOUR WORK, TAKE CARE OF THINGS AT HOME, OR GET ALONG WITH OTHER PEOPLE: SOMEWHAT DIFFICULT
SUM OF ALL RESPONSES TO PHQ QUESTIONS 1-9: 1
SUM OF ALL RESPONSES TO PHQ QUESTIONS 1-9: 1

## 2024-09-06 ASSESSMENT — PAIN SCALES - GENERAL: PAINLEVEL: NO PAIN (0)

## 2024-09-06 NOTE — PROGRESS NOTES
Preventive Care Visit  [facility] CLAUDIO Vale MD, Internal Medicine  Sep 6, 2024          Assessment and Plan  1. Routine general medical examination at a health care facility    Last seen patient in October 2023 for annual physical at that time, she is here for diabetes follow-up.On the lab work done at that time showing CKD with creatinine 1.02, hypervitaminosis D.  Patient opting this to be changed as annual physical.  Will do as requested.    Glucometer BG ranges 87 , 94 , 86 , 90 reviewed.  Patient A1c remaining stable at 5.2, will refill current metformin as opted.      - TDAP 10-64Y (ADACEL,BOOSTRIX)  - REVIEW OF HEALTH MAINTENANCE PROTOCOL ORDERS  - Adult Eye  Referral; Future  - HEMOGLOBIN A1C; Future  - INFLUENZA VACCINE,SPLIT VIRUS,TRIVALENT,PF(FLUZONE)  - Ob/Gyn  Referral; Future  - Albumin Random Urine Quantitative with Creat Ratio; Future  - Comprehensive metabolic panel (BMP + Alb, Alk Phos, ALT, AST, Total. Bili, TP); Future  - CBC with platelets; Future  - metFORMIN (GLUCOPHAGE XR) 500 MG 24 hr tablet; TAKE 2 TABLETS BY MOUTH TWICE A DAY WITH MEALS  Dispense: 369 tablet; Refill: 3  - PRIMARY CARE FOLLOW-UP SCHEDULING; Future  - Lipid panel reflex to direct LDL Fasting; Future  - Vitamin D deficiency screening; Future  - MA Screen Bilateral w/Avila; Future  - TSH with free T4 reflex; Future  - HEMOGLOBIN A1C  - Albumin Random Urine Quantitative with Creat Ratio  - Comprehensive metabolic panel (BMP + Alb, Alk Phos, ALT, AST, Total. Bili, TP)  - CBC with platelets  - Lipid panel reflex to direct LDL Fasting  - Vitamin D deficiency screening  - TSH with free T4 reflex    2. Type 2 diabetes mellitus with diabetic polyneuropathy, without long-term current use of insulin (H)  - Adult Eye  Referral; Future  - HEMOGLOBIN A1C; Future  - Albumin Random Urine Quantitative with Creat Ratio; Future  - Comprehensive metabolic panel (BMP + Alb, Alk Phos,  ALT, AST, Total. Bili, TP); Future  - HEMOGLOBIN A1C  - Albumin Random Urine Quantitative with Creat Ratio  - Comprehensive metabolic panel (BMP + Alb, Alk Phos, ALT, AST, Total. Bili, TP)    3. Alcohol dependence in remission (H)  Patient is completely sober as she endorses.    4. Recurrent major depressive disorder, remission status unspecified (H24)  Chronic stable, continue to follow current [place] at [facility] and going to see her , not opting any new referrals offered at this time. [place] .   - CBC with platelets; Future  - TSH with free T4 reflex; Future  - CBC with platelets  - TSH with free T4 reflex    5. Osteoporosis of vertebra  6. Osteoporosis with current pathological fracture with routine healing, unspecified osteoporosis type, subsequent encounter  7. Closed fracture of lumbar vertebra with routine healing, unspecified fracture morphology, unspecified lumbar vertebral level, subsequent encounter  Ongoing problem, patient following endocrinology with medications as managed by them.  She does endorses that she has upcoming dental procedures scheduled for which they are holding off on the osteoporosis therapy for few weeks before restarting.  To follow-up with recommendations of endocrinology.  - Vitamin D deficiency screening    8. Lumbar spondylosis  Patient was previously following with physical therapy but due to cost issues she has stopped doing them and following home YouTube videos on the physical therapist.  Emphasized to update us if no improvement.  - Comprehensive metabolic panel (BMP + Alb, Alk Phos, ALT, AST, Total. Bili, TP)    9. Hypomagnesemia  - Magnesium; Future    10. Encounter for screening mammogram for breast cancer  - MA Screen Bilateral w/Avila; Future    11. Abnormal cervical Papanicolaou smear, unspecified abnormal pap finding  12. Cervical cancer screening  Patient supposed to follow-up with gynecology in 2023 for colposcopy which she forgot to do it as she  endorses.  Given instructions in the AVS below for gynecology contact numbers so she can get in.  Patient understood and agreed the plan and understands all the complications and risks of not doing it.  - Ob/Gyn  Referral; Future    13. Need for vaccination  - TDAP 10-64Y (ADACEL,BOOSTRIX)  - INFLUENZA VACCINE,SPLIT VIRUS,TRIVALENT,PF(FLUZONE)         Please note that this note consists of symbols derived from keyboarding, dictation and/or voice recognition software. As a result, there may be errors in the script that have gone undetected. Please consider this when interpreting information found in this chart.    Patient Instructions   As discussed, please do fasting LAB only appointment    Please call your OBGYN as below and get the Colposcopy due since 2023  [facility] [phone]    Please follow up with your Psychiatry on medications as managed by them.     Will give refills for Metformin for whole year as requested.     ========================  Patient Education  Preventive Care Advice   This is general advice given by our system to help you stay healthy. However, your care team may have specific advice just for you. Please talk to your care team about your preventive care needs.  Nutrition  Eat 5 or more servings of fruits and vegetables each day.  Try wheat bread, brown rice and whole grain pasta (instead of white bread, rice, and pasta).  Get enough calcium and vitamin D. Check the label on foods and aim for 100% of the RDA (recommended daily allowance).  Lifestyle  Exercise at least 150 minutes each week  (30 minutes a day, 5 days a week).  Do muscle strengthening activities 2 days a week. These help control your weight and prevent disease.  No smoking.  Wear sunscreen to prevent skin cancer.  Have a dental exam and cleaning every 6 months.  Yearly exams  See your health care team every year to talk about:  Any changes in your health.  Any medicines your care team  has prescribed.  Preventive care, family planning, and ways to prevent chronic diseases.  Shots (vaccines)   HPV shots (up to age 26), if you've never had them before.  Hepatitis B shots (up to age 59), if you've never had them before.  COVID-19 shot: Get this shot when it's due.  Flu shot: Get a flu shot every year.  Tetanus shot: Get a tetanus shot every 10 years.  Pneumococcal, hepatitis A, and RSV shots: Ask your care team if you need these based on your risk.  Shingles shot (for age 50 and up)  General health tests  Diabetes screening:  Starting at age 35, Get screened for diabetes at least every 3 years.  If you are younger than age 35, ask your care team if you should be screened for diabetes.  Cholesterol test: At age 39, start having a cholesterol test every 5 years, or more often if advised.  Bone density scan (DEXA): At age 50, ask your care team if you should have this scan for osteoporosis (brittle bones).  Hepatitis C: Get tested at least once in your life.  STIs (sexually transmitted infections)  Before age 24: Ask your care team if you should be screened for STIs.  After age 24: Get screened for STIs if you're at risk. You are at risk for STIs (including HIV) if:  You are sexually active with more than one person.  You don't use condoms every time.  You or a partner was diagnosed with a sexually transmitted infection.  If you are at risk for HIV, ask about PrEP medicine to prevent HIV.  Get tested for HIV at least once in your life, whether you are at risk for HIV or not.  Cancer screening tests  Cervical cancer screening: If you have a cervix, begin getting regular cervical cancer screening tests starting at age 21.  Breast cancer scan (mammogram): If you've ever had breasts, begin having regular mammograms starting at age 40. This is a scan to check for breast cancer.  Colon cancer screening: It is important to start screening for colon cancer at age 45.  Have a colonoscopy test every 10 years (or  more often if you're at risk) Or, ask your provider about stool tests like a FIT test every year or Cologuard test every 3 years.  To learn more about your testing options, visit:   .  For help making a decision, visit:   https://bit.ly/ml71308.  Prostate cancer screening test: If you have a prostate, ask your care team if a prostate cancer screening test (PSA) at age 55 is right for you.  Lung cancer screening: If you are a current or former smoker ages 50 to 80, ask your care team if ongoing lung cancer screenings are right for you.  For informational purposes only. Not to replace the advice of your health care provider. Copyright   2023 [employer]. All rights reserved. Clinically reviewed by the [facility]itions [facility]. [employer] [id number] - REV 01/24.     Return in about 1 year (around 9/6/2025), or if symptoms worsen or fail to improve, for Preventative Visit.    Laura Vale MD  [facility] CLAUDIO Meraz is a 49 year old, presenting for the following:  Medication Follow-up (Diabetes. ) and Physical        9/6/2024     2:53 PM   Additional Questions   Roomed by Vivi BRAND        Health Care Directive  Patient does not have a Health Care Directive or Living Will: N/A    Healthy Habits:     Taking medications regularly:  0  History of Present Illness       Diabetes:   She presents for follow up of diabetes.  She is checking home blood glucose one time daily.   She checks blood glucose at bedtime.  Blood glucose is never over 200 and never under 70.  When her blood glucose is low, the patient is asymptomatic for confusion, blurred vision, lethargy and reports not feeling dizzy, shaky, or weak.   She has no concerns regarding her diabetes at this time.   She is not experiencing numbness or burning in feet, excessive thirst, blurry vision, weight changes or redness, sores or blisters on feet. The patient has not had a diabetic eye exam in the last 12  months.          She eats 4 or more servings of fruits and vegetables daily.She consumes 0 sweetened beverage(s) daily.She exercises with enough effort to increase her heart rate 9 or less minutes per day.  She exercises with enough effort to increase her heart rate 3 or less days per week.   She is taking medications regularly.    Medication Followup of Metformin  Taking Medication as prescribed: yes  Side Effects:  None  Medication Helping Symptoms:  yes        10/26/2023   Nutrition   Three or more servings of calcium each day? Yes   Diet: diabetic            10/26/2023   Exercise   Frequency of exercise: 2-3 days/week            10/26/2023   Social Factors   Worry food won't last until get money to buy more No   Food not last or not have enough money for food? No   Do you have housing? (Housing is defined as stable permanent housing and does not include staying ouside in a car, in a tent, in an abandoned building, in an overnight shelter, or couch-surfing.) Yes   Are you worried about losing your housing? Yes   Lack of transportation? No   Unable to get utilities (heat,electricity)? No            10/26/2023   Dental   Dentist two times every year? No             Today's PHQ-9 Score:       9/6/2024     2:37 PM   PHQ-9 SCORE   PHQ-9 Total Score MyChart 1 (Minimal depression)   PHQ-9 Total Score 1         10/26/2023   Substance Use   Alcohol more than 3/day or more than 7/wk No        Social History     Tobacco Use    Smoking status: Every Day     Current packs/day: 0.25     Average packs/day: 0.3 packs/day for 34.7 years (8.7 ttl pk-yrs)     Types: Cigarettes     Start date: 1/1/1990    Smokeless tobacco: Never    Tobacco comments:     4 Cig/day   Vaping Use    Vaping status: Never Used   Substance Use Topics    Alcohol use: Not Currently     Comment: 1 liter Vodka / day since 20 yrs age intermittently ; Last drink 8/13/22    Drug use: Never           12/23/2022   LAST FHS-7 RESULTS   1st degree relative breast or  ovarian cancer No   Any relative bilateral breast cancer No   Any male have breast cancer No   Any ONE woman have BOTH breast AND ovarian cancer No   Any woman with breast cancer before 50yrs No   2 or more relatives with breast AND/OR ovarian cancer No   2 or more relatives with breast AND/OR bowel cancer No           Mammogram Screening - Annual screen due to greater than 20% lifetime risk as estimated by Breast Cancer Risk Calculator      History of abnormal Pap smear: YES - reflected in Problem List and Health Maintenance accordingly        Latest Ref Rng & Units 10/20/2022     3:19 PM   PAP / HPV   PAP  Negative for Intraepithelial Lesion or Malignancy (NILM)    HPV 16 DNA Negative Negative    HPV 18 DNA Negative Negative    Other HR HPV Negative Positive      ASCVD Risk   The 10-year ASCVD risk score (Victor M MARTINI, et al., 2019) is: 1.7%    Values used to calculate the score:      Age: 49 years      Sex: Female      Is Non- : No      Diabetic: Yes      Tobacco smoker: Yes      Systolic Blood Pressure: 93 mmHg      Is BP treated: No      HDL Cholesterol: 82 mg/dL      Total Cholesterol: 185 mg/dL       Reviewed and updated as needed this visit by Provider   Tobacco  Allergies  Meds  Problems  Med Hx  Surg Hx  Fam Hx            History reviewed. No pertinent past medical history.  History reviewed. No pertinent surgical history.  OB History    Para Term  AB Living   1 0 0 0 1 0   SAB IAB Ectopic Multiple Live Births   0 1 0 0 0      # Outcome Date GA Lbr Luiz/2nd Weight Sex Type Anes PTL Lv   1 IAB              Lab work is in process  Labs reviewed in EPIC  BP Readings from Last 3 Encounters:   24 93/67   24 101/70   10/26/23 (!) 86/63    Wt Readings from Last 3 Encounters:   24 62.3 kg (137 lb 6.4 oz)   24 60.6 kg (133 lb 8 oz)   10/26/23 57.2 kg (126 lb)                  Patient Active Problem List   Diagnosis    Hypokalemia     Hypomagnesemia    Alcohol withdrawal syndrome without complication (H)    Hematemesis, presence of nausea not specified    Alcohol abuse, continuous    Adjustment disorder with mixed anxiety and depressed mood    Smoker    Closed fracture of lumbar vertebra (H)    Diabetes mellitus, type 2 (H)    Cervical high risk HPV (human papillomavirus) test positive    Major depression    Lumbar spondylosis    DDD (degenerative disc disease), lumbar    Osteoporosis of vertebra    Sensorineural hearing loss, bilateral    Type 2 diabetes mellitus with diabetic polyneuropathy, without long-term current use of insulin (H)     History reviewed. No pertinent surgical history.    Social History     Tobacco Use    Smoking status: Every Day     Current packs/day: 0.25     Average packs/day: 0.3 packs/day for 34.7 years (8.7 ttl pk-yrs)     Types: Cigarettes     Start date: 1/1/1990    Smokeless tobacco: Never    Tobacco comments:     4 Cig/day   Substance Use Topics    Alcohol use: Not Currently     Comment: 1 liter Vodka / day since 20 yrs age intermittently ; Last drink 8/13/22     Family History   Problem Relation Age of Onset    Eye Surgery Mother     Thyroid Disease Mother     Glaucoma Mother     Hypertension Father     Cancer Father     Diabetes No family hx of     Macular Degeneration No family hx of     Cerebrovascular Disease No family hx of     Anesthesia Reaction No family hx of     Retinal detachment No family hx of     Amblyopia No family hx of     Strabismus No family hx of          Current Outpatient Medications   Medication Sig Dispense Refill    blood glucose (CONTOUR NEXT TEST) test strip USE TO TEST THREE TIMES A  strip 0    blood glucose monitoring (SOFTCLIX) lancets Use to test blood sugar 3 times daily. 100 each 4    Blood Glucose Monitoring Suppl (CONTOUR NEXT ONE) CRICKET USE TO TEST BLOOD SUGAR TWICE A DAY OR AS DIRECTED 1 each 0    buPROPion (WELLBUTRIN XL) 150 MG 24 hr tablet Take 150 mg by mouth daily       buPROPion (WELLBUTRIN XL) 300 MG 24 hr tablet Take 300 mg by mouth every morning      Emollient (CERAVE MOISTURIZING) CREA Externally apply 1 Application topically 2 times daily To entire body 453 g 11    folic acid 5 MG CAPS       hydrOXYzine (ATARAX) 25 MG tablet       metFORMIN (GLUCOPHAGE XR) 500 MG 24 hr tablet TAKE 2 TABLETS BY MOUTH TWICE A DAY WITH MEALS 369 tablet 3    Multiple Vitamins-Minerals (MULTIVITAL PO)       polyethylene glycol (MIRALAX) 17 GM/Dose powder DISSOLVE 17 GRAMS IN 8OZ OF LIQUID AND DRINK DAILY 510 g 7    sertraline (ZOLOFT) 50 MG tablet Take 50 mg by mouth daily      topiramate (TOPAMAX) 100 MG tablet Take 150 mg by mouth At Bedtime      traZODone (DESYREL) 100 MG tablet       vitamin B-Complex Take 1 tablet by mouth daily      Vitamin D3 50 mcg (2000 units) tablet TAKE 1 TABLET BY MOUTH DAILY 90 tablet 1     Allergies   Allergen Reactions    Statins      Patient intolerant along with fatigue ongoing.  LDL remaining at goal, not concerning at this time.     Recent Labs   Lab Test 09/06/24  1546 05/02/24  1319 10/26/23  1219 02/09/23  1424 10/12/22  1028 10/12/22  1028 09/01/22  1003 08/16/22  0621 08/15/22  0752 11/11/20  1715   A1C 5.2  --  5.1 5.3   < > 12.3*  --   --   --   --    LDL  --   --  85  --   --   --  56  --   --   --    HDL  --   --  82  --   --   --  48*  --   --   --    TRIG  --   --  89  --   --   --  142  --   --   --    ALT  --   --  9  --   --   --  22 43*   < > 42   CR  --  0.81 1.02*  --   --   --  0.67 0.74   < > 0.80   GFRESTIMATED  --  90 68  --   --   --  >90 >90   < > 89   GFRESTBLACK  --   --   --   --   --   --   --   --   --  103   POTASSIUM  --   --  3.9  --   --   --  4.1 3.6   < > 4.0   TSH  --   --   --   --   --  3.18  --   --   --   --     < > = values in this interval not displayed.          Review of Systems  Constitutional, HEENT, cardiovascular, pulmonary, GI, , musculoskeletal, neuro, skin, endocrine and psych systems are negative, except as  "otherwise noted.     Objective    Exam  BP 93/67   Pulse 90   Temp 96.9  F (36.1  C) (Temporal)   Resp 14   Wt 62.3 kg (137 lb 6.4 oz)   LMP  (LMP Unknown)   SpO2 95%   BMI 22.86 kg/m     Estimated body mass index is 22.86 kg/m  as calculated from the following:    Height as of 5/2/24: 1.651 m (5' 5\").    Weight as of this encounter: 62.3 kg (137 lb 6.4 oz).    Physical Exam  GENERAL: alert and no distress  EYES: Eyes grossly normal to inspection, PERRL and conjunctivae and sclerae normal  HENT: ear canals and TM's normal, nose and mouth without ulcers or lesions  NECK: no adenopathy, no asymmetry, masses, or scars  RESP: lungs clear to auscultation - no rales, rhonchi or wheezes  BREAST: normal without masses, tenderness or nipple discharge and no palpable axillary masses or adenopathy  CV: regular rate and rhythm, normal S1 S2, no S3 or S4, no murmur, click or rub, no peripheral edema  ABDOMEN: soft, nontender, no hepatosplenomegaly, no masses and bowel sounds normal  MS: no gross musculoskeletal defects noted, no edema  SKIN: no suspicious lesions or rashes  NEURO: Normal strength and tone, mentation intact and speech normal  PSYCH: mentation appears normal, affect normal/bright        Signed Electronically by: Laura Vale MD    "

## 2024-09-06 NOTE — PATIENT INSTRUCTIONS
As discussed, please do fasting LAB only appointment    Please call your OBGYN as below and get the Colposcopy due since 2023  [facility] [phone]    Please follow up with your Psychiatry on medications as managed by them.     Will give refills for Metformin for whole year as requested.     ========================  Patient Education   Preventive Care Advice   This is general advice given by our system to help you stay healthy. However, your care team may have specific advice just for you. Please talk to your care team about your preventive care needs.  Nutrition  Eat 5 or more servings of fruits and vegetables each day.  Try wheat bread, brown rice and whole grain pasta (instead of white bread, rice, and pasta).  Get enough calcium and vitamin D. Check the label on foods and aim for 100% of the RDA (recommended daily allowance).  Lifestyle  Exercise at least 150 minutes each week  (30 minutes a day, 5 days a week).  Do muscle strengthening activities 2 days a week. These help control your weight and prevent disease.  No smoking.  Wear sunscreen to prevent skin cancer.  Have a dental exam and cleaning every 6 months.  Yearly exams  See your health care team every year to talk about:  Any changes in your health.  Any medicines your care team has prescribed.  Preventive care, family planning, and ways to prevent chronic diseases.  Shots (vaccines)   HPV shots (up to age 26), if you've never had them before.  Hepatitis B shots (up to age 59), if you've never had them before.  COVID-19 shot: Get this shot when it's due.  Flu shot: Get a flu shot every year.  Tetanus shot: Get a tetanus shot every 10 years.  Pneumococcal, hepatitis A, and RSV shots: Ask your care team if you need these based on your risk.  Shingles shot (for age 50 and up)  General health tests  Diabetes screening:  Starting at age 35, Get screened for diabetes at least every 3 years.  If you are younger than age 35,  ask your care team if you should be screened for diabetes.  Cholesterol test: At age 39, start having a cholesterol test every 5 years, or more often if advised.  Bone density scan (DEXA): At age 50, ask your care team if you should have this scan for osteoporosis (brittle bones).  Hepatitis C: Get tested at least once in your life.  STIs (sexually transmitted infections)  Before age 24: Ask your care team if you should be screened for STIs.  After age 24: Get screened for STIs if you're at risk. You are at risk for STIs (including HIV) if:  You are sexually active with more than one person.  You don't use condoms every time.  You or a partner was diagnosed with a sexually transmitted infection.  If you are at risk for HIV, ask about PrEP medicine to prevent HIV.  Get tested for HIV at least once in your life, whether you are at risk for HIV or not.  Cancer screening tests  Cervical cancer screening: If you have a cervix, begin getting regular cervical cancer screening tests starting at age 21.  Breast cancer scan (mammogram): If you've ever had breasts, begin having regular mammograms starting at age 40. This is a scan to check for breast cancer.  Colon cancer screening: It is important to start screening for colon cancer at age 45.  Have a colonoscopy test every 10 years (or more often if you're at risk) Or, ask your provider about stool tests like a FIT test every year or Cologuard test every 3 years.  To learn more about your testing options, visit:   .  For help making a decision, visit:   https://bit.ly/st28578.  Prostate cancer screening test: If you have a prostate, ask your care team if a prostate cancer screening test (PSA) at age 55 is right for you.  Lung cancer screening: If you are a current or former smoker ages 50 to 80, ask your care team if ongoing lung cancer screenings are right for you.  For informational purposes only. Not to replace the advice of your health care provider. Copyright   2023  [facility]. All rights reserved. Clinically reviewed by the [facility]. [employer] 225563 - REV 01/24.

## 2024-09-06 NOTE — Clinical Note
Yahir Aponte,  Pt has annual physical next month . She is not medicare. She has requested to change this to physical today one month before. Hope this will be covered by her insurance.   Please let me know if not covered.  Thanks & Regards Laura Vale MD on 9/6/2024 at 4:31 PM

## 2024-09-07 LAB
ALBUMIN SERPL BCG-MCNC: 4.4 G/DL (ref 3.5–5.2)
ALP SERPL-CCNC: 51 U/L (ref 40–150)
ALT SERPL W P-5'-P-CCNC: 6 U/L (ref 0–50)
ANION GAP SERPL CALCULATED.3IONS-SCNC: 11 MMOL/L (ref 7–15)
AST SERPL W P-5'-P-CCNC: 16 U/L (ref 0–45)
BILIRUB SERPL-MCNC: 0.3 MG/DL
BUN SERPL-MCNC: 21.5 MG/DL (ref 6–20)
CALCIUM SERPL-MCNC: 9.1 MG/DL (ref 8.8–10.4)
CHLORIDE SERPL-SCNC: 105 MMOL/L (ref 98–107)
CHOLEST SERPL-MCNC: 174 MG/DL
CREAT SERPL-MCNC: 0.96 MG/DL (ref 0.51–0.95)
EGFRCR SERPLBLD CKD-EPI 2021: 73 ML/MIN/1.73M2
FASTING STATUS PATIENT QL REPORTED: NO
FASTING STATUS PATIENT QL REPORTED: NO
GLUCOSE SERPL-MCNC: 110 MG/DL (ref 70–99)
HCO3 SERPL-SCNC: 23 MMOL/L (ref 22–29)
HDLC SERPL-MCNC: 76 MG/DL
LDLC SERPL CALC-MCNC: 73 MG/DL
MAGNESIUM SERPL-MCNC: 1.8 MG/DL (ref 1.7–2.3)
NONHDLC SERPL-MCNC: 98 MG/DL
POTASSIUM SERPL-SCNC: 3.9 MMOL/L (ref 3.4–5.3)
PROT SERPL-MCNC: 6.5 G/DL (ref 6.4–8.3)
SODIUM SERPL-SCNC: 139 MMOL/L (ref 135–145)
TRIGL SERPL-MCNC: 126 MG/DL
TSH SERPL DL<=0.005 MIU/L-ACNC: 1.78 UIU/ML (ref 0.3–4.2)
VIT D+METAB SERPL-MCNC: 70 NG/ML (ref 20–50)

## 2024-10-16 ENCOUNTER — PATIENT OUTREACH (OUTPATIENT)
Dept: CARE COORDINATION | Facility: CLINIC | Age: 49
End: 2024-10-16
Payer: COMMERCIAL

## 2024-11-04 ENCOUNTER — NURSE TRIAGE (OUTPATIENT)
Dept: FAMILY MEDICINE | Facility: CLINIC | Age: 49
End: 2024-11-04
Payer: COMMERCIAL

## 2024-11-04 NOTE — TELEPHONE ENCOUNTER
Patient Contact    Attempt # 1    Was call answered?  No. Unable to leave a message     Gina WILKES, Triage RN  [facility]       Mariya CHAVEZ [facility] (supporting Laura Vale MD)3 days ago     SW  hello,  I am experiencing symptoms for an UTI.  I have urge to void and only go a little.  When I do urinate, it burns.  I've had these before.  Do I need an appointment or can I just do a urine sample?

## 2024-11-05 NOTE — TELEPHONE ENCOUNTER
Pt also was sent  reply as Attempt #2 on 11/4/24.    Spoke to pt. She states her symptoms have resolved but will continue to monitor. Pt understands to submit evisit if symptoms come back.       Ying Ma RN

## 2024-11-11 DIAGNOSIS — K59.00 CONSTIPATION, UNSPECIFIED CONSTIPATION TYPE: ICD-10-CM

## 2024-11-11 RX ORDER — POLYETHYLENE GLYCOL 3350 17 G/17G
POWDER, FOR SOLUTION ORAL
Qty: 510 G | Refills: 3 | Status: SHIPPED | OUTPATIENT
Start: 2024-11-11

## 2024-11-11 NOTE — TELEPHONE ENCOUNTER
Prescription approved per [facility] Refill Protocol.  Kesha Almaguer RN  [facility]

## 2024-11-13 ENCOUNTER — PATIENT OUTREACH (OUTPATIENT)
Dept: CARE COORDINATION | Facility: CLINIC | Age: 49
End: 2024-11-13
Payer: COMMERCIAL

## 2024-11-18 ENCOUNTER — ANCILLARY PROCEDURE (OUTPATIENT)
Dept: BONE DENSITY | Facility: CLINIC | Age: 49
End: 2024-11-18
Payer: COMMERCIAL

## 2024-11-18 DIAGNOSIS — M81.0 OSTEOPOROSIS OF VERTEBRA: ICD-10-CM

## 2024-11-18 DIAGNOSIS — S32.009D CLOSED FRACTURE OF LUMBAR VERTEBRA WITH ROUTINE HEALING, UNSPECIFIED FRACTURE MORPHOLOGY, UNSPECIFIED LUMBAR VERTEBRAL LEVEL, SUBSEQUENT ENCOUNTER: ICD-10-CM

## 2024-12-04 ENCOUNTER — OFFICE VISIT (OUTPATIENT)
Dept: ENDOCRINOLOGY | Facility: CLINIC | Age: 49
End: 2024-12-04
Payer: COMMERCIAL

## 2024-12-04 VITALS
OXYGEN SATURATION: 99 % | SYSTOLIC BLOOD PRESSURE: 98 MMHG | TEMPERATURE: 98 F | BODY MASS INDEX: 22.33 KG/M2 | HEART RATE: 81 BPM | RESPIRATION RATE: 16 BRPM | WEIGHT: 134 LBS | DIASTOLIC BLOOD PRESSURE: 67 MMHG | HEIGHT: 65 IN

## 2024-12-04 DIAGNOSIS — S32.009D CLOSED FRACTURE OF LUMBAR VERTEBRA WITH ROUTINE HEALING, UNSPECIFIED FRACTURE MORPHOLOGY, UNSPECIFIED LUMBAR VERTEBRAL LEVEL, SUBSEQUENT ENCOUNTER: ICD-10-CM

## 2024-12-04 DIAGNOSIS — M85.80 OSTEOPENIA, UNSPECIFIED LOCATION: Primary | ICD-10-CM

## 2024-12-04 PROCEDURE — G2211 COMPLEX E/M VISIT ADD ON: HCPCS | Performed by: INTERNAL MEDICINE

## 2024-12-04 PROCEDURE — 99214 OFFICE O/P EST MOD 30 MIN: CPT | Performed by: INTERNAL MEDICINE

## 2024-12-04 RX ORDER — ALENDRONATE SODIUM 70 MG/1
70 TABLET ORAL
Qty: 12 TABLET | Refills: 3 | Status: SHIPPED | OUTPATIENT
Start: 2024-12-04

## 2024-12-04 NOTE — LETTER
12/4/2024      Mariya Núñez  [address]  [place] [facility] [address]      Dear Colleague,    Thank you for referring your patient, Mariya Núñez, to the [facility]. Please see a copy of my visit note below.    Name: Mariya Núñez  Seen for follow up of osteoporosis.     HPI:  Mariya Núñez is a 49 year old female who presents for the evaluation of osteoporosis.   has no past medical history on file.    DEXA 11/2022: Severe osteoporosis on the basis of multiple vertebral fractures. (Based on CT scan)    DEXA 11/2024: Low bone density (OSTEOPENIA).   There has been a 5.9% decrease in lumbar spine BMD. This indicates significant change based on 95% confidence interval.  -There has been a 6.5% decrease in bilateral hip BMD. This indicates significant change based on 95% confidence interval.    RISK FACTORS:  Early menopause, age <45, Current tobacco use, Fracture of elbow, multiple nontraumatic vertebral fractures   CT 9/2022 showing--Subacute fractures involving the L3, L4 and L5 vertebral bodies.   But MRI 10/2022--No compression fractures.   5/2024 X-ray spine: Chronic appearing superior endplate compression deformity of the L3 vertebral body. Additional questionable superior endplate compression deformity at T11, which appears chronic as well.    She does not recommend any major trauma. She reports that she was alcoholic and does not recall any major trauma.  She is sober for 2 years now.    She was started on fosamax-- but after that noted RENETTA and was stopped .  It was unclear if RENETTA was secondary to dehydration or Fosamax but it was decided not to start Fosamax after that.  She took it for 6 months.    She was on depakot for 10 years as mood stabilizer. ( In her 20s. Now off for several years).    GERD: no    Dental health: OK. No major upcoming dental procedure.  Has regular follow-up with dentistry. May need a bridge.    Kidney function: History of RENETTA.  Last creatinine  check 10/2022 showing slightly high creatinine.    Previous treatment for osteopenia/osteoporosis: Fosamax    Current treatment for Osteopenia/Osteoporosis: none    Smoke:Yes: current smoker  Family History:Yes: aunt with osteoporosis  Menstrual history/Birthing: s/p menopause  HRT after menopause: NO  Fractures:Yes: elbow fracture after falling from bunk bed in college. Collar bone fracture.  Kidney stones: No  GI Surgery:No  Duration of therapy:  as noted above  Exercise:not much  Diet:  1-2 servings of dairy/day  Ca/Vitamin D: multivitamin and vit B complex. Takes 2000 international unit(s)/day  Alcohol:  Yes: former alcoholic  Eating Disorder: Yes: h/o bullemia and anorexia in adolescent years. Was hospitalized few times for that. She is better now.  Steroid Use:  No  PMH/PSH:  History reviewed. No pertinent past medical history.  History reviewed. No pertinent surgical history.  Family Hx:  Family History   Problem Relation Age of Onset     Eye Surgery Mother      Thyroid Disease Mother      Glaucoma Mother      Hypertension Father      Cancer Father      Diabetes No family hx of      Macular Degeneration No family hx of      Cerebrovascular Disease No family hx of      Anesthesia Reaction No family hx of      Retinal detachment No family hx of      Amblyopia No family hx of      Strabismus No family hx of               Social Hx:  Social History     Socioeconomic History     Marital status: Single     Spouse name: Not on file     Number of children: Not on file     Years of education: Not on file     Highest education level: Not on file   Occupational History     Not on file   Tobacco Use     Smoking status: Every Day     Current packs/day: 0.25     Average packs/day: 0.3 packs/day for 34.9 years (8.7 ttl pk-yrs)     Types: Cigarettes     Start date: 1/1/1990     Smokeless tobacco: Never     Tobacco comments:     4 Cig/day   Vaping Use     Vaping status: Never Used   Substance and Sexual Activity     Alcohol  use: Not Currently     Comment: 1 liter Vodka / day since 20 yrs age intermittently ; Last drink 8/13/22     Drug use: Never     Sexual activity: Not Currently   Other Topics Concern     Not on file   Social History Narrative     Not on file     Social Drivers of Health     Financial Resource Strain: Low Risk  (10/26/2023)    Financial Resource Strain      Within the past 12 months, have you or your family members you live with been unable to get utilities (heat, electricity) when it was really needed?: No   Food Insecurity: Low Risk  (10/26/2023)    Food Insecurity      Within the past 12 months, did you worry that your food would run out before you got money to buy more?: No      Within the past 12 months, did the food you bought just not last and you didn t have money to get more?: No   Transportation Needs: Low Risk  (10/26/2023)    Transportation Needs      Within the past 12 months, has lack of transportation kept you from medical appointments, getting your medicines, non-medical meetings or appointments, work, or from getting things that you need?: No   Physical Activity: Not on file   Stress: Not on file   Social Connections: Not on file   Interpersonal Safety: Low Risk  (10/26/2023)    Interpersonal Safety      Do you feel physically and emotionally safe where you currently live?: Yes      Within the past 12 months, have you been hit, slapped, kicked or otherwise physically hurt by someone?: No      Within the past 12 months, have you been humiliated or emotionally abused in other ways by your partner or ex-partner?: No   Housing Stability: High Risk (10/26/2023)    Housing Stability      Do you have housing? : Yes      Are you worried about losing your housing?: Yes          MEDICATIONS:  has a current medication list which includes the following prescription(s): contour next test, blood glucose monitoring, contour next one, bupropion, bupropion, cerave moisturizing, hydroxyzine hcl, metformin,  "polyethylene glycol, sertraline, topiramate, trazodone, and vitamin b-complex.    ROS     ROS: 10 point ROS neg other than the symptoms noted above in the HPI.    Physical Exam   VS: BP 98/67 (BP Location: Left arm, Patient Position: Chair, Cuff Size: Adult Regular)   Pulse 81   Temp 98  F (36.7  C) (Tympanic)   Resp 16   Ht 1.651 m (5' 5\")   Wt 60.8 kg (134 lb)   LMP  (LMP Unknown)   SpO2 99%   Breastfeeding No   BMI 22.30 kg/m    GENERAL: healthy, alert and no distress  EYES: Eyes grossly normal to inspection, conjunctivae and sclerae normal  ENT: no nose swelling, nasal discharge.  Thyroid: no apparent thyroid nodules.    CV: RRR, no rubs, gallops, no murmurs  RESP: CTAB, no wheezes, rales, or ronchi  ABDO: +BS  EXTREMITIES: no hand tremors.  NEURO: Cranial nerves grossly intact, mentation intact and speech normal  SPINE: Midline, no TTP.  SKIN: No apparent skin lesions, rash or edema seen   PSYCH: mentation appears normal, affect normal/bright, judgement and insight intact, normal speech and appearance well-groomed    LABS:  Calcium:   Latest Ref Rng 10/26/2023  12:19 PM   ENDO CALCIUM LABS-[facility]     Calcium 8.6 - 10.0 mg/dL 9.2        Creatinine:  Creatinine   Date Value Ref Range Status   09/06/2024 0.96 (H) 0.51 - 0.95 mg/dL Final   11/11/2020 0.80 0.52 - 1.04 mg/dL Final       TFTs:  Lab Results   Component Value Date    TSH 3.18 10/12/2022       PTH:    Vitamin D:  Vitamin D Deficiency Screening Results:  Lab Results   Component Value Date    VITDT 70 (H) 09/06/2024    VITDT 88 (H) 05/02/2024    VITDT 94 (H) 10/26/2023    VITDT 32 10/12/2022         DEXA:  DEXA 11/2022: Severe osteoporosis on the basis of multiple vertebral fractures. (Based on CT scan)    DEXA 11/2024:  Low bone density (OSTEOPENIA).   There has been a 5.9% decrease in lumbar spine BMD. This indicates significant change based on 95% confidence interval.  -There has been a 6.5% decrease in bilateral hip BMD. This indicates " significant change based on 95% confidence interval.    All pertinent notes, labs, and images personally reviewed by me.     A/P  Ms.Sarah JAGJIT Núñez is a 49 year old here for the evaluation of:    Osteoporosis:  DEXA 11/2022: Severe osteoporosis on the basis of multiple vertebral fractures. (Based on CT scan)  But MRI 2023 did not identify any vertebral fractures.  Xray 5/2024 showed Chronic appearing superior endplate compression deformity of the L3 vertebral body. Additional questionable superior endplate compression deformity at T11, which appears chronic as well.  She was on Fosamax for 6 months but had RENETTA?  Unclear if it was Fosamax related or dehydration.  Other risk factor include current smoking, history of anorexia.  Plan:  Discussed diagnosis, pathophysiology, management and treatment options of condition with pt.  Retrial of Fosamax .   Start Fosamax 70 mg once a week.  Labs in 1-2 months. ( Check creatinine in the setting of h/o RENETTA as noted above)  Is stable, Follow up in 1 year.    Risk factors for low bone density include personal history of fracture or family history, , advanced age, female, dementia, and poor health.  Also smoking, low BMI, Estrogen deficiency, low Ca intake, and alcoholism.  A prior history of vertebral fracture greatly increases the risk of subsequent fractures. A history of other medical diseases impacting on bone may also affect bone health.      The pt was advised to  Maintain an adequate calcium and vitamin D intake and to supplement vitamin D if needed to maintain serum levels of 25 hydroxy D (25 OH D) between 30-60 ng/ml.  Limit alcohol intake to no more than 2 servings per day.  Limit caffeine intake.  Maintain an active lifestyle including weight-bearing exercises for at least 30 mins daily.  Take measures to reduce the risk of falling.   Discussed indications, risks and benefits of all medications prescribed, and answered questions to patient's satisfaction.    The longitudinal plan of care for the diagnosis(es)/condition(s) as documented were addressed during this visit. Due to the added complexity in care, I will continue to support Mariya in the subsequent management and with ongoing continuity of care.  All questions were answered.  The patient indicates understanding of the above issues and agrees with the plan set forth.    Follow-up:  As noted in AVS    Katelynn Leonardo MD  Endocrinology  [facility]  CC: [facility], [place] [facility]      Again, thank you for allowing me to participate in the care of your patient.        Sincerely,        Katelynn Leonardo MD

## 2024-12-04 NOTE — PATIENT INSTRUCTIONS
[facility]  Dr Leonardo, Endocrinology Department    [place] [address] [id number]  [place] [address]  Appointment [phone]  Fax: [phone]  [place]: Monday - Thursday      Please check the cost coverage and copay with insurance before recommended tests, services and medications (especially if new medications are prescribed).     If ordered, please get blood work done 1 week prior to your next appointment so they will be available to Dr. Leonardo at your visit.    Start Fosamax 70 mg once a week.  Labs in 1-2 months.  Follow up in 1 year.    Instructions on Fosamax use and side effects - particularly esophageal adverse events - are carefully reviewed with her. This drug must be taken upon arising for the day on an empty stomach, with a large 6-8 ounce glass of water; she must remain NPO in the upright position for at least 30 minutes afterwards and until after the first food of the day. If esophageal irritation is noted, she will stop the drug and call my office.  Treatment with bisphosphonate therapy will decrease fracture risk 50-70%.   There is risk of osteonecrosis of the jaw in patients using bisphosphonates is approximately 1700-1/100,000, with development most likely related to invasive dental procedures. If an invasive dental procedure was necessary, preferably stop the bisphosphonate approximately 3 months prior to reduce the risk. Let your dentist know that you are on this medication.  The data available at this time suggests that there is probably a small increase risk of atypical (nontraumatic) subtrochanteric fractures of the femur in patients on bisphosphonate therapy compared to those not on it. One large study suggested that for every 100 fractures prevented with bisphosphonate therapy, less than one femur fracture will occur. Other studies suggest one episode per 2,500 patient years. Patient should call with leg pain.      The  pt was advised to  Maintain an adequate calcium and vitamin D intake and to supplement vitamin D if needed to maintain serum levels of 25 hydroxy D (25 OH D) between 30-60 ng/ml.  Limit alcohol intake to no more than 2 servings per day.  Limit caffeine intake.  Maintain an active lifestyle including weight-bearing exercises for at least 30 mins daily.  Take measures to reduce the risk of falling.     You should get 1000- 1200 mg/day calcium in divided doses of no more than 500 mg/dose.  This INCLUDES what is in your food as well as what is in any supplements you may be taking.    Dietary sources of calcium:: These also contain vitamin D  Milk                            8 oz            300 mg calcium  Yogurt                          1 cup           400 mg calcium   Hard cheese                     1.5 oz          300 mg  Cottage cheese                  2 cup           300 mg  Orange juice with Calcium       8 oz            300 mg  Low fat dairy sources are recommended        You should get 30 minutes of moderate weight bearing exercise on most days of the week .  Weight bearing exercise includes such things as walking, jogging, hiking, dancing.  You should also get Strength training 2 or more times/week in addition to other weight -being exercise. Strength training uses weight or resistance beyond that seen in everyday activities -(pilates, weight training with free weights, weight machines or resistance bands)     Living with Osteoporosis: Preventing Fractures  If you have osteoporosis, you can do a lot to reduce its effect on your life. Knowing how to prevent fractures and spinal curvature can help you live more comfortably and safely with this disease.  Reducing your risk for fractures  The most common fracture sites in people with osteoporosis are the wrist, spine, and hip. These fractures are often caused by accidents and falls. All fractures are painful and may limit what you can do. But hip fractures are very  serious. They often need surgery, and it can take months to recover. To reduce your risk for fractures:  Get regular exercise. Try walking, swimming, or weight training.  Eat foods that are rich in calcium, or take calcium supplements.  Make your home safe to help avoid accidents.  Take extra precautions not to fall in risky areas, such as icy sidewalks.  Understanding spinal fractures  Your spine is made up of many bones called vertebrae. Osteoporosis can cause the vertebrae in your spine to collapse. As a result, your upper back may arch forward, creating a curvature. Spine fractures may also result from back strain and bad posture. You will also lose height. Your lower spine must then adjust to keep your body balanced. This can cause back pain. To prevent or lessen these spinal changes:  Practice good posture.  Use proper techniques if you need to lift heavy objects.  Do back exercises to help your posture.  Lie on your back when you have pain.  Ask your healthcare provider about these and other ways to help your spine.  [employer] last reviewed this educational content on 5/1/2018 2000-2021 [employer]. All rights reserved. This information is not intended as a substitute for professional medical care. Always follow your healthcare professional's instructions.          Living with Osteoporosis: Regular Exercise  If you have osteoporosis, exercise is vital for your health. It can prevent bone fractures and spine changes. It will slow bone loss. Exercise will strengthen your body. It can also be fun. A variety of exercises is best. See below for exercises that can help you. But before you start, talk with your healthcare provider to be sure these exercises are right for you.   Resistance exercises. These build muscle strength and maintain bone mass. They also make you less prone to injury. Exercises include lifting small weights, doing push-ups and sit-ups, using elastic exercise bands, and using  weight machines.   Weight-bearing activities. These help your whole body. They also help you maintain bone mass. Activities include walking, dancing, and housework.   Non-weight-bearing exercises. These help prevent back strain and pain. They do this by building the trunk and leg muscles. Exercises that help with flexibility can prevent falls. Examples include swimming, water exercise, and stretching.   Staying safe  Here are tips to stay safe:   Always check with your healthcare provider before starting any new exercise program.  Use weights only as instructed.  Stop any exercise that causes pain.  [employer] last reviewed this educational content on 5/1/2018 2000-2021 [employer]. All rights reserved. This information is not intended as a substitute for professional medical care. Always follow your healthcare professional's instructions.          Preventing Osteoporosis: Avoiding Bone Loss  Certain factors can speed up bone loss or decrease bone growth. For example, alcohol, cigarettes, and certain medicines reduce bone mass. Some foods make it hard for your body to absorb calcium.  Things to avoid  Here are things to avoid to help prevent osteoporosis:  Alcohol. This is toxic to bones. It is a major cause of bone loss. Heavy drinking can cause osteoporosis even if you have no other risk factors.  Smoking. This reduces bone mass. Smoking may also interfere with estrogen levels and cause early menopause.  Inactivity. Not being active makes your bones lose strength and become thinner. Over time, thin bones may break. Women who aren't active are at a high risk for osteoporosis.  Certain medicines. Some medicines, such as cortisone, increase bone loss. They also decrease bone growth. Ask your healthcare provider about any side effects of your medicines, and how to prevent them.  Protein-rich or salty foods. Eaten in large amounts, these foods may deplete calcium.  Caffeine. This increases calcium loss.  People who drink a lot of coffee, tea, or soda lose more calcium than those who don't.  [employer] last reviewed this educational content on 2018-2021 [employer]. All rights reserved. This information is not intended as a substitute for professional medical care. Always follow your healthcare professional's instructions.          Preventing Osteoporosis: Meeting Your Calcium Needs  Your body needs calcium to build and repair bones. But it can't make calcium on its own. That's why it's important to eat calcium-rich foods. Some foods are naturally rich in calcium. Others have calcium added (fortified). It's best to get calcium from the foods you eat. But if you can't get enough, you may want to take calcium supplements. To meet your daily calcium needs, try the foods listed below.                          Dairy Fish & beans Other sources   Source   Calcium (mg) per serving   Source   Calcium (mg) per serving   Source   Calcium (mg) per serving   Low-fat yogurt, plain   415 mg/8 oz.   Sardines, Atlantic, canned, with bones   351 mg/3 oz.   Oatmeal, instant, fortified   215 mg/1 cup   Nonfat milk   302 mg/1 cup   [place], sockeye, canned, with bones   239 mg/3 oz.   Tofu made with calcium sulfate   204 mg/3 oz.   Low-fat milk   297 mg/1 cup   Soybeans, fresh, boiled   131 mg/1/2 cup   Collards   179 mg/1/2 cup   Swiss cheese   272 mg/1 oz.   White beans, cooked   81 mg/1/2 cup   English muffin, whole wheat   175 mg/1 muffin   Cheddar cheese   205 mg/1 oz.   Navy beans, cooked   79 mg/1/2 cup   Kale   90 mg/1/2 cup   Ice cream strawberry   79 mg/1/2 cup           Orange, navel   56 mg/1 medium   Note: Calcium levels may vary depending on brand and size.  Daily calcium needs  14 to 18 years old: 1,300 mg  19 to 30 years old: 1,000 mg  31 to 50 years old: 1,000 mg  51 to 70 years old, women: 1,200 mg  51 to 70 years old, men: 1,000 mg  Pregnant or nursin to 18 years old: 1,300 mg, 19 to 50  years old: 1,000 mg  Older than 70 (women and men): 1,200 mg   [employer] last reviewed this educational content on 5/1/2018 2000-2021 [employer]. All rights reserved. This information is not intended as a substitute for professional medical care. Always follow your healthcare professional's instructions.

## 2024-12-04 NOTE — PROGRESS NOTES
Name: Mariya Núñez  Seen for follow up of osteoporosis.     HPI:  Mariya Núñez is a 49 year old female who presents for the evaluation of osteoporosis.   has no past medical history on file.    DEXA 11/2022: Severe osteoporosis on the basis of multiple vertebral fractures. (Based on CT scan)    DEXA 11/2024: Low bone density (OSTEOPENIA).   There has been a 5.9% decrease in lumbar spine BMD. This indicates significant change based on 95% confidence interval.  -There has been a 6.5% decrease in bilateral hip BMD. This indicates significant change based on 95% confidence interval.    RISK FACTORS:  Early menopause, age <45, Current tobacco use, Fracture of elbow, multiple nontraumatic vertebral fractures   CT 9/2022 showing--Subacute fractures involving the L3, L4 and L5 vertebral bodies.   But MRI 10/2022--No compression fractures.   5/2024 X-ray spine: Chronic appearing superior endplate compression deformity of the L3 vertebral body. Additional questionable superior endplate compression deformity at T11, which appears chronic as well.    She does not recommend any major trauma. She reports that she was alcoholic and does not recall any major trauma.  She is sober for 2 years now.    She was started on fosamax-- but after that noted RENETTA and was stopped .  It was unclear if RENETTA was secondary to dehydration or Fosamax but it was decided not to start Fosamax after that.  She took it for 6 months.    She was on depakot for 10 years as mood stabilizer. ( In her 20s. Now off for several years).    GERD: no    Dental health: OK. No major upcoming dental procedure.  Has regular follow-up with dentistry. May need a bridge.    Kidney function: History of RENETTA.  Last creatinine check 10/2022 showing slightly high creatinine.    Previous treatment for osteopenia/osteoporosis: Fosamax    Current treatment for Osteopenia/Osteoporosis: none    Smoke:Yes: current smoker  Family History:Yes: aunt with osteoporosis  Menstrual  history/Birthing: s/p menopause  HRT after menopause: NO  Fractures:Yes: elbow fracture after falling from bunk bed in college. Collar bone fracture.  Kidney stones: No  GI Surgery:No  Duration of therapy:  as noted above  Exercise:not much  Diet:  1-2 servings of dairy/day  Ca/Vitamin D: multivitamin and vit B complex. Takes 2000 international unit(s)/day  Alcohol:  Yes: former alcoholic  Eating Disorder: Yes: h/o bullemia and anorexia in adolescent years. Was hospitalized few times for that. She is better now.  Steroid Use:  No  PMH/PSH:  History reviewed. No pertinent past medical history.  History reviewed. No pertinent surgical history.  Family Hx:  Family History   Problem Relation Age of Onset    Eye Surgery Mother     Thyroid Disease Mother     Glaucoma Mother     Hypertension Father     Cancer Father     Diabetes No family hx of     Macular Degeneration No family hx of     Cerebrovascular Disease No family hx of     Anesthesia Reaction No family hx of     Retinal detachment No family hx of     Amblyopia No family hx of     Strabismus No family hx of               Social Hx:  Social History     Socioeconomic History    Marital status: Single     Spouse name: Not on file    Number of children: Not on file    Years of education: Not on file    Highest education level: Not on file   Occupational History    Not on file   Tobacco Use    Smoking status: Every Day     Current packs/day: 0.25     Average packs/day: 0.3 packs/day for 34.9 years (8.7 ttl pk-yrs)     Types: Cigarettes     Start date: 1/1/1990    Smokeless tobacco: Never    Tobacco comments:     4 Cig/day   Vaping Use    Vaping status: Never Used   Substance and Sexual Activity    Alcohol use: Not Currently     Comment: 1 liter Vodka / day since 20 yrs age intermittently ; Last drink 8/13/22    Drug use: Never    Sexual activity: Not Currently   Other Topics Concern    Not on file   Social History Narrative    Not on file     Social Drivers of Health      Financial Resource Strain: Low Risk  (10/26/2023)    Financial Resource Strain     Within the past 12 months, have you or your family members you live with been unable to get utilities (heat, electricity) when it was really needed?: No   Food Insecurity: Low Risk  (10/26/2023)    Food Insecurity     Within the past 12 months, did you worry that your food would run out before you got money to buy more?: No     Within the past 12 months, did the food you bought just not last and you didn t have money to get more?: No   Transportation Needs: Low Risk  (10/26/2023)    Transportation Needs     Within the past 12 months, has lack of transportation kept you from medical appointments, getting your medicines, non-medical meetings or appointments, work, or from getting things that you need?: No   Physical Activity: Not on file   Stress: Not on file   Social Connections: Not on file   Interpersonal Safety: Low Risk  (10/26/2023)    Interpersonal Safety     Do you feel physically and emotionally safe where you currently live?: Yes     Within the past 12 months, have you been hit, slapped, kicked or otherwise physically hurt by someone?: No     Within the past 12 months, have you been humiliated or emotionally abused in other ways by your partner or ex-partner?: No   Housing Stability: High Risk (10/26/2023)    Housing Stability     Do you have housing? : Yes     Are you worried about losing your housing?: Yes          MEDICATIONS:  has a current medication list which includes the following prescription(s): contour next test, blood glucose monitoring, contour next one, bupropion, bupropion, cerave moisturizing, hydroxyzine hcl, metformin, polyethylene glycol, sertraline, topiramate, trazodone, and vitamin b-complex.    ROS     ROS: 10 point ROS neg other than the symptoms noted above in the HPI.    Physical Exam   VS: BP 98/67 (BP Location: Left arm, Patient Position: Chair, Cuff Size: Adult Regular)   Pulse 81   Temp 98  " F (36.7  C) (Tympanic)   Resp 16   Ht 1.651 m (5' 5\")   Wt 60.8 kg (134 lb)   LMP  (LMP Unknown)   SpO2 99%   Breastfeeding No   BMI 22.30 kg/m    GENERAL: healthy, alert and no distress  EYES: Eyes grossly normal to inspection, conjunctivae and sclerae normal  ENT: no nose swelling, nasal discharge.  Thyroid: no apparent thyroid nodules.    CV: RRR, no rubs, gallops, no murmurs  RESP: CTAB, no wheezes, rales, or ronchi  ABDO: +BS  EXTREMITIES: no hand tremors.  NEURO: Cranial nerves grossly intact, mentation intact and speech normal  SPINE: Midline, no TTP.  SKIN: No apparent skin lesions, rash or edema seen   PSYCH: mentation appears normal, affect normal/bright, judgement and insight intact, normal speech and appearance well-groomed    LABS:  Calcium:   Latest Ref Rng 10/26/2023  12:19 PM   ENDO CALCIUM LABS-[facility]     Calcium 8.6 - 10.0 mg/dL 9.2        Creatinine:  Creatinine   Date Value Ref Range Status   09/06/2024 0.96 (H) 0.51 - 0.95 mg/dL Final   11/11/2020 0.80 0.52 - 1.04 mg/dL Final       TFTs:  Lab Results   Component Value Date    TSH 3.18 10/12/2022       PTH:    Vitamin D:  Vitamin D Deficiency Screening Results:  Lab Results   Component Value Date    VITDT 70 (H) 09/06/2024    VITDT 88 (H) 05/02/2024    VITDT 94 (H) 10/26/2023    VITDT 32 10/12/2022         DEXA:  DEXA 11/2022: Severe osteoporosis on the basis of multiple vertebral fractures. (Based on CT scan)    DEXA 11/2024:  Low bone density (OSTEOPENIA).   There has been a 5.9% decrease in lumbar spine BMD. This indicates significant change based on 95% confidence interval.  -There has been a 6.5% decrease in bilateral hip BMD. This indicates significant change based on 95% confidence interval.    All pertinent notes, labs, and images personally reviewed by me.     A/P  Ms.Sarah JAGJIT Núñez is a 49 year old here for the evaluation of:    Osteoporosis:  DEXA 11/2022: Severe osteoporosis on the basis of multiple vertebral fractures. (Based " on CT scan)  But MRI 2023 did not identify any vertebral fractures.  Xray 5/2024 showed Chronic appearing superior endplate compression deformity of the L3 vertebral body. Additional questionable superior endplate compression deformity at T11, which appears chronic as well.  She was on Fosamax for 6 months but had RENETTA?  Unclear if it was Fosamax related or dehydration.  Other risk factor include current smoking, history of anorexia.  Plan:  Discussed diagnosis, pathophysiology, management and treatment options of condition with pt.  Retrial of Fosamax .   Start Fosamax 70 mg once a week.  Labs in 1-2 months. ( Check creatinine in the setting of h/o RENETTA as noted above)  Is stable, Follow up in 1 year.    Risk factors for low bone density include personal history of fracture or family history, , advanced age, female, dementia, and poor health.  Also smoking, low BMI, Estrogen deficiency, low Ca intake, and alcoholism.  A prior history of vertebral fracture greatly increases the risk of subsequent fractures. A history of other medical diseases impacting on bone may also affect bone health.      The pt was advised to  Maintain an adequate calcium and vitamin D intake and to supplement vitamin D if needed to maintain serum levels of 25 hydroxy D (25 OH D) between 30-60 ng/ml.  Limit alcohol intake to no more than 2 servings per day.  Limit caffeine intake.  Maintain an active lifestyle including weight-bearing exercises for at least 30 mins daily.  Take measures to reduce the risk of falling.   Discussed indications, risks and benefits of all medications prescribed, and answered questions to patient's satisfaction.   The longitudinal plan of care for the diagnosis(es)/condition(s) as documented were addressed during this visit. Due to the added complexity in care, I will continue to support Mariya in the subsequent management and with ongoing continuity of care.  All questions were answered.  The patient  indicates understanding of the above issues and agrees with the plan set forth.    Follow-up:  As noted in AVS    Katelynn Leonardo MD  Endocrinology  [facility]  CC: Clinic, [place] [facility]

## 2025-01-08 ENCOUNTER — LAB (OUTPATIENT)
Dept: LAB | Facility: CLINIC | Age: 50
End: 2025-01-08
Payer: COMMERCIAL

## 2025-01-08 DIAGNOSIS — M85.80 OSTEOPENIA, UNSPECIFIED LOCATION: ICD-10-CM

## 2025-01-08 LAB
CREAT SERPL-MCNC: 0.83 MG/DL (ref 0.51–0.95)
EGFRCR SERPLBLD CKD-EPI 2021: 86 ML/MIN/1.73M2
VIT D+METAB SERPL-MCNC: 59 NG/ML (ref 20–50)

## 2025-01-08 PROCEDURE — 36415 COLL VENOUS BLD VENIPUNCTURE: CPT

## 2025-01-08 PROCEDURE — 82306 VITAMIN D 25 HYDROXY: CPT

## 2025-01-08 PROCEDURE — 82565 ASSAY OF CREATININE: CPT

## 2025-02-02 ENCOUNTER — HEALTH MAINTENANCE LETTER (OUTPATIENT)
Age: 50
End: 2025-02-02

## 2025-03-26 ENCOUNTER — HOSPITAL ENCOUNTER (EMERGENCY)
Facility: CLINIC | Age: 50
Discharge: HOME | End: 2025-03-26
Attending: EMERGENCY MEDICINE | Admitting: EMERGENCY MEDICINE
Payer: COMMERCIAL

## 2025-03-26 ENCOUNTER — OFFICE VISIT (OUTPATIENT)
Dept: URGENT CARE | Facility: URGENT CARE | Age: 50
End: 2025-03-26
Payer: COMMERCIAL

## 2025-03-26 VITALS
HEIGHT: 66 IN | RESPIRATION RATE: 18 BRPM | WEIGHT: 132 LBS | OXYGEN SATURATION: 98 % | SYSTOLIC BLOOD PRESSURE: 107 MMHG | HEART RATE: 109 BPM | BODY MASS INDEX: 21.21 KG/M2 | DIASTOLIC BLOOD PRESSURE: 73 MMHG | TEMPERATURE: 97.5 F

## 2025-03-26 VITALS
HEART RATE: 84 BPM | WEIGHT: 132 LBS | BODY MASS INDEX: 21.97 KG/M2 | SYSTOLIC BLOOD PRESSURE: 98 MMHG | OXYGEN SATURATION: 97 % | TEMPERATURE: 103 F | RESPIRATION RATE: 20 BRPM | DIASTOLIC BLOOD PRESSURE: 62 MMHG

## 2025-03-26 DIAGNOSIS — B34.9 VIRAL SYNDROME: ICD-10-CM

## 2025-03-26 DIAGNOSIS — E86.0 DEHYDRATION: ICD-10-CM

## 2025-03-26 DIAGNOSIS — R42 DIZZINESS: ICD-10-CM

## 2025-03-26 DIAGNOSIS — R50.9 FEVER IN ADULT: Primary | ICD-10-CM

## 2025-03-26 LAB
ALBUMIN SERPL BCG-MCNC: 3.7 G/DL (ref 3.5–5.2)
ALBUMIN UR-MCNC: NEGATIVE MG/DL
ALP SERPL-CCNC: 52 U/L (ref 40–150)
ALT SERPL W P-5'-P-CCNC: <5 U/L (ref 0–50)
ANION GAP SERPL CALCULATED.3IONS-SCNC: 12 MMOL/L (ref 7–15)
ANION GAP SERPL CALCULATED.3IONS-SCNC: 16 MMOL/L (ref 7–15)
APPEARANCE UR: CLEAR
AST SERPL W P-5'-P-CCNC: 14 U/L (ref 0–45)
BASOPHILS # BLD AUTO: 0 10E3/UL (ref 0–0.2)
BASOPHILS NFR BLD AUTO: 0 %
BILIRUB SERPL-MCNC: 0.4 MG/DL
BILIRUB UR QL STRIP: NEGATIVE
BUN SERPL-MCNC: 12.7 MG/DL (ref 6–20)
BUN SERPL-MCNC: 15.8 MG/DL (ref 6–20)
CALCIUM SERPL-MCNC: 8.3 MG/DL (ref 8.8–10.4)
CALCIUM SERPL-MCNC: 8.5 MG/DL (ref 8.8–10.4)
CHLORIDE SERPL-SCNC: 100 MMOL/L (ref 98–107)
CHLORIDE SERPL-SCNC: 96 MMOL/L (ref 98–107)
COLOR UR AUTO: ABNORMAL
CREAT SERPL-MCNC: 0.66 MG/DL (ref 0.51–0.95)
CREAT SERPL-MCNC: 0.73 MG/DL (ref 0.51–0.95)
EGFRCR SERPLBLD CKD-EPI 2021: >90 ML/MIN/1.73M2
EGFRCR SERPLBLD CKD-EPI 2021: >90 ML/MIN/1.73M2
EOSINOPHIL # BLD AUTO: 0 10E3/UL (ref 0–0.7)
EOSINOPHIL NFR BLD AUTO: 0 %
ERYTHROCYTE [DISTWIDTH] IN BLOOD BY AUTOMATED COUNT: 11.7 % (ref 10–15)
FLUAV AG SPEC QL IA: NEGATIVE
FLUAV RNA SPEC QL NAA+PROBE: NEGATIVE
FLUBV AG SPEC QL IA: NEGATIVE
FLUBV RNA RESP QL NAA+PROBE: NEGATIVE
GLUCOSE SERPL-MCNC: 116 MG/DL (ref 70–99)
GLUCOSE SERPL-MCNC: 120 MG/DL (ref 70–99)
GLUCOSE UR STRIP-MCNC: NEGATIVE MG/DL
HCO3 SERPL-SCNC: 19 MMOL/L (ref 22–29)
HCO3 SERPL-SCNC: 22 MMOL/L (ref 22–29)
HCT VFR BLD AUTO: 34.4 % (ref 35–47)
HGB BLD-MCNC: 11.9 G/DL (ref 11.7–15.7)
HGB UR QL STRIP: NEGATIVE
HOLD SPECIMEN: NORMAL
IMM GRANULOCYTES # BLD: 0.1 10E3/UL
IMM GRANULOCYTES NFR BLD: 1 %
KETONES UR STRIP-MCNC: 40 MG/DL
LEUKOCYTE ESTERASE UR QL STRIP: NEGATIVE
LIPASE SERPL-CCNC: 17 U/L (ref 13–60)
LYMPHOCYTES # BLD AUTO: 1.1 10E3/UL (ref 0.8–5.3)
LYMPHOCYTES NFR BLD AUTO: 9 %
MCH RBC QN AUTO: 29.8 PG (ref 26.5–33)
MCHC RBC AUTO-ENTMCNC: 34.6 G/DL (ref 31.5–36.5)
MCV RBC AUTO: 86 FL (ref 78–100)
MONOCYTES # BLD AUTO: 1.1 10E3/UL (ref 0–1.3)
MONOCYTES NFR BLD AUTO: 9 %
NEUTROPHILS # BLD AUTO: 10.5 10E3/UL (ref 1.6–8.3)
NEUTROPHILS NFR BLD AUTO: 82 %
NITRATE UR QL: NEGATIVE
NRBC # BLD AUTO: 0 10E3/UL
NRBC BLD AUTO-RTO: 0 /100
PH UR STRIP: 6 [PH] (ref 5–7)
PLATELET # BLD AUTO: 180 10E3/UL (ref 150–450)
POTASSIUM SERPL-SCNC: 3.5 MMOL/L (ref 3.4–5.3)
POTASSIUM SERPL-SCNC: 3.8 MMOL/L (ref 3.4–5.3)
PROT SERPL-MCNC: 6.7 G/DL (ref 6.4–8.3)
RBC # BLD AUTO: 4 10E6/UL (ref 3.8–5.2)
RBC URINE: 1 /HPF
RSV RNA SPEC NAA+PROBE: NEGATIVE
SARS-COV-2 RNA RESP QL NAA+PROBE: NEGATIVE
SODIUM SERPL-SCNC: 131 MMOL/L (ref 135–145)
SODIUM SERPL-SCNC: 134 MMOL/L (ref 135–145)
SP GR UR STRIP: 1.01 (ref 1–1.03)
SQUAMOUS EPITHELIAL: <1 /HPF
UROBILINOGEN UR STRIP-MCNC: NORMAL MG/DL
WBC # BLD AUTO: 12.8 10E3/UL (ref 4–11)
WBC URINE: 1 /HPF

## 2025-03-26 PROCEDURE — 83690 ASSAY OF LIPASE: CPT | Performed by: EMERGENCY MEDICINE

## 2025-03-26 PROCEDURE — 85025 COMPLETE CBC W/AUTO DIFF WBC: CPT | Performed by: EMERGENCY MEDICINE

## 2025-03-26 PROCEDURE — 3074F SYST BP LT 130 MM HG: CPT | Performed by: PHYSICIAN ASSISTANT

## 2025-03-26 PROCEDURE — 36415 COLL VENOUS BLD VENIPUNCTURE: CPT | Performed by: EMERGENCY MEDICINE

## 2025-03-26 PROCEDURE — 3078F DIAST BP <80 MM HG: CPT | Performed by: PHYSICIAN ASSISTANT

## 2025-03-26 PROCEDURE — 84155 ASSAY OF PROTEIN SERUM: CPT | Performed by: EMERGENCY MEDICINE

## 2025-03-26 PROCEDURE — 96360 HYDRATION IV INFUSION INIT: CPT

## 2025-03-26 PROCEDURE — 87804 INFLUENZA ASSAY W/OPTIC: CPT | Performed by: PHYSICIAN ASSISTANT

## 2025-03-26 PROCEDURE — 258N000003 HC RX IP 258 OP 636: Performed by: EMERGENCY MEDICINE

## 2025-03-26 PROCEDURE — 82374 ASSAY BLOOD CARBON DIOXIDE: CPT | Performed by: EMERGENCY MEDICINE

## 2025-03-26 PROCEDURE — 87637 SARSCOV2&INF A&B&RSV AMP PRB: CPT | Performed by: EMERGENCY MEDICINE

## 2025-03-26 PROCEDURE — 99283 EMERGENCY DEPT VISIT LOW MDM: CPT | Mod: 25

## 2025-03-26 PROCEDURE — 96361 HYDRATE IV INFUSION ADD-ON: CPT

## 2025-03-26 PROCEDURE — 99214 OFFICE O/P EST MOD 30 MIN: CPT | Performed by: PHYSICIAN ASSISTANT

## 2025-03-26 PROCEDURE — 82310 ASSAY OF CALCIUM: CPT | Performed by: EMERGENCY MEDICINE

## 2025-03-26 PROCEDURE — 81001 URINALYSIS AUTO W/SCOPE: CPT | Performed by: EMERGENCY MEDICINE

## 2025-03-26 RX ORDER — ACETAMINOPHEN 500 MG
1000 TABLET ORAL ONCE
Status: COMPLETED | OUTPATIENT
Start: 2025-03-26 | End: 2025-03-26

## 2025-03-26 RX ORDER — ONDANSETRON 4 MG/1
4 TABLET, ORALLY DISINTEGRATING ORAL EVERY 6 HOURS PRN
Qty: 10 TABLET | Refills: 0 | Status: SHIPPED | OUTPATIENT
Start: 2025-03-26 | End: 2025-03-29

## 2025-03-26 RX ADMIN — SODIUM CHLORIDE 1000 ML: 9 INJECTION, SOLUTION INTRAVENOUS at 13:08

## 2025-03-26 RX ADMIN — Medication 1000 MG: at 10:26

## 2025-03-26 ASSESSMENT — ACTIVITIES OF DAILY LIVING (ADL)
ADLS_ACUITY_SCORE: 43

## 2025-03-26 ASSESSMENT — COLUMBIA-SUICIDE SEVERITY RATING SCALE - C-SSRS
1. IN THE PAST MONTH, HAVE YOU WISHED YOU WERE DEAD OR WISHED YOU COULD GO TO SLEEP AND NOT WAKE UP?: NO
2. HAVE YOU ACTUALLY HAD ANY THOUGHTS OF KILLING YOURSELF IN THE PAST MONTH?: NO
6. HAVE YOU EVER DONE ANYTHING, STARTED TO DO ANYTHING, OR PREPARED TO DO ANYTHING TO END YOUR LIFE?: NO

## 2025-03-26 NOTE — ED TRIAGE NOTES
Pt presents for evaluation of abdominal pain, back pain, body aches, decreased appetite, dizziness and fever. Symptoms since Monday. Hasn't taken meds for 2 days due to symptoms. Went to [facility], had a flu swab done, tylenol and was sent for further evaluation.

## 2025-03-26 NOTE — PROGRESS NOTES
Assessment & Plan     1. Fever in adult (Primary)  - Influenza A & B Antigen  - acetaminophen (TYLENOL) tablet 1,000 mg    2. Dizziness    Patient presents to clinic for 2-day history of lightheadedness, weakness and generally feeling unwell.  She appears ill in clinic, and has a fever with temperature at 103.  She does have right upper quadrant abdominal pain to palpation, without rebound, guarding or rigidity.  Perhaps a viral syndrome, given her cough, but she has difficulty sitting for examination and I do think that she needs further work up. Blood pressure and pulse are stable. Limitations for IV fluids, labs etc were discussed with the patient and I advised evaluation in the emergency department. Patients mom will drive her to [facility] now.     MYAH Yeboah [facility]    CHIEF COMPLAINT:   Chief Complaint   Patient presents with    Urgent Care     COUGH, DIZZINESS, NAUSEA, VOMITING, DIARRHEA- ALL STARTED MONDAY- dehydration concerns     Subjective     Mariya is a 49 year old female who presents to clinic today for evaluation of dizziness.  Symptoms started on Monday.  At that time she felt very fatigued along with allover body aches.  Currently having low back pain.  She endorses nausea, but has not had any vomiting.  She has had some diarrhea.  She developed a cough yesterday, cough is mild without runny nose or congestion.  She believes that her fever started on Monday as well.  She feels that she is going to faint.  Describes her dizziness as being lightheaded.  Denies having chest pain or shortness of breath.      No past medical history on file.  No past surgical history on file.  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 12 tablet 3    blood glucose (CONTOUR NEXT TEST) test strip USE TO TEST THREE TIMES A  strip 0    blood glucose monitoring (SOFTCLIX) lancets Use to test blood sugar 3 times daily. 100 each 4    Blood Glucose Monitoring Suppl (CONTOUR NEXT ONE) CRICKET USE TO TEST BLOOD SUGAR TWICE A DAY OR AS DIRECTED 1 each 0    buPROPion (WELLBUTRIN XL) 150 MG 24 hr tablet Take 150 mg by mouth daily      buPROPion (WELLBUTRIN XL) 300 MG 24 hr tablet Take 300 mg by mouth every morning      Emollient (CERAVE MOISTURIZING) CREA Externally apply 1 Application topically 2 times daily To entire body 453 g 11    hydrOXYzine (ATARAX) 25 MG tablet       metFORMIN (GLUCOPHAGE XR) 500 MG 24 hr tablet TAKE 2 TABLETS BY MOUTH TWICE A DAY WITH MEALS 369 tablet 3    polyethylene glycol (MIRALAX) 17 GM/Dose powder DISSOLVE 17 GRAMS IN 8OZ OF LIQUID AND DRINK DAILY 510 g 3    sertraline (ZOLOFT) 50 MG tablet Take 50 mg by mouth daily      topiramate (TOPAMAX) 100 MG tablet Take 150 mg by mouth At Bedtime      traZODone (DESYREL) 100 MG tablet       vitamin B-Complex Take 1 tablet by mouth daily       No current facility-administered medications for this visit.     Allergies   Allergen Reactions    Statins      Patient intolerant along with fatigue ongoing.  LDL remaining at goal, not concerning at this time.       10 point ROS of systems were all negative except for pertinent positives noted in my HPI.      Exam:   BP 98/62 (BP Location: Right arm, Patient Position: Sitting, Cuff Size: Adult Regular)   Pulse 84   Temp (!) 103  F (39.4  C) (Tympanic)   Resp 20   Wt 59.9 kg (132 lb)   LMP  (LMP Unknown)   SpO2 97%   BMI 21.97 kg/m    Constitutional: patient has difficulty  ambulating due to dizziness. She appears ill.   Head: Normocephalic, atraumatic.  Eyes: conjunctiva clear, no drainage  ENT: Mucous membrane slightly dry. Throat clear.  Neck: neck is supple, no cervical lymphadenopathy or nuchal rigidity  Cardiovascular: RRR  Respiratory: CTA bilaterally, no rhonchi or rales  Gastrointestinal: soft. Discomfort in the right upper quadrant. No rebound, guarding or rigidity.   Skin: no rashes  Neurologic: Moves all extremities.    Results for orders placed or performed in visit on 03/26/25   Influenza A & B Antigen     Status: Normal    Specimen: Nose; Swab   Result Value Ref Range    Influenza A antigen Negative Negative    Influenza B antigen Negative Negative    Narrative    Test results must be correlated with clinical data. If necessary, results should be confirmed by a molecular assay or viral culture.

## 2025-03-26 NOTE — ED PROVIDER NOTES
"    History     Chief Complaint:  Viral Syndrome       HPI   Mariya Núñez is a 49 year old female 2 days of feeling weaker fever lightheadedness.  No localized pain but mentions some general abdominal discomfort.  Urine darker no blood or odor.  No NVD.  BM brown no blood.  No travel.  No known sick contacts.  No CP or SOB.  Slight cough none at present.  Slight congestion.        Independent Historian:    Mom    Review of External Notes:   note today.    Medications:    alendronate (FOSAMAX) 70 MG tablet  blood glucose (CONTOUR NEXT TEST) test strip  blood glucose monitoring (SOFTCLIX) lancets  Blood Glucose Monitoring Suppl (CONTOUR NEXT ONE) CRICKET  buPROPion (WELLBUTRIN XL) 150 MG 24 hr tablet  buPROPion (WELLBUTRIN XL) 300 MG 24 hr tablet  Emollient (CERAVE MOISTURIZING) CREA  hydrOXYzine (ATARAX) 25 MG tablet  metFORMIN (GLUCOPHAGE XR) 500 MG 24 hr tablet  ondansetron (ZOFRAN ODT) 4 MG ODT tab  polyethylene glycol (MIRALAX) 17 GM/Dose powder  sertraline (ZOLOFT) 50 MG tablet  topiramate (TOPAMAX) 100 MG tablet  traZODone (DESYREL) 100 MG tablet  vitamin B-Complex        Past Medical History:    No past medical history on file.    Past Surgical History:    No past surgical history on file.       Physical Exam   Patient Vitals for the past 24 hrs:   BP Temp Temp src Pulse Resp SpO2 Height Weight   03/26/25 1113 90/65 97.5  F (36.4  C) Temporal 109 18 98 % 1.676 m (5' 6\") 59.9 kg (132 lb)        Physical Exam  General: Patient is viral syndrome appearing but not in extremis or distress.    Head: Atraumatic.  Eyes: Conjunctivae and EOM are normal. No scleral icterus.  Neck: Normal range of motion. Neck supple.   Cardiovascular: Normal rate, regular rhythm, normal heart sounds and intact distal pulses.   Pulmonary/Chest: Breath sounds normal. No respiratory distress.  No wheeze rales rhonchi.   Abdominal: Soft. Bowel sounds are normal. No distension. No tenderness. No rebound or guarding.   Musculoskeletal: " Normal range of motion.  Skin: Warm and dry. No rash noted. Not diaphoretic.      Emergency Department Course   ECG      Imaging:  No orders to display       Laboratory:  Labs Ordered and Resulted from Time of ED Arrival to Time of ED Departure   COMPREHENSIVE METABOLIC PANEL - Abnormal       Result Value    Sodium 131 (*)     Potassium 3.5      Carbon Dioxide (CO2) 19 (*)     Anion Gap 16 (*)     Urea Nitrogen 15.8      Creatinine 0.73      GFR Estimate >90      Calcium 8.5 (*)     Chloride 96 (*)     Glucose 120 (*)     Alkaline Phosphatase 52      AST 14      ALT <5      Protein Total 6.7      Albumin 3.7      Bilirubin Total 0.4     ROUTINE UA WITH MICROSCOPIC REFLEX TO CULTURE - Abnormal    Color Urine Light Yellow      Appearance Urine Clear      Glucose Urine Negative      Bilirubin Urine Negative      Ketones Urine 40 (*)     Specific Gravity Urine 1.009      Blood Urine Negative      pH Urine 6.0      Protein Albumin Urine Negative      Urobilinogen Urine Normal      Nitrite Urine Negative      Leukocyte Esterase Urine Negative      RBC Urine 1      WBC Urine 1      Squamous Epithelials Urine <1     CBC WITH PLATELETS AND DIFFERENTIAL - Abnormal    WBC Count 12.8 (*)     RBC Count 4.00      Hemoglobin 11.9      Hematocrit 34.4 (*)     MCV 86      MCH 29.8      MCHC 34.6      RDW 11.7      Platelet Count 180      % Neutrophils 82      % Lymphocytes 9      % Monocytes 9      % Eosinophils 0      % Basophils 0      % Immature Granulocytes 1      NRBCs per 100 WBC 0      Absolute Neutrophils 10.5 (*)     Absolute Lymphocytes 1.1      Absolute Monocytes 1.1      Absolute Eosinophils 0.0      Absolute Basophils 0.0      Absolute Immature Granulocytes 0.1      Absolute NRBCs 0.0     INFLUENZA A/B, RSV AND SARS-COV2 PCR - Normal    Influenza A PCR Negative      Influenza B PCR Negative      RSV PCR Negative      SARS CoV2 PCR Negative     LIPASE - Normal    Lipase 17     BASIC METABOLIC PANEL        Procedures        Emergency Department Course & Assessments:    Interventions:  Medications   sodium chloride 0.9% BOLUS 1,000 mL (1,000 mLs Intravenous $New Bag 3/26/25 4030)        Assessments:      Independent Interpretation (X-rays, CTs, rhythm strip):      Consultations/Discussion of Management or Tests:         Social Drivers of Health affecting care:       Disposition:  The patient was discharged.    Impression & Plan           Medical Decision Making:  Mariya Núñez is a 49 year old female who presents for evaluation of  fever and myalgias.  This is consistent with an influenza like illness.  Appears dehydrated by labs but much better near reversal after IVF.  UA viral swabs neg and no other focal areas.  They are at risk for pneumonia but no signs of this are detected on today's visit.  Close followup of primary care physician is indicated and return to the ED for high fevers > 103 for more than 48 hours more, increasing productive cough, shortness of breath, or confusion.  There is no signs of serious bacterial infection such as bacteremia, meningitis, UTI/pyelonephritis, strep pharyngitis, etc.       Diagnosis:    ICD-10-CM    1. Dehydration  E86.0       2. Viral syndrome  B34.9            Discharge Medications:  New Prescriptions    ONDANSETRON (ZOFRAN ODT) 4 MG ODT TAB    Take 1 tablet (4 mg) by mouth every 6 hours as needed for nausea or vomiting.            3/26/2025   Bill Bingham MD Stevens, Andrew C, MD  03/26/25 8090

## 2025-03-26 NOTE — Clinical Note
Mariya Núñez was seen and treated in our emergency department on 3/26/2025.  She may return to work on 03/28/2025.       If you have any questions or concerns, please don't hesitate to call.      Bill Bingham MD

## 2025-03-29 ENCOUNTER — HEALTH MAINTENANCE LETTER (OUTPATIENT)
Age: 50
End: 2025-03-29

## 2025-05-14 ENCOUNTER — PATIENT OUTREACH (OUTPATIENT)
Dept: CARE COORDINATION | Facility: CLINIC | Age: 50
End: 2025-05-14
Payer: COMMERCIAL

## 2025-07-31 ENCOUNTER — PATIENT OUTREACH (OUTPATIENT)
Dept: CARE COORDINATION | Facility: CLINIC | Age: 50
End: 2025-07-31
Payer: COMMERCIAL

## 2025-08-04 DIAGNOSIS — K59.00 CONSTIPATION, UNSPECIFIED CONSTIPATION TYPE: ICD-10-CM

## 2025-08-04 RX ORDER — POLYETHYLENE GLYCOL 3350 17 G/17G
POWDER, FOR SOLUTION ORAL
Qty: 510 G | Refills: 0 | Status: SHIPPED | OUTPATIENT
Start: 2025-08-04